# Patient Record
Sex: FEMALE | Race: WHITE | NOT HISPANIC OR LATINO | Employment: OTHER | URBAN - METROPOLITAN AREA
[De-identification: names, ages, dates, MRNs, and addresses within clinical notes are randomized per-mention and may not be internally consistent; named-entity substitution may affect disease eponyms.]

---

## 2017-01-04 ENCOUNTER — TELEPHONE (OUTPATIENT)
Dept: OBSTETRICS AND GYNECOLOGY | Facility: CLINIC | Age: 72
End: 2017-01-04

## 2017-01-04 DIAGNOSIS — B37.31 CANDIDIASIS OF VULVA: Primary | ICD-10-CM

## 2017-01-04 RX ORDER — FLUCONAZOLE 150 MG/1
150 TABLET ORAL AS NEEDED
Qty: 5 TABLET | Refills: 0 | Status: SHIPPED | OUTPATIENT
Start: 2017-01-04 | End: 2017-02-16 | Stop reason: SDUPTHER

## 2017-01-04 NOTE — TELEPHONE ENCOUNTER
Multiple phone calls back and forth to & from pt. She states she continues to be extremely uncomfortable w/ vulvar irritation/burning, and occasional itching. She was given results of vulvar bx. We have sent in RX for fluconazole 150 mg PO QOD x 5 doses and pt voices understanding instructions of use.  She states she is very frustrated about the discomfort associated with her pelvic prolapse. We discussed her multiple health issues and Dr Wagoner's concern possible increased surgical risk. She states she has been in fairly good control with blood sugars & has appt with diabetes doctor in 2 weeks. She will see her nephrologist in March.  She will call us back after completing the 10 day treatment (5 doses) of fluconazole.

## 2017-02-16 DIAGNOSIS — B37.31 CANDIDIASIS OF VULVA: ICD-10-CM

## 2017-02-16 RX ORDER — FLUCONAZOLE 150 MG/1
150 TABLET ORAL DAILY
Qty: 5 TABLET | Refills: 0 | Status: SHIPPED | OUTPATIENT
Start: 2017-02-16 | End: 2017-02-26

## 2017-02-16 RX ORDER — FLUCONAZOLE 150 MG/1
150 TABLET ORAL AS NEEDED
Qty: 5 TABLET | Refills: 0 | Status: SHIPPED | OUTPATIENT
Start: 2017-02-16 | End: 2018-05-17

## 2017-02-16 NOTE — TELEPHONE ENCOUNTER
"Call from pt requesting fluconazole refill.  States it \"worked great\" when she used it in early January, even helped with the rash she was experiencing under breasts.  Explained to her that problems with yeast will continue with blood sugars running high.  She says she is working with her PCP and he has changed her diabetes meds.  States her \"last A1C was 7\" Sending in fluconazole 150 mg, one tab every other day x 10 days.  Have requested she call me next week so we can discuss other long-term options with her.    "

## 2017-03-06 ENCOUNTER — HOSPITAL ENCOUNTER (OUTPATIENT)
Dept: OTHER | Age: 72
Discharge: HOME | End: 2017-03-06
Attending: INTERNAL MEDICINE | Admitting: INTERNAL MEDICINE

## 2017-03-06 LAB
CHOLESTEROL, TOTAL: 190 MG/DL (ref 0–200)
HBA1C MFR BLD: 7 %
HDLC SERPL-MCNC: 55 MG/DL (ref 40–60)
LDL CHOLESTEROL CALCULATED: 104 MG/DL
PARATHYROID HORMONE INTACT: 60.7 PG/ML (ref 14–72)
TRIGL SERPL-MCNC: 157 MG/DL (ref 0–249)
TSH SERPL DL<=0.05 MIU/L-ACNC: 2.26 UIU/ML (ref 0.35–5.5)
URIC ACID, SERUM: 7.4 MG/DL (ref 2.5–7.1)
VITAMIN D 25-HYDROXY: 31.3 (ref 32–100)
VLDLC SERPL CALC-MCNC: 31 MG/DL

## 2017-07-08 ENCOUNTER — HOSPITAL ENCOUNTER (OUTPATIENT)
Dept: OTHER | Age: 72
Discharge: HOME | End: 2017-07-08

## 2017-07-08 LAB
HBA1C MFR BLD: 6.7 %
PARATHYROID HORMONE INTACT: 88.2 PG/ML (ref 14–72)
TSH SERPL DL<=0.05 MIU/L-ACNC: 3.49 UIU/ML (ref 0.35–5.5)
URIC ACID, SERUM: 7.6 MG/DL (ref 2.5–7.1)

## 2017-07-10 LAB — VITAMIN D 25-HYDROXY: 40.9 (ref 32–100)

## 2017-09-07 ENCOUNTER — APPOINTMENT (OUTPATIENT)
Dept: BONE DENSITY | Facility: HOSPITAL | Age: 72
End: 2017-09-07

## 2017-09-07 ENCOUNTER — TRANSCRIBE ORDERS (OUTPATIENT)
Dept: ADMINISTRATIVE | Facility: HOSPITAL | Age: 72
End: 2017-09-07

## 2017-09-07 ENCOUNTER — HOSPITAL ENCOUNTER (OUTPATIENT)
Dept: GENERAL RADIOLOGY | Facility: HOSPITAL | Age: 72
Discharge: HOME | End: 2017-09-07
Admitting: INTERNAL MEDICINE

## 2017-09-07 DIAGNOSIS — M79.641 BILATERAL HAND PAIN: ICD-10-CM

## 2017-09-07 DIAGNOSIS — M79.641 BILATERAL HAND PAIN: Primary | ICD-10-CM

## 2017-09-07 DIAGNOSIS — M79.642 BILATERAL HAND PAIN: Primary | ICD-10-CM

## 2017-09-07 DIAGNOSIS — M79.642 BILATERAL HAND PAIN: ICD-10-CM

## 2017-09-07 DIAGNOSIS — N95.9 POST MENOPAUSAL PROBLEMS: ICD-10-CM

## 2017-09-07 PROCEDURE — 73120 X-RAY EXAM OF HAND: CPT

## 2017-09-07 PROCEDURE — 77080 DXA BONE DENSITY AXIAL: CPT

## 2017-11-29 ENCOUNTER — HOSPITAL ENCOUNTER (OUTPATIENT)
Dept: OTHER | Age: 72
Discharge: HOME | End: 2017-11-29

## 2017-11-29 LAB
CHOLESTEROL, TOTAL: 166 MG/DL (ref 0–200)
HBA1C MFR BLD: 6.6 %
HDLC SERPL-MCNC: 53 MG/DL (ref 40–60)
LDL CHOLESTEROL CALCULATED: 90 MG/DL
PARATHYROID HORMONE INTACT: 71.4 PG/ML (ref 14–72)
TRIGL SERPL-MCNC: 115 MG/DL (ref 0–249)
TSH SERPL DL<=0.05 MIU/L-ACNC: 4.71 UIU/ML (ref 0.35–5.5)
URIC ACID, SERUM: 8 MG/DL (ref 2.5–7.1)
VITAMIN D 25-HYDROXY: 45 (ref 32–100)
VLDLC SERPL CALC-MCNC: 23 MG/DL

## 2018-03-15 ENCOUNTER — HOSPITAL ENCOUNTER (OUTPATIENT)
Dept: OTHER | Age: 73
Discharge: HOME | End: 2018-03-15

## 2018-03-15 LAB
ALBUMIN SERPL-MCNC: 4.6 G/DL (ref 3.4–4.8)
ANION GAP SERPL CALCULATED.3IONS-SCNC: 13 MMOL/L (ref 3–16)
BILIRUBIN URINE: NEGATIVE
BLOOD, URINE: NEGATIVE
BUN BLDV-MCNC: 36 MG/DL (ref 6–20)
CALCIUM SERPL-MCNC: 9.3 MG/DL (ref 8.5–10.5)
CHLORIDE BLD-SCNC: 105 MMOL/L (ref 98–107)
CHOLESTEROL, TOTAL: 200 MG/DL (ref 0–200)
CLARITY: CLEAR
CO2: 23 MMOL/L (ref 20–30)
COLOR: YELLOW
CREAT SERPL-MCNC: 1.7 MG/DL (ref 0.4–1.2)
GFR AFRICAN AMERICAN: 36
GFR NON-AFRICAN AMERICAN: 29
GLUCOSE BLD-MCNC: 142 MG/DL (ref 74–106)
GLUCOSE URINE: NEGATIVE MG/DL
HBA1C MFR BLD: 7.4 %
HDLC SERPL-MCNC: 59 MG/DL (ref 40–60)
KETONES, URINE: NEGATIVE MG/DL
LDL CHOLESTEROL CALCULATED: 117 MG/DL
LEUKOCYTE ESTERASE, URINE: NEGATIVE
MICROSCOPIC EXAMINATION: NORMAL
NITRITE, URINE: NEGATIVE
PARATHYROID HORMONE INTACT: 63.2 PG/ML (ref 14–72)
PH UA: 5
PHOSPHORUS: 4 MG/DL (ref 2.5–4.5)
POTASSIUM SERPL-SCNC: 5 MMOL/L (ref 3.4–5.1)
PROTEIN UA: NEGATIVE MG/DL
SODIUM BLD-SCNC: 141 MMOL/L (ref 136–145)
SPECIFIC GRAVITY UA: <=1.005
TRIGL SERPL-MCNC: 122 MG/DL (ref 0–249)
TSH SERPL DL<=0.05 MIU/L-ACNC: 3.07 UIU/ML (ref 0.35–5.5)
URIC ACID, SERUM: 8.8 MG/DL (ref 2.5–7.1)
URINE TYPE: NORMAL
UROBILINOGEN, URINE: 0.2 E.U./DL
VITAMIN D 25-HYDROXY: 38.1 (ref 32–100)
VLDLC SERPL CALC-MCNC: 24 MG/DL

## 2018-04-12 ENCOUNTER — TRANSCRIBE ORDERS (OUTPATIENT)
Dept: ADMINISTRATIVE | Facility: HOSPITAL | Age: 73
End: 2018-04-12

## 2018-04-12 ENCOUNTER — HOSPITAL ENCOUNTER (OUTPATIENT)
Dept: GENERAL RADIOLOGY | Facility: HOSPITAL | Age: 73
Discharge: HOME | End: 2018-04-12
Admitting: FAMILY MEDICINE

## 2018-04-12 DIAGNOSIS — M25.541 ARTHRALGIA OF RIGHT HAND: Primary | ICD-10-CM

## 2018-04-12 PROCEDURE — 73130 X-RAY EXAM OF HAND: CPT

## 2018-05-17 ENCOUNTER — OFFICE VISIT (OUTPATIENT)
Dept: OBSTETRICS AND GYNECOLOGY | Facility: CLINIC | Age: 73
End: 2018-05-17

## 2018-05-17 VITALS
SYSTOLIC BLOOD PRESSURE: 130 MMHG | WEIGHT: 158.6 LBS | HEIGHT: 64 IN | BODY MASS INDEX: 27.08 KG/M2 | DIASTOLIC BLOOD PRESSURE: 64 MMHG

## 2018-05-17 DIAGNOSIS — N81.11 CYSTOCELE, MIDLINE: ICD-10-CM

## 2018-05-17 DIAGNOSIS — N81.4 UTERUS PROLAPSE: ICD-10-CM

## 2018-05-17 DIAGNOSIS — Z78.0 MENOPAUSE: Primary | ICD-10-CM

## 2018-05-17 PROCEDURE — 99213 OFFICE O/P EST LOW 20 MIN: CPT | Performed by: OBSTETRICS & GYNECOLOGY

## 2018-05-17 PROCEDURE — 57160 INSERT PESSARY/OTHER DEVICE: CPT | Performed by: OBSTETRICS & GYNECOLOGY

## 2018-05-17 PROCEDURE — A4562 PESSARY, NON RUBBER,ANY TYPE: HCPCS | Performed by: OBSTETRICS & GYNECOLOGY

## 2018-05-17 RX ORDER — ALLOPURINOL 100 MG/1
1 TABLET ORAL DAILY
COMMUNITY

## 2018-05-17 RX ORDER — GABAPENTIN 300 MG/1
300 CAPSULE ORAL 3 TIMES DAILY
COMMUNITY
End: 2020-10-12 | Stop reason: SDUPTHER

## 2018-05-17 RX ORDER — DILTIAZEM HYDROCHLORIDE 300 MG/1
1 CAPSULE, COATED, EXTENDED RELEASE ORAL DAILY
COMMUNITY
End: 2020-10-12 | Stop reason: SDUPTHER

## 2018-05-17 RX ORDER — PREDNISONE 10 MG/1
1 TABLET ORAL DAILY
Status: ON HOLD | COMMUNITY
Start: 2016-02-29 | End: 2018-07-02

## 2018-05-17 RX ORDER — HYDROXYCHLOROQUINE SULFATE 200 MG/1
200 TABLET, FILM COATED ORAL DAILY
COMMUNITY
End: 2020-10-12 | Stop reason: SDUPTHER

## 2018-05-17 NOTE — PROGRESS NOTES
"   Chief Complaint   Patient presents with   • Gynecologic Exam     c/o pelvic organ prolapse       Janette Diaz is a 72 y.o. year old  presenting to be seen because of complaints of uterine prolapse outside the vagina.  She felt a sudden bulge and \"surge\" 2 months ago and has had protrusion since that time.  She is having spotting from the cervix.  She is able to control her bowel movements and urination.  She has had no prior gynecologic surgery except for a D&C.    History   Sexual Activity   • Sexual activity: Not Currently   • Partners: Male     SCREENING TESTS    Year 2012   Age                         PAP                         HPV high risk                         Mammogram     benign                    JAMIE score                         Breast MRI                         Lipids                         Vitamin D                         Colonoscopy                         DEXA  Frax (hip/any)      osteopenia                   Ovarian Screen                             No Additional Complaints Reported    The following portions of the patient's history were reviewed and updated as appropriate:vital signs and   She  does not have any pertinent problems on file.  She  has a past surgical history that includes Cholecystectomy; Replacement total knee bilateral; Cataract extraction w/  intraocular lens implant (Bilateral); and Tubal ligation.  Her family history includes Breast cancer (age of onset: 43) in her daughter; Breast cancer (age of onset: 72) in her maternal grandmother; Diabetes in her maternal grandfather and mother; Heart disease in her father, maternal grandfather, and mother; Hypertension in her maternal grandfather; Osteoporosis in her maternal grandmother; Thyroid disease in her maternal grandmother.  She  reports that she has never smoked. She has never used smokeless tobacco. She " "reports that she drinks alcohol. She reports that she does not use drugs.  Current Outpatient Prescriptions   Medication Sig Dispense Refill   • hydroxychloroquine (PLAQUENIL) 200 MG tablet Take 200 mg by mouth Daily.     • predniSONE (DELTASONE) 10 MG tablet Take 1 tablet by mouth Daily.     • allopurinol (ZYLOPRIM) 100 MG tablet Take 1 tablet by mouth Daily.     • amitriptyline (ELAVIL) 50 MG tablet Take 50 mg by mouth nightly.     • aspirin 81 MG chewable tablet Take 81 mg by mouth daily     • diltiaZEM CD (CARTIA XT) 300 MG 24 hr capsule Take 1 capsule by mouth Daily.     • doxazosin (CARDURA) 8 MG tablet Take 1 tablet by mouth Daily.     • furosemide (LASIX) 20 MG tablet Take 20 mg by mouth daily     • gabapentin (NEURONTIN) 300 MG capsule Take 1 capsule by mouth 3 (Three) Times a Day.     • LANTUS SOLOSTAR 100 UNIT/ML injection pen INJECT 30 UNITS EVERY MORNING  0   • levothyroxine (SYNTHROID, LEVOTHROID) 75 MCG tablet Take 1 tablet by mouth Daily.     • nateglinide (STARLIX) 120 MG tablet Take 1 tablet by mouth 3 (three) times a day.     • omeprazole (PriLOSEC) 20 MG capsule Take 20 mg by mouth daily.     • pravastatin (PRAVACHOL) 20 MG tablet Take 20 mg by mouth daily.     • vitamin D (ERGOCALCIFEROL) 80253 UNITS capsule capsule Take 500,000 Units by mouth once a week Indications: take on Saturday       No current facility-administered medications for this visit.      She is allergic to codeine..        Review of Systems  A comprehensive review of systems was done.  Constitutional: negative for fever, chills, activity change, appetite change, fatigue and unexpected weight change.  Respiratory: negative  Cardiovascular: negative  Gastrointestinal: positive for constipation  Genitourinary:positive for bulging from the vagina  Musculoskeletal: negative  Behavioral/Psych: negative          /64   Ht 162.6 cm (64\")   Wt 71.9 kg (158 lb 9.6 oz)   LMP  (LMP Unknown) Comment: POST-MENOPAUSAL  BMI 27.22 " kg/m²     Physical Exam    General:  alert; cooperative; well developed; well nourished   Skin:  No suspicious lesions seen   Thyroid: normal to inspection and palpation   Lungs:  clear to auscultation bilaterally   Heart:  regular rate and rhythm, S1, S2 normal, no murmur, click, rub or gallop   Breasts:  No masses   Abdomen: soft, non-tender; no masses  no umbilical or inginual hernias are present  no hepato-splenomegaly   Pelvis: Clinical staff was present for exam  Cervix:  friable;  Uterus:  normal size, shape and consistency. prolapses  Adnexa:  non palpable bilaterally.  Cystocele GRADE 2  Uterine GRADE 4     Lab Review   No data reviewed    Imaging   Mammogram results    Medical counseling was given to patient for the following topics: diagnostic results, instructions for management, risk factor reductions, prognosis, impressions and risks and benefits of treatment options . Total time of the encounter was 23 minute(s) and 15 minute(s)  was spent in face to face counseling.  I have counseled the patient that she is going to need a sacral colpopexy to correct this major defect.  I have counseled the patient that I'm no longer doing this procedure but Dr. Best is.  I have counseled the patient that we need to attempt to elevate her uterine prolapse temporarily with a pessary in order to avoid its external position.  She is agreeable to this.  I have counseled her about the side effects of difficulty with voiding and defecation.  I have counseled her to use a stool softener daily.          ASSESSMENT  Problems Addressed this Visit        Genitourinary    Menopause - Primary    Uterus prolapse    Cystocele, midline            PLAN    · Medications prescribed this encounter:  No orders of the defined types were placed in this encounter.  · The patient was fit with a #5 cube pessary without difficulty.  She was able to tolerate the pessary without expulsion, Initially however with continued movement the pessary  was expelled.  · I have discussed follow up with the patient recommended that she see Dr. Best about a sacral colpopexy.  I have counseled her to avoid any lifting or straining in the meantime until he can see her.  · Follow up: 1 week(s) with Dr. Mihai Best  · Calcium, 600 mg/ Vit. D, 400 IU daily     *Please note that portions of this documentation may have been completed with a voice recognition program.  Efforts were made to edit this dictation, but occasional words may have been mistranscribed.     This note was electronically signed.    ROSEY Wagoner MD  May 17, 2018  3:30 PM

## 2018-05-21 ENCOUNTER — OFFICE VISIT (OUTPATIENT)
Dept: OBSTETRICS AND GYNECOLOGY | Facility: CLINIC | Age: 73
End: 2018-05-21

## 2018-05-21 VITALS
WEIGHT: 156 LBS | SYSTOLIC BLOOD PRESSURE: 118 MMHG | HEIGHT: 64 IN | DIASTOLIC BLOOD PRESSURE: 70 MMHG | BODY MASS INDEX: 26.63 KG/M2

## 2018-05-21 DIAGNOSIS — N81.6 RECTOCELE: ICD-10-CM

## 2018-05-21 DIAGNOSIS — N81.11 CYSTOCELE, MIDLINE: ICD-10-CM

## 2018-05-21 DIAGNOSIS — N81.4 UTERUS PROLAPSE: Primary | ICD-10-CM

## 2018-05-21 PROCEDURE — 99214 OFFICE O/P EST MOD 30 MIN: CPT | Performed by: OBSTETRICS & GYNECOLOGY

## 2018-05-22 ENCOUNTER — PREP FOR SURGERY (OUTPATIENT)
Dept: OTHER | Facility: HOSPITAL | Age: 73
End: 2018-05-22

## 2018-05-22 DIAGNOSIS — N81.4 UTERINE PROLAPSE: Primary | ICD-10-CM

## 2018-05-22 PROBLEM — Z80.3 FAMILY HISTORY OF BREAST CANCER: Status: ACTIVE | Noted: 2018-05-22

## 2018-05-22 PROBLEM — M85.80 OSTEOPENIA: Status: ACTIVE | Noted: 2018-05-22

## 2018-05-22 PROBLEM — Z96.653 HISTORY OF BILATERAL KNEE REPLACEMENT: Status: ACTIVE | Noted: 2018-05-22

## 2018-05-22 PROBLEM — H26.9 CATARACT: Status: ACTIVE | Noted: 2018-05-22

## 2018-05-22 PROBLEM — Z90.49 S/P CHOLECYSTECTOMY: Status: ACTIVE | Noted: 2018-05-22

## 2018-05-22 RX ORDER — CEFAZOLIN SODIUM 2 G/100ML
2 INJECTION, SOLUTION INTRAVENOUS
Status: CANCELLED | OUTPATIENT
Start: 2018-05-22 | End: 2018-05-24

## 2018-05-22 RX ORDER — SODIUM CHLORIDE 0.9 % (FLUSH) 0.9 %
1-10 SYRINGE (ML) INJECTION AS NEEDED
Status: CANCELLED | OUTPATIENT
Start: 2018-05-22

## 2018-05-22 RX ORDER — SODIUM CHLORIDE, SODIUM LACTATE, POTASSIUM CHLORIDE, CALCIUM CHLORIDE 600; 310; 30; 20 MG/100ML; MG/100ML; MG/100ML; MG/100ML
125 INJECTION, SOLUTION INTRAVENOUS CONTINUOUS
Status: CANCELLED | OUTPATIENT
Start: 2018-05-22

## 2018-05-22 NOTE — PROGRESS NOTES
Subjective   Chief Complaint   Patient presents with   • Consult     surg for complete prolapse     Janette Diaz is a 72 y.o. year old .  No LMP recorded (lmp unknown). Patient is postmenopausal.  She presents to be seen upon referral from Dr. EMILY Wagoner.  She has complete uterine prolapse by history and he feels she needs a sacral colpopexy. He attempted to place a pessary but this did not stay in the vagina.  She has been having extreme pain.  She reports that the uterus wasdropped down last year but then 2 weeks ago it completely fell out to the point where she has difficulty walking or sitting she has some bleeding and pain almost daily.  She is still sexually active or had been until the uterine prolapse.  There was some issue with dryness spite using over-the-counter lubricants.  I discussed various options and treatments.  Although she would be willing to undergo something where she would be sexually activeif that were the only option I think a sacral colpopexy but obviously preserved the vaginal depth to allow for sexual activity.  Options discussed included vaginal surgery however I don't think that would be a good long-term cure.  I discussed various options regarding sacral colpopexy or sacral cervico pexy which would preserve the cervix there be less risk for mesh erosion if the mesh was placed and cervix.  Also preserve a little more length to the vagina.  Discussed removal of at least her tubes if not tubes and ovaries at her age.  Suggested that she probably would need some estrogen cream at the very minimal.  She has not been on estrogen replacement therapy.  Her largest vaginal delivery was 9 pounds and 6 ounces in . Her next delivery was in .  Her daughter weighed 7 pounds.    3 years ago she has developed breast cancer.   BRCA1 and 2 negative.    Past 6 month menstrual and contraceptive history:    No LMP recorded (lmp unknown). Patient is postmenopausal.                        "       The following portions of the patient's history were reviewed and updated as appropriate.    Review of Systems      Objective   /70   Ht 162.6 cm (64\")   Wt 70.8 kg (156 lb)   LMP  (LMP Unknown) Comment: POST-MENOPAUSAL  BMI 26.78 kg/m²     General:  well developed; well nourished  no acute distress  appears stated age   Skin:  No suspicious lesions seen   Thyroid: not examined   Lungs:  breathing is unlabored   Heart:  Not performed.   Abdomen: soft, non-tender; no masses  no umbilical or inginual hernias are present  no hepato-splenomegaly   Pelvis: Clinical staff was present for exam  External genitalia:  normal appearance of the external genitalia including Bartholin's and [place]'s glands. Total uterine prolapse noted  :  urethral meatus normal;  Vaginal:  atrophic mucosal changes are present;  Cervix:  friable;  Uterus:  normal size, shape and consistency. Total uterine prolapse but does not feel enlarged cervix maybe along  Adnexa:  normal size, shape and consistency. Total prolapse but this does not feel enlarged other than maybe linked and cervix  Rectal:  anus visually normal appearing. recto-vaginal exam unremarkable and confirms findings; no masses; guaiac negative; Thin rectovaginal septum  Cystocele GRADE 3  Uterine GRADE 4     Lab Review   No data reviewed    Imaging   No data reviewed       Assessment   1.   Total uterine prolapse which is asymptomatic currently.  I discussed various methods of repair and I think a laparoscopic sacral cervical pexy with associated subtotal hysterectomy with the good option.  Could consider even a uteropexy.  Removal of tubes and/or ovaries will be considered.  Would perform supracervical/subtotal hysterectomy if cervical pexy  Could remove that through [employer] GelPort if as small as expected.    2.  she will also need cystocele repair.  Discussed estrogen or vaginally before and have given her sample of Premarin cream and a prescription for " that.  Discussed that she would need to use afterwards.  Discussed risks and benefits suggested she ignore the risks for breast or   Uterine cancer.  There is not enough estrogen using thatfor short-term or to 3 times weekly because breast cancer.  3.  Thin rectovaginal septumwould also need some repair at same time,  She does have constipation and will need to control that postoperatively.  4.  medical problems include renal failure who she sees Dr. Anders Jolly.  She also has diabetes hypertension and arthritis.     Plan   1.   As above we will need to schedule laparoscopic sacral cervical pexy with associated subtotal hysterectomy bilateral salpingectomies possible oophorectomies.  Anterior and posterior colporrhaphy.  2.   We'll need assistance for medical clearance with 's Jerod and Shanae.    Medications Rx this encounter:  New Medications Ordered This Visit   Medications   • conjugated estrogens (PREMARIN) 0.625 MG/GM vaginal cream     Sig: Use 0.5 - 2.0 grams intravaginally 1-3 times per week to control symptoms.     Dispense:  1 each     Refill:  12          This note was electronically signed.    Mihai Best MD  May 22, 2018

## 2018-05-30 ENCOUNTER — TELEPHONE (OUTPATIENT)
Dept: OBSTETRICS AND GYNECOLOGY | Facility: CLINIC | Age: 73
End: 2018-05-30

## 2018-05-30 NOTE — TELEPHONE ENCOUNTER
Provider Name Nasir    Reason for Call In pain and wants to move surgery sooner than July 2, plus what can she    take for the pain     Pharmacy Name [facility] in [place]    Call Back Number [phone]

## 2018-05-31 NOTE — TELEPHONE ENCOUNTER
I think that's why I reduce her to just 400 mg of Advil but she could probably take 200 mg of Advil 2-3 times a day for a short period.  The standard dose will be up to 2400 mg a day and she may only take 4-600 and think her kidneys can tolerate that.  Can't really call in narcotics.  We could see if she would benefit from some lidocaine ointment to put the uterus back up and place?

## 2018-05-31 NOTE — TELEPHONE ENCOUNTER
Called pt to inform of alternating Tylenol/Motrin and she states that at the recommendation of her Nephrologist she is not to use Advil, Aleve or Motrin due to her kidneys not working at full capacity.  What else do you recommend?

## 2018-05-31 NOTE — TELEPHONE ENCOUNTER
She can alternate Tylenol and Motrin maybe 400 mg every 4 hours.  I discussed that I wanted her to wait until July 2 because she needs to use the estrogen so that she has better long-term results.

## 2018-06-26 ENCOUNTER — APPOINTMENT (OUTPATIENT)
Dept: PREADMISSION TESTING | Facility: HOSPITAL | Age: 73
End: 2018-06-26

## 2018-06-26 ENCOUNTER — OFFICE VISIT (OUTPATIENT)
Dept: OBSTETRICS AND GYNECOLOGY | Facility: CLINIC | Age: 73
End: 2018-06-26

## 2018-06-26 VITALS — HEIGHT: 64 IN | BODY MASS INDEX: 27.81 KG/M2 | WEIGHT: 162.92 LBS

## 2018-06-26 VITALS
WEIGHT: 164 LBS | BODY MASS INDEX: 28.15 KG/M2 | RESPIRATION RATE: 16 BRPM | SYSTOLIC BLOOD PRESSURE: 122 MMHG | DIASTOLIC BLOOD PRESSURE: 70 MMHG

## 2018-06-26 DIAGNOSIS — Z01.818 PRE-OP EXAM: ICD-10-CM

## 2018-06-26 DIAGNOSIS — N81.6 RECTOCELE: ICD-10-CM

## 2018-06-26 DIAGNOSIS — N81.4 UTERINE PROLAPSE: ICD-10-CM

## 2018-06-26 DIAGNOSIS — N18.1 STAGE 1 CHRONIC KIDNEY DISEASE: ICD-10-CM

## 2018-06-26 DIAGNOSIS — R11.2 POST-OPERATIVE NAUSEA AND VOMITING: Primary | ICD-10-CM

## 2018-06-26 DIAGNOSIS — Z98.890 POST-OPERATIVE NAUSEA AND VOMITING: Primary | ICD-10-CM

## 2018-06-26 DIAGNOSIS — I10 HYPERTENSION, UNSPECIFIED TYPE: ICD-10-CM

## 2018-06-26 DIAGNOSIS — N81.11 CYSTOCELE, MIDLINE: ICD-10-CM

## 2018-06-26 LAB
ANION GAP SERPL CALCULATED.3IONS-SCNC: 7 MMOL/L (ref 3–11)
BUN BLD-MCNC: 26 MG/DL (ref 9–23)
BUN/CREAT SERPL: 15 (ref 7–25)
CALCIUM SPEC-SCNC: 9.1 MG/DL (ref 8.7–10.4)
CHLORIDE SERPL-SCNC: 108 MMOL/L (ref 99–109)
CO2 SERPL-SCNC: 26 MMOL/L (ref 20–31)
CREAT BLD-MCNC: 1.73 MG/DL (ref 0.6–1.3)
DEPRECATED RDW RBC AUTO: 47.9 FL (ref 37–54)
ERYTHROCYTE [DISTWIDTH] IN BLOOD BY AUTOMATED COUNT: 13.9 % (ref 11.3–14.5)
GFR SERPL CREATININE-BSD FRML MDRD: 29 ML/MIN/1.73
GLUCOSE BLD-MCNC: 108 MG/DL (ref 70–100)
HCT VFR BLD AUTO: 30.4 % (ref 34.5–44)
HGB BLD-MCNC: 9.9 G/DL (ref 11.5–15.5)
MCH RBC QN AUTO: 30.7 PG (ref 27–31)
MCHC RBC AUTO-ENTMCNC: 32.6 G/DL (ref 32–36)
MCV RBC AUTO: 94.4 FL (ref 80–99)
PLATELET # BLD AUTO: 170 10*3/MM3 (ref 150–450)
PMV BLD AUTO: 9.8 FL (ref 6–12)
POTASSIUM BLD-SCNC: 4.8 MMOL/L (ref 3.5–5.5)
RBC # BLD AUTO: 3.22 10*6/MM3 (ref 3.89–5.14)
SODIUM BLD-SCNC: 141 MMOL/L (ref 132–146)
WBC NRBC COR # BLD: 8.27 10*3/MM3 (ref 3.5–10.8)

## 2018-06-26 PROCEDURE — 93010 ELECTROCARDIOGRAM REPORT: CPT | Performed by: INTERNAL MEDICINE

## 2018-06-26 PROCEDURE — 36415 COLL VENOUS BLD VENIPUNCTURE: CPT | Performed by: OBSTETRICS & GYNECOLOGY

## 2018-06-26 PROCEDURE — 93005 ELECTROCARDIOGRAM TRACING: CPT

## 2018-06-26 PROCEDURE — 80048 BASIC METABOLIC PNL TOTAL CA: CPT | Performed by: OBSTETRICS & GYNECOLOGY

## 2018-06-26 PROCEDURE — 85027 COMPLETE CBC AUTOMATED: CPT | Performed by: OBSTETRICS & GYNECOLOGY

## 2018-06-26 PROCEDURE — S0260 H&P FOR SURGERY: HCPCS | Performed by: OBSTETRICS & GYNECOLOGY

## 2018-06-26 RX ORDER — AMITRIPTYLINE HYDROCHLORIDE 50 MG/1
50 TABLET, FILM COATED ORAL NIGHTLY
Status: ON HOLD | COMMUNITY
End: 2018-07-02

## 2018-06-26 RX ORDER — OMEPRAZOLE 20 MG/1
20 CAPSULE, DELAYED RELEASE ORAL DAILY
Status: ON HOLD | COMMUNITY
End: 2018-07-02

## 2018-06-26 RX ORDER — ASPIRIN 81 MG/1
81 TABLET ORAL DAILY
Status: ON HOLD | COMMUNITY
End: 2018-07-02

## 2018-06-26 RX ORDER — FUROSEMIDE 20 MG/1
20 TABLET ORAL DAILY
Status: ON HOLD | COMMUNITY
End: 2018-07-02

## 2018-06-26 RX ORDER — PRAVASTATIN SODIUM 20 MG
20 TABLET ORAL DAILY
Status: ON HOLD | COMMUNITY
End: 2018-07-02

## 2018-06-26 NOTE — DISCHARGE INSTRUCTIONS

## 2018-06-30 PROBLEM — N81.4 UTERINE PROLAPSE: Status: RESOLVED | Noted: 2018-05-22 | Resolved: 2018-06-30

## 2018-07-02 ENCOUNTER — ANESTHESIA (OUTPATIENT)
Dept: PERIOP | Facility: HOSPITAL | Age: 73
End: 2018-07-02

## 2018-07-02 ENCOUNTER — HOSPITAL ENCOUNTER (OUTPATIENT)
Facility: HOSPITAL | Age: 73
Discharge: HOME | End: 2018-07-03
Attending: OBSTETRICS & GYNECOLOGY | Admitting: OBSTETRICS & GYNECOLOGY

## 2018-07-02 ENCOUNTER — ANESTHESIA EVENT (OUTPATIENT)
Dept: PERIOP | Facility: HOSPITAL | Age: 73
End: 2018-07-02

## 2018-07-02 DIAGNOSIS — N81.4 UTERINE PROLAPSE: ICD-10-CM

## 2018-07-02 LAB
GLUCOSE BLDC GLUCOMTR-MCNC: 137 MG/DL (ref 70–130)
GLUCOSE BLDC GLUCOMTR-MCNC: 236 MG/DL (ref 70–130)
GLUCOSE BLDC GLUCOMTR-MCNC: 274 MG/DL (ref 70–130)
POTASSIUM BLDA-SCNC: 4.39 MMOL/L (ref 3.5–5.3)

## 2018-07-02 PROCEDURE — A9270 NON-COVERED ITEM OR SERVICE: HCPCS | Performed by: OBSTETRICS & GYNECOLOGY

## 2018-07-02 PROCEDURE — 82962 GLUCOSE BLOOD TEST: CPT

## 2018-07-02 PROCEDURE — 25010000002 NEOSTIGMINE 10 MG/10ML SOLUTION: Performed by: ANESTHESIOLOGY

## 2018-07-02 PROCEDURE — 63710000001 TERAZOSIN 5 MG CAPSULE: Performed by: OBSTETRICS & GYNECOLOGY

## 2018-07-02 PROCEDURE — 63710000001 INSULIN LISPRO (HUMAN) PER 5 UNITS

## 2018-07-02 PROCEDURE — 25010000002 ONDANSETRON PER 1 MG: Performed by: OBSTETRICS & GYNECOLOGY

## 2018-07-02 PROCEDURE — 25010000002 FENTANYL CITRATE (PF) 100 MCG/2ML SOLUTION: Performed by: NURSE ANESTHETIST, CERTIFIED REGISTERED

## 2018-07-02 PROCEDURE — 88307 TISSUE EXAM BY PATHOLOGIST: CPT | Performed by: OBSTETRICS & GYNECOLOGY

## 2018-07-02 PROCEDURE — 25010000002 FUROSEMIDE PER 20 MG: Performed by: OBSTETRICS & GYNECOLOGY

## 2018-07-02 PROCEDURE — 25010000002 ONDANSETRON PER 1 MG: Performed by: ANESTHESIOLOGY

## 2018-07-02 PROCEDURE — 63710000001 FAMOTIDINE 20 MG TABLET: Performed by: ANESTHESIOLOGY

## 2018-07-02 PROCEDURE — 63710000001 AMITRIPTYLINE 50 MG TABLET: Performed by: OBSTETRICS & GYNECOLOGY

## 2018-07-02 PROCEDURE — A9270 NON-COVERED ITEM OR SERVICE: HCPCS | Performed by: ANESTHESIOLOGY

## 2018-07-02 PROCEDURE — 25010000002 PROPOFOL 10 MG/ML EMULSION: Performed by: NURSE ANESTHETIST, CERTIFIED REGISTERED

## 2018-07-02 PROCEDURE — 63710000001 GABAPENTIN 300 MG CAPSULE: Performed by: OBSTETRICS & GYNECOLOGY

## 2018-07-02 PROCEDURE — 84132 ASSAY OF SERUM POTASSIUM: CPT | Performed by: ANESTHESIOLOGY

## 2018-07-02 PROCEDURE — 58542 LSH W/T/O UT 250 G OR LESS: CPT | Performed by: OBSTETRICS & GYNECOLOGY

## 2018-07-02 PROCEDURE — 63710000001 HYDROXYCHLOROQUINE 200 MG TABLET: Performed by: OBSTETRICS & GYNECOLOGY

## 2018-07-02 PROCEDURE — 25010000002 KETOROLAC TROMETHAMINE PER 15 MG: Performed by: OBSTETRICS & GYNECOLOGY

## 2018-07-02 PROCEDURE — C1781 MESH (IMPLANTABLE): HCPCS | Performed by: OBSTETRICS & GYNECOLOGY

## 2018-07-02 PROCEDURE — A9270 NON-COVERED ITEM OR SERVICE: HCPCS

## 2018-07-02 PROCEDURE — 25010000003 CEFAZOLIN IN DEXTROSE 2-4 GM/100ML-% SOLUTION: Performed by: OBSTETRICS & GYNECOLOGY

## 2018-07-02 PROCEDURE — 63710000001 ALLOPURINOL 100 MG TABLET: Performed by: OBSTETRICS & GYNECOLOGY

## 2018-07-02 PROCEDURE — 25010000002 PHENYLEPHRINE PER 1 ML: Performed by: OBSTETRICS & GYNECOLOGY

## 2018-07-02 PROCEDURE — 25010000002 DEXAMETHASONE PER 1 MG: Performed by: NURSE ANESTHETIST, CERTIFIED REGISTERED

## 2018-07-02 PROCEDURE — 57260 CMBN ANT PST COLPRHY: CPT | Performed by: OBSTETRICS & GYNECOLOGY

## 2018-07-02 PROCEDURE — 57425 LAPAROSCOPY SURG COLPOPEXY: CPT | Performed by: OBSTETRICS & GYNECOLOGY

## 2018-07-02 DEVICE — MESH ARTISYN Y SHP: Type: IMPLANTABLE DEVICE | Site: ABDOMEN | Status: FUNCTIONAL

## 2018-07-02 RX ORDER — DILTIAZEM HYDROCHLORIDE 300 MG/1
300 CAPSULE, COATED, EXTENDED RELEASE ORAL DAILY
Status: DISCONTINUED | OUTPATIENT
Start: 2018-07-03 | End: 2018-07-03 | Stop reason: HOSPADM

## 2018-07-02 RX ORDER — PROMETHAZINE HYDROCHLORIDE 25 MG/ML
12.5 INJECTION, SOLUTION INTRAMUSCULAR; INTRAVENOUS EVERY 6 HOURS PRN
Status: DISCONTINUED | OUTPATIENT
Start: 2018-07-02 | End: 2018-07-03 | Stop reason: HOSPADM

## 2018-07-02 RX ORDER — IBUPROFEN 600 MG/1
600 TABLET ORAL EVERY 6 HOURS PRN
Status: DISCONTINUED | OUTPATIENT
Start: 2018-07-02 | End: 2018-07-03 | Stop reason: HOSPADM

## 2018-07-02 RX ORDER — INSULIN GLARGINE 100 [IU]/ML
30 INJECTION, SOLUTION SUBCUTANEOUS EVERY MORNING
Status: DISCONTINUED | OUTPATIENT
Start: 2018-07-03 | End: 2018-07-03 | Stop reason: HOSPADM

## 2018-07-02 RX ORDER — KETOROLAC TROMETHAMINE 30 MG/ML
15 INJECTION, SOLUTION INTRAMUSCULAR; INTRAVENOUS EVERY 6 HOURS PRN
Status: DISCONTINUED | OUTPATIENT
Start: 2018-07-02 | End: 2018-07-03 | Stop reason: HOSPADM

## 2018-07-02 RX ORDER — GABAPENTIN 300 MG/1
300 CAPSULE ORAL EVERY 8 HOURS SCHEDULED
Status: DISCONTINUED | OUTPATIENT
Start: 2018-07-02 | End: 2018-07-03 | Stop reason: HOSPADM

## 2018-07-02 RX ORDER — TERAZOSIN 5 MG/1
10 CAPSULE ORAL NIGHTLY
Status: DISCONTINUED | OUTPATIENT
Start: 2018-07-02 | End: 2018-07-03 | Stop reason: HOSPADM

## 2018-07-02 RX ORDER — CEFAZOLIN SODIUM 2 G/100ML
2 INJECTION, SOLUTION INTRAVENOUS
Status: DISCONTINUED | OUTPATIENT
Start: 2018-07-03 | End: 2018-07-02 | Stop reason: SDUPTHER

## 2018-07-02 RX ORDER — METOCLOPRAMIDE HYDROCHLORIDE 5 MG/ML
5 INJECTION INTRAMUSCULAR; INTRAVENOUS EVERY 6 HOURS PRN
Status: DISCONTINUED | OUTPATIENT
Start: 2018-07-02 | End: 2018-07-03 | Stop reason: HOSPADM

## 2018-07-02 RX ORDER — CEFAZOLIN SODIUM 2 G/100ML
2 INJECTION, SOLUTION INTRAVENOUS
Status: DISCONTINUED | OUTPATIENT
Start: 2018-07-02 | End: 2018-07-02 | Stop reason: HOSPADM

## 2018-07-02 RX ORDER — ONDANSETRON 4 MG/1
4 TABLET, FILM COATED ORAL EVERY 6 HOURS PRN
Status: DISCONTINUED | OUTPATIENT
Start: 2018-07-02 | End: 2018-07-03 | Stop reason: HOSPADM

## 2018-07-02 RX ORDER — ONDANSETRON 2 MG/ML
4 INJECTION INTRAMUSCULAR; INTRAVENOUS ONCE AS NEEDED
Status: DISCONTINUED | OUTPATIENT
Start: 2018-07-02 | End: 2018-07-02 | Stop reason: HOSPADM

## 2018-07-02 RX ORDER — PROPOFOL 10 MG/ML
VIAL (ML) INTRAVENOUS AS NEEDED
Status: DISCONTINUED | OUTPATIENT
Start: 2018-07-02 | End: 2018-07-02 | Stop reason: SURG

## 2018-07-02 RX ORDER — SIMETHICONE 80 MG
80 TABLET,CHEWABLE ORAL 4 TIMES DAILY PRN
Status: DISCONTINUED | OUTPATIENT
Start: 2018-07-02 | End: 2018-07-03 | Stop reason: HOSPADM

## 2018-07-02 RX ORDER — NEOSTIGMINE METHYLSULFATE 1 MG/ML
INJECTION, SOLUTION INTRAVENOUS AS NEEDED
Status: DISCONTINUED | OUTPATIENT
Start: 2018-07-02 | End: 2018-07-02 | Stop reason: SURG

## 2018-07-02 RX ORDER — ONDANSETRON 2 MG/ML
INJECTION INTRAMUSCULAR; INTRAVENOUS AS NEEDED
Status: DISCONTINUED | OUTPATIENT
Start: 2018-07-02 | End: 2018-07-02 | Stop reason: SURG

## 2018-07-02 RX ORDER — SODIUM CHLORIDE, SODIUM LACTATE, POTASSIUM CHLORIDE, CALCIUM CHLORIDE 600; 310; 30; 20 MG/100ML; MG/100ML; MG/100ML; MG/100ML
9 INJECTION, SOLUTION INTRAVENOUS CONTINUOUS
Status: DISCONTINUED | OUTPATIENT
Start: 2018-07-02 | End: 2018-07-02

## 2018-07-02 RX ORDER — FENTANYL CITRATE 50 UG/ML
50 INJECTION, SOLUTION INTRAMUSCULAR; INTRAVENOUS
Status: DISCONTINUED | OUTPATIENT
Start: 2018-07-02 | End: 2018-07-02 | Stop reason: HOSPADM

## 2018-07-02 RX ORDER — LEVOTHYROXINE SODIUM 0.07 MG/1
75 TABLET ORAL
Status: DISCONTINUED | OUTPATIENT
Start: 2018-07-03 | End: 2018-07-03 | Stop reason: HOSPADM

## 2018-07-02 RX ORDER — DEXTROSE MONOHYDRATE 25 G/50ML
25 INJECTION, SOLUTION INTRAVENOUS
Status: DISCONTINUED | OUTPATIENT
Start: 2018-07-02 | End: 2018-07-03 | Stop reason: HOSPADM

## 2018-07-02 RX ORDER — ONDANSETRON 2 MG/ML
4 INJECTION INTRAMUSCULAR; INTRAVENOUS EVERY 6 HOURS PRN
Status: DISCONTINUED | OUTPATIENT
Start: 2018-07-02 | End: 2018-07-03 | Stop reason: HOSPADM

## 2018-07-02 RX ORDER — ZOLPIDEM TARTRATE 5 MG/1
5 TABLET ORAL NIGHTLY PRN
Status: DISCONTINUED | OUTPATIENT
Start: 2018-07-02 | End: 2018-07-03 | Stop reason: HOSPADM

## 2018-07-02 RX ORDER — BISACODYL 5 MG/1
10 TABLET, DELAYED RELEASE ORAL DAILY PRN
Status: DISCONTINUED | OUTPATIENT
Start: 2018-07-02 | End: 2018-07-03 | Stop reason: HOSPADM

## 2018-07-02 RX ORDER — CEFAZOLIN SODIUM 2 G/100ML
2 INJECTION, SOLUTION INTRAVENOUS EVERY 8 HOURS
Status: COMPLETED | OUTPATIENT
Start: 2018-07-03 | End: 2018-07-03

## 2018-07-02 RX ORDER — SODIUM CHLORIDE 0.9 % (FLUSH) 0.9 %
1-10 SYRINGE (ML) INJECTION AS NEEDED
Status: DISCONTINUED | OUTPATIENT
Start: 2018-07-02 | End: 2018-07-02 | Stop reason: HOSPADM

## 2018-07-02 RX ORDER — FENTANYL CITRATE 50 UG/ML
INJECTION, SOLUTION INTRAMUSCULAR; INTRAVENOUS AS NEEDED
Status: DISCONTINUED | OUTPATIENT
Start: 2018-07-02 | End: 2018-07-02 | Stop reason: SURG

## 2018-07-02 RX ORDER — LIDOCAINE HYDROCHLORIDE 10 MG/ML
0.5 INJECTION, SOLUTION EPIDURAL; INFILTRATION; INTRACAUDAL; PERINEURAL ONCE AS NEEDED
Status: COMPLETED | OUTPATIENT
Start: 2018-07-02 | End: 2018-07-02

## 2018-07-02 RX ORDER — GLYCOPYRROLATE 0.2 MG/ML
INJECTION INTRAMUSCULAR; INTRAVENOUS AS NEEDED
Status: DISCONTINUED | OUTPATIENT
Start: 2018-07-02 | End: 2018-07-02 | Stop reason: SURG

## 2018-07-02 RX ORDER — SODIUM CHLORIDE 9 MG/ML
INJECTION, SOLUTION INTRAVENOUS CONTINUOUS PRN
Status: DISCONTINUED | OUTPATIENT
Start: 2018-07-02 | End: 2018-07-02

## 2018-07-02 RX ORDER — DOCUSATE SODIUM 100 MG/1
100 CAPSULE, LIQUID FILLED ORAL 2 TIMES DAILY PRN
Status: DISCONTINUED | OUTPATIENT
Start: 2018-07-02 | End: 2018-07-03 | Stop reason: HOSPADM

## 2018-07-02 RX ORDER — ATRACURIUM BESYLATE 10 MG/ML
INJECTION, SOLUTION INTRAVENOUS AS NEEDED
Status: DISCONTINUED | OUTPATIENT
Start: 2018-07-02 | End: 2018-07-02 | Stop reason: SURG

## 2018-07-02 RX ORDER — HYDROXYCHLOROQUINE SULFATE 200 MG/1
200 TABLET, FILM COATED ORAL DAILY
Status: DISCONTINUED | OUTPATIENT
Start: 2018-07-02 | End: 2018-07-03 | Stop reason: HOSPADM

## 2018-07-02 RX ORDER — FUROSEMIDE 10 MG/ML
40 INJECTION INTRAMUSCULAR; INTRAVENOUS ONCE
Status: COMPLETED | OUTPATIENT
Start: 2018-07-02 | End: 2018-07-02

## 2018-07-02 RX ORDER — DEXAMETHASONE SODIUM PHOSPHATE 4 MG/ML
INJECTION, SOLUTION INTRA-ARTICULAR; INTRALESIONAL; INTRAMUSCULAR; INTRAVENOUS; SOFT TISSUE AS NEEDED
Status: DISCONTINUED | OUTPATIENT
Start: 2018-07-02 | End: 2018-07-02 | Stop reason: SURG

## 2018-07-02 RX ORDER — PANTOPRAZOLE SODIUM 40 MG/1
40 TABLET, DELAYED RELEASE ORAL EVERY MORNING
Status: DISCONTINUED | OUTPATIENT
Start: 2018-07-03 | End: 2018-07-03 | Stop reason: HOSPADM

## 2018-07-02 RX ORDER — AMITRIPTYLINE HYDROCHLORIDE 50 MG/1
50 TABLET, FILM COATED ORAL NIGHTLY
Status: DISCONTINUED | OUTPATIENT
Start: 2018-07-02 | End: 2018-07-03 | Stop reason: HOSPADM

## 2018-07-02 RX ORDER — PROMETHAZINE HYDROCHLORIDE 12.5 MG/1
12.5 TABLET ORAL EVERY 6 HOURS PRN
Status: DISCONTINUED | OUTPATIENT
Start: 2018-07-02 | End: 2018-07-03 | Stop reason: HOSPADM

## 2018-07-02 RX ORDER — SODIUM CHLORIDE, SODIUM LACTATE, POTASSIUM CHLORIDE, CALCIUM CHLORIDE 600; 310; 30; 20 MG/100ML; MG/100ML; MG/100ML; MG/100ML
125 INJECTION, SOLUTION INTRAVENOUS CONTINUOUS
Status: DISCONTINUED | OUTPATIENT
Start: 2018-07-02 | End: 2018-07-02

## 2018-07-02 RX ORDER — HYDROCODONE BITARTRATE AND ACETAMINOPHEN 10; 325 MG/1; MG/1
1 TABLET ORAL EVERY 6 HOURS PRN
Status: DISCONTINUED | OUTPATIENT
Start: 2018-07-02 | End: 2018-07-03 | Stop reason: HOSPADM

## 2018-07-02 RX ORDER — PROMETHAZINE HYDROCHLORIDE 12.5 MG/1
12.5 SUPPOSITORY RECTAL EVERY 6 HOURS PRN
Status: DISCONTINUED | OUTPATIENT
Start: 2018-07-02 | End: 2018-07-03 | Stop reason: HOSPADM

## 2018-07-02 RX ORDER — SODIUM CHLORIDE 9 MG/ML
125 INJECTION, SOLUTION INTRAVENOUS CONTINUOUS
Status: DISCONTINUED | OUTPATIENT
Start: 2018-07-02 | End: 2018-07-03 | Stop reason: HOSPADM

## 2018-07-02 RX ORDER — ALLOPURINOL 100 MG/1
100 TABLET ORAL DAILY
Status: DISCONTINUED | OUTPATIENT
Start: 2018-07-02 | End: 2018-07-03 | Stop reason: HOSPADM

## 2018-07-02 RX ORDER — BUPIVACAINE HYDROCHLORIDE AND EPINEPHRINE 5; 5 MG/ML; UG/ML
INJECTION, SOLUTION PERINEURAL AS NEEDED
Status: DISCONTINUED | OUTPATIENT
Start: 2018-07-02 | End: 2018-07-02 | Stop reason: HOSPADM

## 2018-07-02 RX ORDER — LIDOCAINE HYDROCHLORIDE 10 MG/ML
INJECTION, SOLUTION EPIDURAL; INFILTRATION; INTRACAUDAL; PERINEURAL AS NEEDED
Status: DISCONTINUED | OUTPATIENT
Start: 2018-07-02 | End: 2018-07-02 | Stop reason: SURG

## 2018-07-02 RX ORDER — LABETALOL HYDROCHLORIDE 5 MG/ML
5 INJECTION, SOLUTION INTRAVENOUS
Status: DISCONTINUED | OUTPATIENT
Start: 2018-07-02 | End: 2018-07-02 | Stop reason: HOSPADM

## 2018-07-02 RX ORDER — NATEGLINIDE 60 MG/1
120 TABLET ORAL 3 TIMES DAILY
Status: DISCONTINUED | OUTPATIENT
Start: 2018-07-02 | End: 2018-07-03 | Stop reason: HOSPADM

## 2018-07-02 RX ORDER — DEXTROSE AND SODIUM CHLORIDE 5; .45 G/100ML; G/100ML
100 INJECTION, SOLUTION INTRAVENOUS CONTINUOUS
Status: DISCONTINUED | OUTPATIENT
Start: 2018-07-02 | End: 2018-07-02

## 2018-07-02 RX ORDER — HYDROCODONE BITARTRATE AND ACETAMINOPHEN 5; 325 MG/1; MG/1
1 TABLET ORAL EVERY 4 HOURS PRN
Status: DISCONTINUED | OUTPATIENT
Start: 2018-07-02 | End: 2018-07-03 | Stop reason: HOSPADM

## 2018-07-02 RX ORDER — NICOTINE POLACRILEX 4 MG
15 LOZENGE BUCCAL
Status: DISCONTINUED | OUTPATIENT
Start: 2018-07-02 | End: 2018-07-03 | Stop reason: HOSPADM

## 2018-07-02 RX ORDER — FAMOTIDINE 20 MG/1
20 TABLET, FILM COATED ORAL ONCE
Status: COMPLETED | OUTPATIENT
Start: 2018-07-02 | End: 2018-07-02

## 2018-07-02 RX ADMIN — FAMOTIDINE 20 MG: 20 TABLET ORAL at 12:58

## 2018-07-02 RX ADMIN — SODIUM CHLORIDE, POTASSIUM CHLORIDE, SODIUM LACTATE AND CALCIUM CHLORIDE 125 ML/HR: 600; 310; 30; 20 INJECTION, SOLUTION INTRAVENOUS at 11:24

## 2018-07-02 RX ADMIN — CEFAZOLIN SODIUM 2 G: 2 INJECTION, SOLUTION INTRAVENOUS at 13:20

## 2018-07-02 RX ADMIN — FENTANYL CITRATE 50 MCG: 50 INJECTION, SOLUTION INTRAMUSCULAR; INTRAVENOUS at 16:05

## 2018-07-02 RX ADMIN — INSULIN LISPRO 4 UNITS: 100 INJECTION, SOLUTION INTRAVENOUS; SUBCUTANEOUS at 22:26

## 2018-07-02 RX ADMIN — LIDOCAINE HYDROCHLORIDE 60 MG: 10 INJECTION, SOLUTION EPIDURAL; INFILTRATION; INTRACAUDAL; PERINEURAL at 13:09

## 2018-07-02 RX ADMIN — SODIUM CHLORIDE 500 ML: 9 INJECTION, SOLUTION INTRAVENOUS at 23:40

## 2018-07-02 RX ADMIN — ALLOPURINOL 100 MG: 100 TABLET ORAL at 22:24

## 2018-07-02 RX ADMIN — ONDANSETRON 4 MG: 2 INJECTION INTRAMUSCULAR; INTRAVENOUS at 18:25

## 2018-07-02 RX ADMIN — KETOROLAC TROMETHAMINE 15 MG: 30 INJECTION, SOLUTION INTRAMUSCULAR; INTRAVENOUS at 20:23

## 2018-07-02 RX ADMIN — PROPOFOL 150 MG: 10 INJECTION, EMULSION INTRAVENOUS at 13:09

## 2018-07-02 RX ADMIN — ONDANSETRON 4 MG: 2 INJECTION, SOLUTION INTRAMUSCULAR; INTRAVENOUS at 20:24

## 2018-07-02 RX ADMIN — SODIUM CHLORIDE, POTASSIUM CHLORIDE, SODIUM LACTATE AND CALCIUM CHLORIDE: 600; 310; 30; 20 INJECTION, SOLUTION INTRAVENOUS at 16:00

## 2018-07-02 RX ADMIN — DEXAMETHASONE SODIUM PHOSPHATE 4 MG: 4 INJECTION, SOLUTION INTRAMUSCULAR; INTRAVENOUS at 13:09

## 2018-07-02 RX ADMIN — FENTANYL CITRATE 50 MCG: 50 INJECTION, SOLUTION INTRAMUSCULAR; INTRAVENOUS at 19:13

## 2018-07-02 RX ADMIN — FENTANYL CITRATE 100 MCG: 50 INJECTION, SOLUTION INTRAMUSCULAR; INTRAVENOUS at 13:09

## 2018-07-02 RX ADMIN — AMITRIPTYLINE HYDROCHLORIDE 50 MG: 50 TABLET, FILM COATED ORAL at 22:25

## 2018-07-02 RX ADMIN — FUROSEMIDE 40 MG: 10 INJECTION, SOLUTION INTRAMUSCULAR; INTRAVENOUS at 22:23

## 2018-07-02 RX ADMIN — FENTANYL CITRATE 50 MCG: 50 INJECTION, SOLUTION INTRAMUSCULAR; INTRAVENOUS at 19:18

## 2018-07-02 RX ADMIN — TERAZOSIN HYDROCHLORIDE ANHYDROUS 10 MG: 5 CAPSULE ORAL at 22:25

## 2018-07-02 RX ADMIN — CEFAZOLIN SODIUM 1 G: 2 INJECTION, SOLUTION INTRAVENOUS at 17:15

## 2018-07-02 RX ADMIN — ATRACURIUM BESYLATE 50 MG: 10 INJECTION, SOLUTION INTRAVENOUS at 13:09

## 2018-07-02 RX ADMIN — SODIUM CHLORIDE 125 ML/HR: 9 INJECTION, SOLUTION INTRAVENOUS at 19:35

## 2018-07-02 RX ADMIN — HYDROXYCHLOROQUINE SULFATE 200 MG: 200 TABLET, FILM COATED ORAL at 22:25

## 2018-07-02 RX ADMIN — LIDOCAINE HYDROCHLORIDE 0.3 ML: 10 INJECTION, SOLUTION EPIDURAL; INFILTRATION; INTRACAUDAL; PERINEURAL at 11:20

## 2018-07-02 RX ADMIN — GLYCOPYRROLATE 0.2 MG: 0.2 INJECTION INTRAMUSCULAR; INTRAVENOUS at 14:23

## 2018-07-02 RX ADMIN — NEOSTIGMINE METHYLSULFATE 3 MG: 1 INJECTION, SOLUTION INTRAVENOUS at 18:25

## 2018-07-02 RX ADMIN — ATRACURIUM BESYLATE 20 MG: 10 INJECTION, SOLUTION INTRAVENOUS at 15:43

## 2018-07-02 RX ADMIN — GABAPENTIN 300 MG: 300 CAPSULE ORAL at 22:23

## 2018-07-02 RX ADMIN — FENTANYL CITRATE 50 MCG: 50 INJECTION, SOLUTION INTRAMUSCULAR; INTRAVENOUS at 18:25

## 2018-07-02 RX ADMIN — FENTANYL CITRATE 50 MCG: 50 INJECTION, SOLUTION INTRAMUSCULAR; INTRAVENOUS at 16:15

## 2018-07-02 RX ADMIN — ATRACURIUM BESYLATE 30 MG: 10 INJECTION, SOLUTION INTRAVENOUS at 14:40

## 2018-07-02 RX ADMIN — GLYCOPYRROLATE 0.4 MG: 0.2 INJECTION INTRAMUSCULAR; INTRAVENOUS at 18:25

## 2018-07-02 NOTE — BRIEF OP NOTE
LAPAROSCOPIC SUPRACERVICAL HYSTERECTOMY SACROCOLPOPEXY WITH DAVINCI ROBOT  Progress Note    Janette Diaz  7/2/2018    Pre-op Diagnosis:   Uterine prolapse [N81.4]       Post-Op Diagnosis Codes:     * Uterine prolapse [N81.4]    Procedure/CPT® Codes:      Procedure(s):  LAPAROSCOPIC SUPRACERVICAL HYSTERECTOMY SACROCOLPOPEXY WITH DAVINCI ROBOT  ANTERIOR AND POSTERIOR VAGINAL REPAIR    Surgeon(s):  MD Mihai Tamez MD    Anesthesia: General    Staff:   Circulator: Audrey Gillette RN; Toyin Oneal RN  Scrub Person: Ritesh Cam; Joselin Hanley; Kavya Perera RN; Louisa Holman; Yahaira Membreno  Nursing Assistant: Agnes Lei  Assistant: TAMARA Witt; ANABELL Funk    Estimated Blood Loss: 400 mL    Urine Voided: 700 ml    Specimens:                ID Type Source Tests Collected by Time   A : uterus bilateral tubes and ovaries Tissue Uterus with Ovaries and Fallopian Tubes TISSUE PATHOLOGY EXAM Mihai Best MD 7/2/2018 1812         Drains:  none    Findings: complete uterine prolapse with cystocele and rectocele    Complications: non e       Mihai Best MD     Date: 7/2/2018  Time: 6:35 PM

## 2018-07-02 NOTE — ANESTHESIA PREPROCEDURE EVALUATION
Anesthesia Evaluation     Patient summary reviewed and Nursing notes reviewed   history of anesthetic complications:               Airway   Mallampati: III  Dental      Pulmonary - negative pulmonary ROS   Cardiovascular     (+) hypertension,       Neuro/Psych- negative ROS  GI/Hepatic/Renal/Endo    (+)   diabetes mellitus,     Musculoskeletal (-) negative ROS    Abdominal    Substance History - negative use     OB/GYN    (+) Pregnant,         Other                      Anesthesia Plan    ASA 3     general

## 2018-07-02 NOTE — OP NOTE
OPERATIVE NOTE    2018    Preoperative diagnosis: Total uterine procidentia, cystocele, rectocele    Postoperative diagnosis: Same    Anesthesia: General    Estimated blood loss: 400 mL    Brief history and indications: ID: 72 y.o.  with a history of chronic uterine prolapse.  She had failed a trial of pessary.  Apparently her mother and grandmother had similar issues required exploratory laparotomy.  Options were discussed with the patient and due to the total prolapse decision may proceed with hysterectomy.  The patient had been sexually active up until recently and desired remains sexually active.  Decision was therefore made to proceed with a subtotal/supracervical hysterectomy along with bilateral salpingo-oophorectomy along with a sacral cervical pexy.  She will also need anterior posterior vaginal colporrhaphy's.  She had opted as can have her questions answered preoperatively both in the office and immediately prior to surgery.  She understood expected outcomes.        Procedure: The patient was taken to the operating room and placed under general anesthesia without complication.  She she was prepped and draped in the standard fashion.  She was tested for da Elysia surgery in steep Trendelenburg.  A timeout was undertaken prior to proceeding.  Exam under anesthesia was done.  A Richard catheter was placed to straight drainage.  The uterus was completely out of the vagina.  The cervix was proxy 6 cm in diameter.  There is associated cystocele and rectocele.  The tissue was very thickened.  Dilute Terence-Synephrine was injected anteriorly and made a rectangular incision carried this down through the vaginal mucosa and undermined it to excise this tissue.  The lateral vaginal mucosa was mobilized.  I used a series of modified mattress sutures to pull together the underlying vesicovaginal vaginal tissue.  Once this was done in 2 layers I was able to close the reapproximate the vaginal mucosa with a chromic  stitch.  Some interrupted chromics were used near the cervix.  Posteriorly this was taken down in a similar fashion with a rectangular shape vaginal mucosa removed after undermining at the rectovaginal fascia and tissues were reapproximated with interrupted modified mattress sutures to a point that the remaining vaginal mucosa could be pulled back together.  The uterus for was then placed back inside the vagina are seen to be adequate depth and no significant lateral relaxation.  A large Lesley ring was tied down with the eight centimeter cannula although the cervix sounded to 6.    After re-gloving, attention was directed laparoscopically.  A supra umbilical incision was made after Marcaine was placed.  The [employer] GelPort was inserted with the camera port and 2 working ports in place.  The pelvis was inspected.  After ascertaining we could proceed with surgery in the local was placed prior to the right and left da Elysia ports being placed under direct visualization.  The third arm was placed in the left upper quadrant.  The da Elysia was brought in for docking.  Once it was successfully docked and instruments placed, I left for the console.    On the left side the infundibulopelvic ligament was identified and I came across this with bipolar cautery across the round ligament down to along the uterus to the level cervical vessels which were cauterized prior to being cut.  Dissection continued across the left utero-ovarian ligaments and round ligament down alongside the uterus to the level of the cervical vessels.  Once these were cauterized attention was directed to the right side where the utero-ovarian ligaments, round ligament and broad ligament were cauterized prior to being cut down to level of the cervical vessels which were also cauterized prior to being cut.  A bladder flap was created.  The anterior Lesley ring was identified.  Monopolar cautery was used to dissect across the lower uterine segment and  above the cervix.  The anatomy is somewhat distorted because the suturing posteriorly.  I had cut through some of these Vicryl sutures to free up the uterus.   The pelvis was irrigated and suctioned for blood clots.  I undermined the anterior cervix and down to the bladder until ran into bleeding and prior suturing.  Posteriorly the sutures are much closer we only had about 3 cm with about 5 anteriorly.  The sacral promontory had been difficult to visualize prior to docking the robot.  We had to bring a fan in to help hold the bowel out of the way to identify the sacral promontory.  I had to remove about 3 fat pads and then the sacral Promontory was identified clear of vessels and ureter.  The middle sacral artery was cauterized above and below to avoid bleeding.  I placed a Ethibond stitch to the sacral promontory for later identification.  I made a tunnel to the right side under the peritoneum down towards the level of the cervix.  The Y mesh was placed after being cut I used double double-armed Ethibond and so this down and somewhat of a running stitch on the right and then the other arm on the left side.  Posteriorly 3 interrupted Ethibonds were placed to anchor the posterior aspect of the Y mesh.  The mesh was then pulled through the previously made tunnel on the right side up to and laid across the sacral promontory.  Due to visualization issues I had to place 2 more bites of Ethibond and then bringing the needle and then the suture and through the mesh and tied these down without undue pressure on the mesh.  The peritoneum was closed with a Vicryl over the cuff and a  tie was used.  The sacral promontory was closed with a Vicryl tie.  Hemostasis was adequate.  The uterus had been removed and tagged to the left sidewall.  I freed this up and grasp it with a large 10 mm graspers and brought it up to the [facility].  The abdomen was deflated.  The tissue was rather soft.  It was removed using a combination of  late thyroid clamps Allis clamps and knife blade to partially morcellated on the abdomen.  It was removed intact.  The abdomen was reinflated and inspected and found to be hemostatic.  The da Elysia ports were removed under direct visualization.  The applied port was removed.  The fascia was closed with a 0 Vicryl which incorporated the peritoneum.  The skin was closed with 30 plain as were the da Elysia ports.  Vaginally I inspected the vagina and had to replace the suture where the Vicryl had been cut laparoscopically.  This closed the vagina which was actually hemostatic without the suture.  The cervix was well elevated.   Vaginal instruments had been removed with exception of a Richard catheter remaining in place. Sponge and needle counts were correct.  Patient tolerated the procedure well and left for the postop recovery room in stable condition.      Mihai Best M.D.

## 2018-07-02 NOTE — ANESTHESIA POSTPROCEDURE EVALUATION
Patient: Janette Diaz    Procedure Summary     Date:  07/02/18 Room / Location:   [facility] /  [facility]    Anesthesia Start:  1303 Anesthesia Stop:  1844    Procedures:       LAPAROSCOPIC SUPRACERVICAL HYSTERECTOMY SACROCOLPOPEXY WITH DAVINCI ROBOT (N/A Abdomen)      ANTERIOR AND POSTERIOR VAGINAL REPAIR (N/A Uterus) Diagnosis:       Uterine prolapse      (Uterine prolapse [N81.4])    Surgeon:  Mihai Best MD Provider:  Carmelo Galvan MD    Anesthesia Type:  general ASA Status:  3          Anesthesia Type: general  Last vitals  BP   157/62 (07/02/18 1121)   Temp   97.8 °F (36.6 °C) (07/02/18 1121)   Pulse   67 (07/02/18 1121)   Resp   18 (07/02/18 1121)     SpO2   95 % (07/02/18 1121)     Post Anesthesia Care and Evaluation    Patient location during evaluation: PACU  Patient participation: complete - patient cannot participate  Level of consciousness: lethargic  Pain score: 1  Pain management: adequate  Airway patency: patent  Anesthetic complications: No anesthetic complications  PONV Status: none  Cardiovascular status: acceptable  Respiratory status: acceptable  Hydration status: acceptable

## 2018-07-03 VITALS
BODY MASS INDEX: 27.66 KG/M2 | DIASTOLIC BLOOD PRESSURE: 65 MMHG | RESPIRATION RATE: 18 BRPM | WEIGHT: 162 LBS | HEART RATE: 81 BPM | OXYGEN SATURATION: 98 % | SYSTOLIC BLOOD PRESSURE: 143 MMHG | TEMPERATURE: 98.1 F | HEIGHT: 64 IN

## 2018-07-03 PROBLEM — S31.40XA: Status: ACTIVE | Noted: 2018-07-03

## 2018-07-03 LAB
ANION GAP SERPL CALCULATED.3IONS-SCNC: 8 MMOL/L (ref 3–11)
BUN BLD-MCNC: 28 MG/DL (ref 9–23)
BUN/CREAT SERPL: 14 (ref 7–25)
CALCIUM SPEC-SCNC: 7.3 MG/DL (ref 8.7–10.4)
CHLORIDE SERPL-SCNC: 109 MMOL/L (ref 99–109)
CO2 SERPL-SCNC: 22 MMOL/L (ref 20–31)
CREAT BLD-MCNC: 2 MG/DL (ref 0.6–1.3)
DEPRECATED RDW RBC AUTO: 49.2 FL (ref 37–54)
ERYTHROCYTE [DISTWIDTH] IN BLOOD BY AUTOMATED COUNT: 14.1 % (ref 11.3–14.5)
GFR SERPL CREATININE-BSD FRML MDRD: 24 ML/MIN/1.73
GLUCOSE BLD-MCNC: 229 MG/DL (ref 70–100)
GLUCOSE BLDC GLUCOMTR-MCNC: 156 MG/DL (ref 70–130)
GLUCOSE BLDC GLUCOMTR-MCNC: 209 MG/DL (ref 70–130)
GLUCOSE BLDC GLUCOMTR-MCNC: 232 MG/DL (ref 70–130)
HCT VFR BLD AUTO: 22.1 % (ref 34.5–44)
HGB BLD-MCNC: 7.1 G/DL (ref 11.5–15.5)
MCH RBC QN AUTO: 30.9 PG (ref 27–31)
MCHC RBC AUTO-ENTMCNC: 32.1 G/DL (ref 32–36)
MCV RBC AUTO: 96.1 FL (ref 80–99)
PLATELET # BLD AUTO: 148 10*3/MM3 (ref 150–450)
PMV BLD AUTO: 9.7 FL (ref 6–12)
POTASSIUM BLD-SCNC: 5.3 MMOL/L (ref 3.5–5.5)
RBC # BLD AUTO: 2.3 10*6/MM3 (ref 3.89–5.14)
SODIUM BLD-SCNC: 139 MMOL/L (ref 132–146)
WBC NRBC COR # BLD: 12.42 10*3/MM3 (ref 3.5–10.8)

## 2018-07-03 PROCEDURE — 63710000001 IBUPROFEN 600 MG TABLET: Performed by: OBSTETRICS & GYNECOLOGY

## 2018-07-03 PROCEDURE — A9270 NON-COVERED ITEM OR SERVICE: HCPCS | Performed by: OBSTETRICS & GYNECOLOGY

## 2018-07-03 PROCEDURE — 85027 COMPLETE CBC AUTOMATED: CPT | Performed by: OBSTETRICS & GYNECOLOGY

## 2018-07-03 PROCEDURE — 63710000001 SIMETHICONE 80 MG CHEWABLE TABLET: Performed by: OBSTETRICS & GYNECOLOGY

## 2018-07-03 PROCEDURE — 25010000002 KETOROLAC TROMETHAMINE PER 15 MG: Performed by: OBSTETRICS & GYNECOLOGY

## 2018-07-03 PROCEDURE — 63710000001 PANTOPRAZOLE 40 MG TABLET DELAYED-RELEASE: Performed by: OBSTETRICS & GYNECOLOGY

## 2018-07-03 PROCEDURE — 63710000001 GABAPENTIN 300 MG CAPSULE: Performed by: OBSTETRICS & GYNECOLOGY

## 2018-07-03 PROCEDURE — 63710000001 HYDROCODONE-ACETAMINOPHEN 5-325 MG TABLET: Performed by: OBSTETRICS & GYNECOLOGY

## 2018-07-03 PROCEDURE — 63710000001 LEVOTHYROXINE 75 MCG TABLET: Performed by: OBSTETRICS & GYNECOLOGY

## 2018-07-03 PROCEDURE — 63710000001 HYDROCODONE-ACETAMINOPHEN 10-325 MG TABLET: Performed by: OBSTETRICS & GYNECOLOGY

## 2018-07-03 PROCEDURE — 82962 GLUCOSE BLOOD TEST: CPT

## 2018-07-03 PROCEDURE — 63710000001 INSULIN LISPRO (HUMAN) PER 5 UNITS: Performed by: OBSTETRICS & GYNECOLOGY

## 2018-07-03 PROCEDURE — 80048 BASIC METABOLIC PNL TOTAL CA: CPT | Performed by: OBSTETRICS & GYNECOLOGY

## 2018-07-03 PROCEDURE — 63710000001 DILTIAZEM CD 300 MG CAPSULE SUSTAINED-RELEASE 24 HR: Performed by: OBSTETRICS & GYNECOLOGY

## 2018-07-03 PROCEDURE — 25010000003 CEFAZOLIN IN DEXTROSE 2-4 GM/100ML-% SOLUTION: Performed by: OBSTETRICS & GYNECOLOGY

## 2018-07-03 PROCEDURE — 63710000001 PROMETHAZINE PER 12.5 MG: Performed by: OBSTETRICS & GYNECOLOGY

## 2018-07-03 RX ORDER — HYDROCODONE BITARTRATE AND ACETAMINOPHEN 5; 325 MG/1; MG/1
1 TABLET ORAL EVERY 8 HOURS PRN
Qty: 20 TABLET | Refills: 0 | Status: SHIPPED | OUTPATIENT
Start: 2018-07-03 | End: 2018-07-16 | Stop reason: SDUPTHER

## 2018-07-03 RX ADMIN — SODIUM CHLORIDE 125 ML/HR: 9 INJECTION, SOLUTION INTRAVENOUS at 08:25

## 2018-07-03 RX ADMIN — HYDROCODONE BITARTRATE AND ACETAMINOPHEN 1 TABLET: 5; 325 TABLET ORAL at 00:08

## 2018-07-03 RX ADMIN — SIMETHICONE CHEW TAB 80 MG 80 MG: 80 TABLET ORAL at 15:18

## 2018-07-03 RX ADMIN — PANTOPRAZOLE SODIUM 40 MG: 40 TABLET, DELAYED RELEASE ORAL at 06:40

## 2018-07-03 RX ADMIN — KETOROLAC TROMETHAMINE 15 MG: 30 INJECTION, SOLUTION INTRAMUSCULAR; INTRAVENOUS at 03:52

## 2018-07-03 RX ADMIN — INSULIN LISPRO 3 UNITS: 100 INJECTION, SOLUTION INTRAVENOUS; SUBCUTANEOUS at 08:18

## 2018-07-03 RX ADMIN — HYDROCODONE BITARTRATE AND ACETAMINOPHEN 1 TABLET: 5; 325 TABLET ORAL at 17:52

## 2018-07-03 RX ADMIN — CEFAZOLIN SODIUM 2 G: 2 INJECTION, SOLUTION INTRAVENOUS at 00:08

## 2018-07-03 RX ADMIN — GABAPENTIN 300 MG: 300 CAPSULE ORAL at 06:40

## 2018-07-03 RX ADMIN — PROMETHAZINE HYDROCHLORIDE 12.5 MG: 12.5 TABLET ORAL at 00:08

## 2018-07-03 RX ADMIN — GABAPENTIN 300 MG: 300 CAPSULE ORAL at 13:17

## 2018-07-03 RX ADMIN — DILTIAZEM HYDROCHLORIDE 300 MG: 300 CAPSULE, EXTENDED RELEASE ORAL at 08:20

## 2018-07-03 RX ADMIN — INSULIN LISPRO 3 UNITS: 100 INJECTION, SOLUTION INTRAVENOUS; SUBCUTANEOUS at 11:23

## 2018-07-03 RX ADMIN — LEVOTHYROXINE SODIUM 75 MCG: 75 TABLET ORAL at 06:40

## 2018-07-03 RX ADMIN — CEFAZOLIN SODIUM 2 G: 2 INJECTION, SOLUTION INTRAVENOUS at 08:23

## 2018-07-03 RX ADMIN — IBUPROFEN 600 MG: 600 TABLET ORAL at 00:08

## 2018-07-03 RX ADMIN — NATEGLINIDE 120 MG: 60 TABLET ORAL at 08:24

## 2018-07-03 RX ADMIN — NATEGLINIDE 120 MG: 60 TABLET ORAL at 15:19

## 2018-07-03 RX ADMIN — HYDROCODONE BITARTRATE AND ACETAMINOPHEN 1 TABLET: 10; 325 TABLET ORAL at 13:20

## 2018-07-03 NOTE — H&P (VIEW-ONLY)
Subjective   Janette Diaz is a 72 y.o. year old  who is scheduled  for surgery due to symptomatic uterine prolapse.  This is been present for sometime but more recently has become more symptomatic to the point where she has difficulty sitting or walking.  She's not able to be sexually active any longer with the uterus dropping down.  She has no bladder complaints.  She does have issues with constipation and a thin rectovaginal septum.  She had a 9+ pound vaginal delivery in the past.  We had discussed options and elected to proceed with a left scopic super cervical hysterectomy with a sacral cervico pexy.  Removal of tubes and ovaries.  She also will need anterior posterior vaginal colporrhaphy.  This was all discussed with her and she voiced understanding.  Options had previously been discussed.  She had failed a pessary.    Past Medical History:   Diagnosis Date   • Chronic kidney disease    • Diabetes mellitus    • Disease of thyroid gland    • Fibromyalgia    • Hyperlipidemia    • Hypertension    • Osteoarthritis    • Varicose veins      Past Surgical History:   Procedure Laterality Date   • CATARACT EXTRACTION WITH INTRAOCULAR LENS IMPLANT Bilateral    • CHOLECYSTECTOMY     • REPLACEMENT TOTAL KNEE BILATERAL     • TUBAL ABDOMINAL LIGATION       Social History     Social History   • Marital status:      Social History Main Topics   • Smoking status: Never Smoker   • Smokeless tobacco: Never Used   • Alcohol use Yes      Comment: OCCASIONAL   • Drug use: No   • Sexual activity: Not Currently     Partners: Male     Other Topics Concern   • Not on file       Current Outpatient Prescriptions:   •  allopurinol (ZYLOPRIM) 100 MG tablet, Take 1 tablet by mouth Daily., Disp: , Rfl:   •  amitriptyline (ELAVIL) 50 MG tablet, Take 50 mg by mouth nightly., Disp: , Rfl:   •  aspirin 81 MG chewable tablet, Take 81 mg by mouth daily, Disp: , Rfl:   •  conjugated estrogens (PREMARIN) 0.625 MG/GM vaginal cream,  Use 0.5 - 2.0 grams intravaginally 1-3 times per week to control symptoms., Disp: 1 each, Rfl: 12  •  diltiaZEM CD (CARTIA XT) 300 MG 24 hr capsule, Take 1 capsule by mouth Daily., Disp: , Rfl:   •  doxazosin (CARDURA) 8 MG tablet, Take 1 tablet by mouth Daily., Disp: , Rfl:   •  furosemide (LASIX) 20 MG tablet, Take 20 mg by mouth daily, Disp: , Rfl:   •  gabapentin (NEURONTIN) 300 MG capsule, Take 1 capsule by mouth 3 (Three) Times a Day., Disp: , Rfl:   •  hydroxychloroquine (PLAQUENIL) 200 MG tablet, Take 200 mg by mouth Daily., Disp: , Rfl:   •  LANTUS SOLOSTAR 100 UNIT/ML injection pen, INJECT 30 UNITS EVERY MORNING, Disp: , Rfl: 0  •  levothyroxine (SYNTHROID, LEVOTHROID) 75 MCG tablet, Take 1 tablet by mouth Daily., Disp: , Rfl:   •  nateglinide (STARLIX) 120 MG tablet, Take 1 tablet by mouth 3 (three) times a day., Disp: , Rfl:   •  omeprazole (PriLOSEC) 20 MG capsule, Take 20 mg by mouth daily., Disp: , Rfl:   •  pravastatin (PRAVACHOL) 20 MG tablet, Take 20 mg by mouth daily., Disp: , Rfl:   •  predniSONE (DELTASONE) 10 MG tablet, Take 1 tablet by mouth Daily., Disp: , Rfl:   •  vitamin D (ERGOCALCIFEROL) 43599 UNITS capsule capsule, Take 500,000 Units by mouth once a week Indications: take on Saturday, Disp: , Rfl:     Allergies   Allergen Reactions   • Codeine Rash     intolerate to codeine makes pt vomit must have phenerghan or zofran to take this     Smoking status: Never Smoker                                                              Smokeless tobacco: Never Used                        Review of Systems constipation no urinary complaints.      Objective   /70   Resp 16   Wt 74.4 kg (164 lb)   LMP  (LMP Unknown) Comment: POST-MENOPAUSAL  BMI 28.15 kg/m²   General: well developed; well nourished  no acute distress  appears stated age  obese - Body mass index is 28.15 kg/m².   Heart: regular rate and rhythm   Lungs: breathing is unlabored  clear to auscultation bilaterally   Abdomen: soft,  non-tender; no masses  no umbilical or inginual hernias are present  no hepato-splenomegaly   Pelvis:: Not performed.  This previously was consistent with total uterine prolapse with a elongated cervix no adnexal masses cystocele present and a thin rectovaginal septum.          Assessment   1. Total uterine prolapse options discussed.  Given her desire to remain sexually active I think a sacral cervical pexy with subtotal hysterectomy bilateral salpingo-oophorectomy and anterior posterior repair would be her best option.  Procedure was reviewed along with hospital and postoperative care.  2. Also medical problems including diabetes, thyroid, hypertension, hypercholesterolemia renal disease, osteopenia and varicose veins.  Postoperative nausea and vomiting     Plan   1. Da Elysia-assisted subtotal/supracervical hysterectomy bilateral salpingo-oophorectomy, sacral cervico pexy, anterior posterior repair  2. Nothing by mouth after midnight  3. Preadmission testing Sunday prior to surgery  4. Remind anesthesia regarding postoperative nausea and vomiting      Mihai Best M.D.  6/26/2018

## 2018-07-03 NOTE — PROGRESS NOTES
Discharge Planning Assessment  [facility]     Patient Name: Janette Diaz  MRN: [id number]  Today's Date: 7/3/2018    Admit Date: 7/2/2018          Discharge Needs Assessment     Row Name 07/03/18 1145       Living Environment    Lives With spouse    Current Living Arrangements home/apartment/condo    Primary Care Provided by self    Provides Primary Care For no one    Family Caregiver if Needed spouse    Quality of Family Relationships supportive    Able to Return to Prior Arrangements yes       Resource/Environmental Concerns    Resource/Environmental Concerns none    Transportation Concerns car, none       Transition Planning    Patient/Family Anticipates Transition to home    Patient/Family Anticipated Services at Transition none    Transportation Anticipated family or friend will provide       Discharge Needs Assessment    Readmission Within the Last 30 Days no previous admission in last 30 days    Concerns to be Addressed no discharge needs identified    Equipment Currently Used at Home none    Anticipated Changes Related to Illness none    Equipment Needed After Discharge none            Discharge Plan     Row Name 07/03/18 1146       Plan    Plan Home    Patient/Family in Agreement with Plan yes    Plan Comments Spoke with pt's  at bedside as pt was sleeping. Pt is independent in ADL's and IADL's. Pt's PCP is Zahida Newsome. Pt has had HH in the past after a knee surgery for PT, however pt's  declines it at this time. Pt plans to discharge home with her  when medically ready. Pt has prescription coverage through an [employer] supplement. Pt and pt's  had no current discharge needs or concerns.     Final Discharge Disposition Code 01 - home or self-care        Destination     No service coordination in this encounter.      Durable Medical Equipment     No service coordination in this encounter.      Dialysis/Infusion     No service coordination in this encounter.      Home Medical Care      No service coordination in this encounter.      Social Care     No service coordination in this encounter.        Expected Discharge Date and Time     Expected Discharge Date Expected Discharge Time    Jul 3, 2018               Demographic Summary     Row Name 07/03/18 1144       General Information    Reason for Consult discharge planning            Functional Status     Row Name 07/03/18 1145       Functional Status, IADL    Medications independent    Meal Preparation independent    Housekeeping independent    Laundry independent    Shopping independent            Psychosocial    No documentation.           Abuse/Neglect    No documentation.           Legal    No documentation.           Substance Abuse    No documentation.           Patient Forms    No documentation.         RIAN Broussard

## 2018-07-03 NOTE — DISCHARGE SUMMARY
BHAVNA Marques  Janette Diaz  : 1945  MRN: [id number]  CSN: [id number]    Discharge Summary      Date of Admission: 2018   Date of Discharge:    Discharge Diagnosis: 1. 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  This was very symptomatic.  She is having difficulty sitting and with activities of daily living.  She desired more definitive therapy.  She had failed a trial of pessary.  Options were discussed she wanted to remain sexually active.  Decision may proceed with a supracervical hysterectomy, sacrocolpopexy and due to the extent of prolapse anterior posterior repair.     Hospital Course: Patient underwent the above procedure and tolerated this well.  Postoperatively she had a little bit of shortness of breath when she was up walking.  Her lungs were clear.  This may been associated with not taking her Lasix today.  Sugars in the morning were 204 which is not unusual for her.  She is having and minimal amount of vaginal spotting light brown.  No significant complaints of incisional pain.  She had developed urge incontinence and couldn't get to the bathroom in time.  I was hoping this was related to intravenous fluids.  I gave her a sample of meter Myrbetriq 25 mg take daily at home she had no history of glaucoma discussed dry mouth and constipation as possible side effects.  We'll go over these when I see her back in about 2 weeks.  She has arthritis and takes 650 Tylenol 4 times a day.  Told not to use nonsteroidals due to kidney disease.  Discussed how much narcotic pain medicine she would need we'll give her 25 mg 4 tabs    Pending Studies: Pathology    Condition at discharge: gradually improving   Discharge Medications:     Your medication list      START taking these medications      Instructions Last Dose Given Next Dose Due   HYDROcodone-acetaminophen 5-325 MG per tablet  Commonly known as:  NORCO      Take 1 tablet by mouth Every 8 (Eight) Hours As Needed for Severe Pain .       Mirabegron ER 25 MG tablet sustained-release 24 hour 24 hr tablet  Commonly known as:  MYRBETRIQ      Take 1 tablet by mouth Daily.          CHANGE how you take these medications      Instructions Last Dose Given Next Dose Due   conjugated estrogens 0.625 MG/GM vaginal cream  Commonly known as:  PREMARIN  What changed:  · how much to take  · how to take this  · when to take this  · additional instructions      Use 0.5 - 2.0 grams intravaginally 1-3 times per week to control symptoms.          CONTINUE taking these medications      Instructions Last Dose Given Next Dose Due   allopurinol 100 MG tablet  Commonly known as:  ZYLOPRIM      Take 1 tablet by mouth Daily.       amitriptyline 50 MG tablet  Commonly known as:  ELAVIL      Take 50 mg by mouth nightly.       aspirin 81 MG chewable tablet      Take 81 mg by mouth daily       CARTIA  MG 24 hr capsule  Generic drug:  diltiaZEM CD      Take 1 capsule by mouth Daily.       doxazosin 8 MG tablet  Commonly known as:  CARDURA      Take 1 tablet by mouth Daily.       furosemide 20 MG tablet  Commonly known as:  LASIX      Take 20 mg by mouth daily       gabapentin 300 MG capsule  Commonly known as:  NEURONTIN      Take 1 capsule by mouth 3 (Three) Times a Day.       hydroxychloroquine 200 MG tablet  Commonly known as:  PLAQUENIL      Take 200 mg by mouth Daily.       LANTUS SOLOSTAR 100 UNIT/ML injection pen  Generic drug:  Insulin Glargine      INJECT 30 UNITS EVERY MORNING       levothyroxine 75 MCG tablet  Commonly known as:  SYNTHROID, LEVOTHROID      Take 75 mcg by mouth Daily.       nateglinide 120 MG tablet  Commonly known as:  STARLIX      Take 1 tablet by mouth 3 (three) times a day.        omeprazole 20 MG capsule  Commonly known as:  priLOSEC      Take 20 mg by mouth daily.       pravastatin 20 MG tablet  Commonly known as:  PRAVACHOL      Take 20 mg by mouth daily.       VITAMIN D2 PO      Take 50,000 Units by mouth 1 (One) Time Per Week.             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    Mihai Best MD  July 3, 2018

## 2018-07-03 NOTE — PROGRESS NOTES
BHAVNA Jerald Diaz  : 1945  MRN: [id number]  [facility]: [id number]    Post-operative Day #  Subjective   Her pain is well controlled. She has not passed gas since surgery. She has not yet had a bowel movement. She slept well last night   Check question regarding her EpiPen.  I think that was written for postoperatively she usually takes that in the morning sugar was 204.  She is also on sliding scale last night.  Surgery discussed; will hopefully be feeling well enough to go home later today and I'll check back later.  Catheter just came out of her bladder not had a chance to void.     Objective     Min/max vitals past 24 hours:   Temp  Min: 96.7 °F (35.9 °C)  Max: 98.9 °F (37.2 °C)  BP  Min: 101/67  Max: 159/69  Pulse  Min: 67  Max: 87  Resp  Min: 16  Max: 18        I/O last 3 completed shifts:  In: 2200 [I.V.:2200]  Out: 3225 [Urine:2625; Blood:600]    General: well developed; well nourished  no acute distress   Abdomen: soft, non-tender; no masses  no umbilical or inginual hernias are present  no hepato-splenomegaly  incision is clean, dry, intact and without drainage  Slightly tympanic   Pelvic: Not performed   Ext: Calves NT            Assessment   1. Post-op Day #1 S/P A&P repair; da Elysia-assisted supracervical hysterectomy ; sacral cervical pexy     Plan   1. Advance diet  2. I will check later today since she is ready to go home passing gas etc.    Mihai Best MD  7/3/2018  7:50 AM

## 2018-07-03 NOTE — PLAN OF CARE
Problem: Patient Care Overview  Goal: Plan of Care Review  Outcome: Ongoing (interventions implemented as appropriate)   07/03/18 0336   Plan of Care Review   Progress improving   Coping/Psychosocial   Plan of Care Reviewed With patient   OTHER   Outcome Summary Pain controlled with oral and IV meds. UOP-WNL. d/c F/c in the a.m. Pt slept well throughout the night.      Goal: Individualization and Mutuality  Outcome: Ongoing (interventions implemented as appropriate)    Goal: Discharge Needs Assessment  Outcome: Ongoing (interventions implemented as appropriate)    Goal: Interprofessional Rounds/Family Conf  Outcome: Ongoing (interventions implemented as appropriate)      Problem: Pain, Acute (Adult)  Goal: Identify Related Risk Factors and Signs and Symptoms  Outcome: Ongoing (interventions implemented as appropriate)    Goal: Acceptable Pain Control/Comfort Level  Outcome: Ongoing (interventions implemented as appropriate)      Problem: Nausea/Vomiting (Adult)  Goal: Identify Related Risk Factors and Signs and Symptoms  Outcome: Ongoing (interventions implemented as appropriate)    Goal: Symptom Relief  Outcome: Ongoing (interventions implemented as appropriate)    Goal: Adequate Hydration  Outcome: Ongoing (interventions implemented as appropriate)      Problem: Hysterectomy (Adult)  Goal: Signs and Symptoms of Listed Potential Problems Will be Absent, Minimized or Managed (Hysterectomy)  Outcome: Ongoing (interventions implemented as appropriate)    Goal: Anesthesia/Sedation Recovery  Outcome: Ongoing (interventions implemented as appropriate)

## 2018-07-05 LAB
CYTO UR: NORMAL
LAB AP CASE REPORT: NORMAL
LAB AP CLINICAL INFORMATION: NORMAL
PATH REPORT.FINAL DX SPEC: NORMAL
PATH REPORT.GROSS SPEC: NORMAL

## 2018-07-16 ENCOUNTER — OFFICE VISIT (OUTPATIENT)
Dept: OBSTETRICS AND GYNECOLOGY | Facility: CLINIC | Age: 73
End: 2018-07-16

## 2018-07-16 VITALS
DIASTOLIC BLOOD PRESSURE: 70 MMHG | SYSTOLIC BLOOD PRESSURE: 118 MMHG | WEIGHT: 161 LBS | BODY MASS INDEX: 27.64 KG/M2 | RESPIRATION RATE: 16 BRPM

## 2018-07-16 DIAGNOSIS — R82.90 ABNORMAL FINDING IN URINE: ICD-10-CM

## 2018-07-16 DIAGNOSIS — Z09 SURGERY FOLLOW-UP: ICD-10-CM

## 2018-07-16 DIAGNOSIS — S31.40XD: Primary | ICD-10-CM

## 2018-07-16 PROBLEM — Z87.898 H/O URINARY INCONTINENCE: Status: ACTIVE | Noted: 2018-07-16

## 2018-07-16 PROBLEM — N81.4 PROLAPSED UTERUS: Status: RESOLVED | Noted: 2018-07-02 | Resolved: 2018-07-16

## 2018-07-16 LAB
BILIRUB BLD-MCNC: ABNORMAL MG/DL
CLARITY, POC: CLEAR
COLOR UR: YELLOW
GLUCOSE UR STRIP-MCNC: NEGATIVE MG/DL
KETONES UR QL: NEGATIVE
LEUKOCYTE EST, POC: NEGATIVE
NITRITE UR-MCNC: NEGATIVE MG/ML
PH UR: 5 [PH] (ref 5–8)
PROT UR STRIP-MCNC: NEGATIVE MG/DL
RBC # UR STRIP: ABNORMAL /UL
SP GR UR: 1.01 (ref 1–1.03)
UROBILINOGEN UR QL: NORMAL

## 2018-07-16 PROCEDURE — 99024 POSTOP FOLLOW-UP VISIT: CPT | Performed by: OBSTETRICS & GYNECOLOGY

## 2018-07-16 PROCEDURE — 81003 URINALYSIS AUTO W/O SCOPE: CPT | Performed by: OBSTETRICS & GYNECOLOGY

## 2018-07-16 PROCEDURE — 87086 URINE CULTURE/COLONY COUNT: CPT | Performed by: OBSTETRICS & GYNECOLOGY

## 2018-07-16 PROCEDURE — 87077 CULTURE AEROBIC IDENTIFY: CPT | Performed by: OBSTETRICS & GYNECOLOGY

## 2018-07-16 PROCEDURE — 87186 SC STD MICRODIL/AGAR DIL: CPT | Performed by: OBSTETRICS & GYNECOLOGY

## 2018-07-16 RX ORDER — NITROFURANTOIN 25; 75 MG/1; MG/1
100 CAPSULE ORAL 2 TIMES DAILY
Qty: 6 CAPSULE | Refills: 0 | Status: SHIPPED | OUTPATIENT
Start: 2018-07-16 | End: 2018-07-18 | Stop reason: SDUPTHER

## 2018-07-16 RX ORDER — HYDROCODONE BITARTRATE AND ACETAMINOPHEN 5; 325 MG/1; MG/1
1 TABLET ORAL EVERY 8 HOURS PRN
Qty: 10 TABLET | Refills: 0 | Status: SHIPPED | OUTPATIENT
Start: 2018-07-16 | End: 2018-10-04 | Stop reason: HOSPADM

## 2018-07-16 NOTE — PROGRESS NOTES
Subjective   Chief Complaint   Patient presents with   • Post-op     s/p LSH/Sacrocolpopexy 18   • Urinary Incontinence     incisional pain     Janette Diaz is a 72 y.o. year old  presenting to be seen for her post-operative visit.  She had a subtotal hysterectomy with sacral cervical pexy and anterior posterior repair.  Currently she reports problems with urinary incontinence whenever she gets up.  Not really related to stress.  I gave her some medications but these have not seemed to help.  Did not check urinalysis so we'll need to do that today.  She is also complaining of some pain in her left upper quadrant.  Starts near the umbilical incision and works its way laterally..    The following portions of the patient's history were reviewed and updated as appropriate:problem list, current medications and allergies    Review of Systems please see above     Objective   /70   Resp 16   Wt 73 kg (161 lb)   LMP  (LMP Unknown) Comment: POST-MENOPAUSAL  BMI 27.64 kg/m²     General:  well developed; well nourished  no acute distress  appears stated age   Abdomen: no umbilical or inginual hernias are present  no hepato-splenomegaly  incision is healing, clean, dry, intact, without drainage and erythematous  She is tender in left upper quadrant but there is no rebound question CVAT   Pelvis: Clinical staff was present for exam  External genitalia:  normal appearance of the external genitalia including Bartholin's and [place]'s glands.  :  urethral meatus normal; urethral hypermobility is absent. A sterile Q-tip with lidocaine gel was used and there is no angle change with Valsalva.  Vaginal:  atrophic mucosal changes are present; Sutures present some spotting associated one near the superior cervix silver nitrate applied  Cervix:  normal appearance. Well supported with Valsalva no descent  Uterus:  absent.  Adnexa:  non palpable bilaterally.  There is somewhat of a cystocele that persists.           Assessment   1. Left upper quadrant pain about 2 weeks postop from a supracervical hysterectomy associated with sacral cervical pexy and anterior posterior repair  2. Urinary incontinence which is obviously bothersome to her this is new and has not responded medication.  She has no evidence of any urethral hypermobility.  I want to rule out a UTI and will get a catheter UA today.  Continue anti-cholinergics and had antibiotic in case of bladder infection.  This may be assessed with some edema associated with her surgery and hopefully will improve with time if this persists we'll need to see urology.  3. CMA had some difficulty with RACHEL catheterization.  No apparent nitrates or leukocytes and we'll send for culture and treat with a few days of Macrobid  4. She reports interrupter abdomen incision was draining.  I peeled the skin Away and glue nothing active.  Replaced with triple antibiotic ointment and Band-Aid.     Plan   1. As above we will allow increase activities and see back in a week or 2 instead of 4 weeks.  2. Given samples of 50 mg of Myrbetriq to take incentive 25 mg    Medications Rx this encounter:  New Medications Ordered This Visit   Medications   • nitrofurantoin, macrocrystal-monohydrate, (MACROBID) 100 MG capsule     Sig: Take 1 capsule by mouth 2 (Two) Times a Day for 3 days. 1 po BID for 1 week     Dispense:  6 capsule     Refill:  0          This note was electronically signed.    Mihai Best MD  July 16, 2018

## 2018-07-17 ENCOUNTER — HOSPITAL ENCOUNTER (OUTPATIENT)
Facility: HOSPITAL | Age: 73
Discharge: HOME | End: 2018-07-17
Payer: MEDICARE

## 2018-07-17 LAB
ALBUMIN SERPL-MCNC: 4.1 G/DL (ref 3.4–4.8)
ANION GAP SERPL CALCULATED.3IONS-SCNC: 15 MMOL/L (ref 3–16)
BILIRUBIN URINE: NEGATIVE
BLOOD, URINE: NEGATIVE
BUN BLDV-MCNC: 16 MG/DL (ref 6–20)
CALCIUM SERPL-MCNC: 9.7 MG/DL (ref 8.5–10.5)
CHLORIDE BLD-SCNC: 101 MMOL/L (ref 98–107)
CLARITY: CLEAR
CO2: 24 MMOL/L (ref 20–30)
COLOR: YELLOW
CREAT SERPL-MCNC: 1.7 MG/DL (ref 0.4–1.2)
EPITHELIAL CELLS, UA: NORMAL /HPF
GFR AFRICAN AMERICAN: 36
GFR NON-AFRICAN AMERICAN: 29
GLUCOSE BLD-MCNC: 142 MG/DL (ref 74–106)
GLUCOSE URINE: NEGATIVE MG/DL
HBA1C MFR BLD: 6.8 %
KETONES, URINE: NEGATIVE MG/DL
LEUKOCYTE ESTERASE, URINE: ABNORMAL
MICROSCOPIC EXAMINATION: YES
NITRITE, URINE: NEGATIVE
PARATHYROID HORMONE INTACT: 44.7 PG/ML (ref 14–72)
PH UA: 6
PHOSPHORUS: 4.7 MG/DL (ref 2.5–4.5)
POTASSIUM SERPL-SCNC: 5.1 MMOL/L (ref 3.4–5.1)
PROTEIN UA: 30 MG/DL
RBC UA: NORMAL /HPF (ref 0–2)
SODIUM BLD-SCNC: 140 MMOL/L (ref 136–145)
SPECIFIC GRAVITY UA: 1.02
URIC ACID, SERUM: 6.8 MG/DL (ref 2.5–7.1)
URINE TYPE: ABNORMAL
UROBILINOGEN, URINE: 0.2 E.U./DL
VITAMIN D 25-HYDROXY: 46.8 (ref 32–100)
WBC UA: NORMAL /HPF (ref 0–5)

## 2018-07-17 PROCEDURE — 80069 RENAL FUNCTION PANEL: CPT

## 2018-07-17 PROCEDURE — 84550 ASSAY OF BLOOD/URIC ACID: CPT

## 2018-07-17 PROCEDURE — 81001 URINALYSIS AUTO W/SCOPE: CPT

## 2018-07-17 PROCEDURE — 82306 VITAMIN D 25 HYDROXY: CPT

## 2018-07-17 PROCEDURE — 83036 HEMOGLOBIN GLYCOSYLATED A1C: CPT

## 2018-07-17 PROCEDURE — 36415 COLL VENOUS BLD VENIPUNCTURE: CPT

## 2018-07-17 PROCEDURE — 83970 ASSAY OF PARATHORMONE: CPT

## 2018-07-18 PROBLEM — B95.2 UTI (URINARY TRACT INFECTION) DUE TO ENTEROCOCCUS: Status: ACTIVE | Noted: 2018-07-18

## 2018-07-18 PROBLEM — N39.0 UTI (URINARY TRACT INFECTION) DUE TO ENTEROCOCCUS: Status: ACTIVE | Noted: 2018-07-18

## 2018-07-18 RX ORDER — NITROFURANTOIN 25; 75 MG/1; MG/1
100 CAPSULE ORAL 2 TIMES DAILY
Qty: 14 CAPSULE | Refills: 0 | Status: SHIPPED | OUTPATIENT
Start: 2018-07-18 | End: 2018-07-25

## 2018-07-18 NOTE — PROGRESS NOTES
Please her know looks like she does have a bladder infection which may be causing her urinary leakage and I will give her another prescription to extend her antibiotic 7-10 days total

## 2018-07-19 LAB — BACTERIA SPEC AEROBE CULT: ABNORMAL

## 2018-07-26 ENCOUNTER — OFFICE VISIT (OUTPATIENT)
Dept: OBSTETRICS AND GYNECOLOGY | Facility: CLINIC | Age: 73
End: 2018-07-26

## 2018-07-26 VITALS
WEIGHT: 158 LBS | SYSTOLIC BLOOD PRESSURE: 122 MMHG | BODY MASS INDEX: 27.12 KG/M2 | RESPIRATION RATE: 16 BRPM | DIASTOLIC BLOOD PRESSURE: 70 MMHG

## 2018-07-26 DIAGNOSIS — S31.40XD: ICD-10-CM

## 2018-07-26 DIAGNOSIS — Z87.898 H/O URINARY INCONTINENCE: Primary | ICD-10-CM

## 2018-07-26 DIAGNOSIS — Z09 SURGERY FOLLOW-UP: ICD-10-CM

## 2018-07-26 LAB
BILIRUB BLD-MCNC: ABNORMAL MG/DL
CLARITY, POC: CLEAR
COLOR UR: YELLOW
GLUCOSE UR STRIP-MCNC: NEGATIVE MG/DL
KETONES UR QL: NEGATIVE
LEUKOCYTE EST, POC: NEGATIVE
NITRITE UR-MCNC: NEGATIVE MG/ML
PH UR: 5 [PH] (ref 5–8)
PROT UR STRIP-MCNC: NEGATIVE MG/DL
RBC # UR STRIP: ABNORMAL /UL
SP GR UR: 1.02 (ref 1–1.03)
UROBILINOGEN UR QL: NORMAL

## 2018-07-26 PROCEDURE — 99024 POSTOP FOLLOW-UP VISIT: CPT | Performed by: OBSTETRICS & GYNECOLOGY

## 2018-07-26 PROCEDURE — 81003 URINALYSIS AUTO W/O SCOPE: CPT | Performed by: OBSTETRICS & GYNECOLOGY

## 2018-07-26 NOTE — PROGRESS NOTES
Subjective   Chief Complaint   Patient presents with   • Post-op     still having trouble with bladder     Janette JESUS Diaz is a 72 y.o. year old  presenting to be seen for her post-operative visit.  She had a da Elysia sacral cervico pexy/subtotal hysterectomy along with anterior posterior repair for complete uterine and vaginal prolapse..  Currently she reports continued problems with urinary incontinence changes a pad every 2-3 hours soaking a thick pad.  No problems of incontinence preoperatively.  This is not stress alone may leak out of the blue.  Can't cough without leaking.  She is on me Myrbetriq and Macrobid twice a day for UTI.  I told her that we would check urine today to be sure that that's cleared up she has 7 more days to take an antibiotic.  Myrbetriq is not helping at all.  Bowels are moving okay no complaints really any significant vaginal discharge.  She does have some bilateral abdominal pain laterally.  No fever    The following portions of the patient's history were reviewed and updated as appropriate:problem list, current medications and allergies    Review of Systems please see above     Objective   /70   Resp 16   Wt 71.7 kg (158 lb)   LMP  (LMP Unknown) Comment: POST-MENOPAUSAL  BMI 27.12 kg/m²     General:  well developed; well nourished  no acute distress  appears stated age   Abdomen: soft, non-tender; no masses  no umbilical or inginual hernias are present  no hepato-splenomegaly  incision is healing, intact, without drainage, erythematous and Periumbilical incision is clean the scab there are some granulation tissue noted minimal erythema cleaned with hydroperoxide and covered with triple antibiotic ointment and Band-Aid the other incisions have some scabbing on them but no drainage.  There is no CVAT   Pelvis: Clinical staff was present for exam  External genitalia:  normal appearance of the external genitalia including Bartholin's and [place]'s glands.  :  urethral  meatus normal; urethral hypermobility is absent. Sterile Q-tip placed after lidocaine with maybe 10° angle change with cough  Vaginal:  atrophic mucosal changes are present; Some sutures still present particularly Vicryl posteriorly  Cervix:  normal appearance. Some sutures just above the anterior aspect of the cervix silver nitrate applied to spotting  Uterus:  absent.  Adnexa:  non palpable bilaterally.  Rectal:  digital rectal exam not performed; anus visually normal appearing.  Cystocele GRADE 1          Assessment   1. Urinary incontinence postop subtotal hysterectomy sacral cervical pexy anterior posterior repair.  This is not stress alone and there is no significant angle change with cough or Valsalva.  Unfortunately some of her cystocele persists although she had very extensive cystocele and a fairly large portion of vaginal mucosa was removed anteriorly.  2. History of UTI we'll check urinalysis today and have her continue her antibiotics.   urine dipstick today is negative for leukocytes and nitrates positive bilirubin and blood.  3. I will change from Myrbetriq to Toviaz 4 mg with 2 weeks' samples and 2 weeks' samples of 8 mg.       Plan   1. Will need to refer to urology for further evaluation.  She may need cystoscopy to rule out potential bladder injury or suture?  Think the likelihood would  be low given the cervical pexy rather than colpopexy.  2. Complete Macrobid  3. Switch to Toviaz as above and I'll try to see the same-day she has appointment with urology.    Medications Rx this encounter:  No orders of the defined types were placed in this encounter.         This note was electronically signed.    Mihai Best MD  July 26, 2018

## 2018-08-10 ENCOUNTER — TELEPHONE (OUTPATIENT)
Dept: OBSTETRICS AND GYNECOLOGY | Facility: CLINIC | Age: 73
End: 2018-08-10

## 2018-08-13 ENCOUNTER — APPOINTMENT (OUTPATIENT)
Dept: GENERAL RADIOLOGY | Facility: HOSPITAL | Age: 73
DRG: 683 | End: 2018-08-13
Payer: MEDICARE

## 2018-08-13 ENCOUNTER — APPOINTMENT (OUTPATIENT)
Dept: CT IMAGING | Facility: HOSPITAL | Age: 73
DRG: 683 | End: 2018-08-13
Payer: MEDICARE

## 2018-08-13 ENCOUNTER — HOSPITAL ENCOUNTER (INPATIENT)
Facility: HOSPITAL | Age: 73
LOS: 3 days | Discharge: HOME | DRG: 683 | End: 2018-08-16
Attending: HOSPITALIST | Admitting: INTERNAL MEDICINE
Payer: MEDICARE

## 2018-08-13 DIAGNOSIS — R50.9 FEVER, UNSPECIFIED FEVER CAUSE: ICD-10-CM

## 2018-08-13 DIAGNOSIS — R40.4 TRANSIENT ALTERATION OF AWARENESS: ICD-10-CM

## 2018-08-13 DIAGNOSIS — N30.00 ACUTE CYSTITIS WITHOUT HEMATURIA: Primary | ICD-10-CM

## 2018-08-13 PROBLEM — N39.0 UTI (URINARY TRACT INFECTION): Status: ACTIVE | Noted: 2018-08-13

## 2018-08-13 LAB
A/G RATIO: 1.4 (ref 0.8–2)
ALBUMIN SERPL-MCNC: 3.9 G/DL (ref 3.4–4.8)
ALP BLD-CCNC: 118 U/L (ref 25–100)
ALT SERPL-CCNC: 27 U/L (ref 4–36)
ANION GAP SERPL CALCULATED.3IONS-SCNC: 14 MMOL/L (ref 3–16)
AST SERPL-CCNC: 32 U/L (ref 8–33)
BACTERIA: ABNORMAL /HPF
BASOPHILS ABSOLUTE: 0 K/UL (ref 0–0.1)
BASOPHILS RELATIVE PERCENT: 0.4 %
BILIRUB SERPL-MCNC: 0.3 MG/DL (ref 0.3–1.2)
BILIRUBIN URINE: NEGATIVE
BLOOD, URINE: ABNORMAL
BUN BLDV-MCNC: 29 MG/DL (ref 6–20)
CALCIUM SERPL-MCNC: 9.3 MG/DL (ref 8.5–10.5)
CHLORIDE BLD-SCNC: 97 MMOL/L (ref 98–107)
CLARITY: ABNORMAL
CO2: 22 MMOL/L (ref 20–30)
COLOR: YELLOW
CREAT SERPL-MCNC: 1.9 MG/DL (ref 0.4–1.2)
EOSINOPHILS ABSOLUTE: 0 K/UL (ref 0–0.4)
EOSINOPHILS RELATIVE PERCENT: 0.2 %
EPITHELIAL CELLS, UA: ABNORMAL /HPF
GFR AFRICAN AMERICAN: 31
GFR NON-AFRICAN AMERICAN: 26
GLOBULIN: 2.8 G/DL
GLUCOSE BLD-MCNC: 125 MG/DL (ref 74–106)
GLUCOSE BLD-MCNC: 137 MG/DL (ref 74–106)
GLUCOSE BLD-MCNC: 162 MG/DL (ref 74–106)
GLUCOSE URINE: NEGATIVE MG/DL
HCT VFR BLD CALC: 32.1 % (ref 37–47)
HEMOGLOBIN: 10.1 G/DL (ref 11.5–16.5)
IMMATURE GRANULOCYTES #: 0.1 K/UL
IMMATURE GRANULOCYTES %: 0.6 % (ref 0–5)
KETONES, URINE: NEGATIVE MG/DL
LACTIC ACID: 1.8 MMOL/L (ref 0.4–2)
LEUKOCYTE ESTERASE, URINE: ABNORMAL
LIPASE: 19 U/L (ref 5.6–51.3)
LYMPHOCYTES ABSOLUTE: 0.9 K/UL (ref 1.5–4)
LYMPHOCYTES RELATIVE PERCENT: 8.1 %
MCH RBC QN AUTO: 29.6 PG (ref 27–32)
MCHC RBC AUTO-ENTMCNC: 31.5 G/DL (ref 31–35)
MCV RBC AUTO: 94.1 FL (ref 80–100)
MICROSCOPIC EXAMINATION: YES
MONOCYTES ABSOLUTE: 1.2 K/UL (ref 0.2–0.8)
MONOCYTES RELATIVE PERCENT: 10.9 %
NEUTROPHILS ABSOLUTE: 8.5 K/UL (ref 2–7.5)
NEUTROPHILS RELATIVE PERCENT: 79.8 %
NITRITE, URINE: POSITIVE
PDW BLD-RTO: 13.3 % (ref 11–16)
PERFORMED ON: ABNORMAL
PERFORMED ON: ABNORMAL
PH UA: 5.5
PLATELET # BLD: 144 K/UL (ref 150–400)
PMV BLD AUTO: 10.2 FL (ref 6–10)
POTASSIUM SERPL-SCNC: 4.7 MMOL/L (ref 3.4–5.1)
PROTEIN UA: 100 MG/DL
RBC # BLD: 3.41 M/UL (ref 3.8–5.8)
RBC UA: ABNORMAL /HPF (ref 0–2)
SODIUM BLD-SCNC: 133 MMOL/L (ref 136–145)
SPECIFIC GRAVITY UA: 1.01
TOTAL PROTEIN: 6.7 G/DL (ref 6.4–8.3)
URINE REFLEX TO CULTURE: YES
URINE TYPE: ABNORMAL
UROBILINOGEN, URINE: 0.2 E.U./DL
WBC # BLD: 10.6 K/UL (ref 4–11)
WBC UA: >100 /HPF (ref 0–5)

## 2018-08-13 PROCEDURE — 1200000000 HC SEMI PRIVATE

## 2018-08-13 PROCEDURE — 6370000000 HC RX 637 (ALT 250 FOR IP): Performed by: HOSPITALIST

## 2018-08-13 PROCEDURE — 6360000002 HC RX W HCPCS: Performed by: HOSPITALIST

## 2018-08-13 PROCEDURE — 6370000000 HC RX 637 (ALT 250 FOR IP): Performed by: NURSE PRACTITIONER

## 2018-08-13 PROCEDURE — 83690 ASSAY OF LIPASE: CPT

## 2018-08-13 PROCEDURE — 2580000003 HC RX 258: Performed by: NURSE PRACTITIONER

## 2018-08-13 PROCEDURE — 36415 COLL VENOUS BLD VENIPUNCTURE: CPT

## 2018-08-13 PROCEDURE — 74176 CT ABD & PELVIS W/O CONTRAST: CPT

## 2018-08-13 PROCEDURE — 87086 URINE CULTURE/COLONY COUNT: CPT

## 2018-08-13 PROCEDURE — 96375 TX/PRO/DX INJ NEW DRUG ADDON: CPT

## 2018-08-13 PROCEDURE — 6360000002 HC RX W HCPCS: Performed by: NURSE PRACTITIONER

## 2018-08-13 PROCEDURE — 87801 DETECT AGNT MULT DNA AMPLI: CPT

## 2018-08-13 PROCEDURE — 96365 THER/PROPH/DIAG IV INF INIT: CPT

## 2018-08-13 PROCEDURE — 83605 ASSAY OF LACTIC ACID: CPT

## 2018-08-13 PROCEDURE — 81001 URINALYSIS AUTO W/SCOPE: CPT

## 2018-08-13 PROCEDURE — 87186 SC STD MICRODIL/AGAR DIL: CPT

## 2018-08-13 PROCEDURE — 87077 CULTURE AEROBIC IDENTIFY: CPT

## 2018-08-13 PROCEDURE — 99285 EMERGENCY DEPT VISIT HI MDM: CPT

## 2018-08-13 PROCEDURE — 71045 X-RAY EXAM CHEST 1 VIEW: CPT

## 2018-08-13 PROCEDURE — 87040 BLOOD CULTURE FOR BACTERIA: CPT

## 2018-08-13 PROCEDURE — 2580000003 HC RX 258: Performed by: HOSPITALIST

## 2018-08-13 PROCEDURE — 80053 COMPREHEN METABOLIC PANEL: CPT

## 2018-08-13 PROCEDURE — 0T9B70Z DRAINAGE OF BLADDER WITH DRAINAGE DEVICE, VIA NATURAL OR ARTIFICIAL OPENING: ICD-10-PCS | Performed by: INTERNAL MEDICINE

## 2018-08-13 PROCEDURE — 85025 COMPLETE CBC W/AUTO DIFF WBC: CPT

## 2018-08-13 RX ORDER — MONTELUKAST SODIUM 10 MG/1
10 TABLET ORAL NIGHTLY
Status: DISCONTINUED | OUTPATIENT
Start: 2018-08-13 | End: 2018-08-16 | Stop reason: HOSPADM

## 2018-08-13 RX ORDER — FERROUS SULFATE 325(65) MG
325 TABLET ORAL 2 TIMES DAILY
Status: DISCONTINUED | OUTPATIENT
Start: 2018-08-13 | End: 2018-08-16 | Stop reason: HOSPADM

## 2018-08-13 RX ORDER — LEVOFLOXACIN 5 MG/ML
750 INJECTION, SOLUTION INTRAVENOUS
Status: DISCONTINUED | OUTPATIENT
Start: 2018-08-15 | End: 2018-08-16 | Stop reason: HOSPADM

## 2018-08-13 RX ORDER — ALBUTEROL SULFATE 90 UG/1
2 AEROSOL, METERED RESPIRATORY (INHALATION) EVERY 6 HOURS PRN
Status: DISCONTINUED | OUTPATIENT
Start: 2018-08-13 | End: 2018-08-13 | Stop reason: CLARIF

## 2018-08-13 RX ORDER — ACETAMINOPHEN 325 MG/1
650 TABLET ORAL EVERY 4 HOURS PRN
Status: DISCONTINUED | OUTPATIENT
Start: 2018-08-13 | End: 2018-08-16 | Stop reason: HOSPADM

## 2018-08-13 RX ORDER — GABAPENTIN 300 MG/1
300 CAPSULE ORAL 3 TIMES DAILY
COMMUNITY

## 2018-08-13 RX ORDER — ALLOPURINOL 100 MG/1
100 TABLET ORAL DAILY
COMMUNITY

## 2018-08-13 RX ORDER — DEXTROSE MONOHYDRATE 50 MG/ML
100 INJECTION, SOLUTION INTRAVENOUS PRN
Status: DISCONTINUED | OUTPATIENT
Start: 2018-08-13 | End: 2018-08-16 | Stop reason: HOSPADM

## 2018-08-13 RX ORDER — LEVOFLOXACIN 5 MG/ML
250 INJECTION, SOLUTION INTRAVENOUS ONCE
Status: COMPLETED | OUTPATIENT
Start: 2018-08-13 | End: 2018-08-13

## 2018-08-13 RX ORDER — HYDROXYCHLOROQUINE SULFATE 200 MG/1
200 TABLET, FILM COATED ORAL DAILY
COMMUNITY

## 2018-08-13 RX ORDER — GABAPENTIN 300 MG/1
300 CAPSULE ORAL 3 TIMES DAILY
Status: DISCONTINUED | OUTPATIENT
Start: 2018-08-13 | End: 2018-08-16 | Stop reason: HOSPADM

## 2018-08-13 RX ORDER — ONDANSETRON 2 MG/ML
4 INJECTION INTRAMUSCULAR; INTRAVENOUS EVERY 6 HOURS PRN
Status: DISCONTINUED | OUTPATIENT
Start: 2018-08-13 | End: 2018-08-16 | Stop reason: HOSPADM

## 2018-08-13 RX ORDER — LEVOFLOXACIN 5 MG/ML
500 INJECTION, SOLUTION INTRAVENOUS ONCE
Status: COMPLETED | OUTPATIENT
Start: 2018-08-13 | End: 2018-08-13

## 2018-08-13 RX ORDER — ACETAMINOPHEN 325 MG/1
650 TABLET ORAL ONCE
Status: COMPLETED | OUTPATIENT
Start: 2018-08-13 | End: 2018-08-13

## 2018-08-13 RX ORDER — NICOTINE POLACRILEX 4 MG
15 LOZENGE BUCCAL PRN
Status: DISCONTINUED | OUTPATIENT
Start: 2018-08-13 | End: 2018-08-16 | Stop reason: HOSPADM

## 2018-08-13 RX ORDER — FERROUS SULFATE 325(65) MG
325 TABLET ORAL 2 TIMES DAILY
COMMUNITY

## 2018-08-13 RX ORDER — 0.9 % SODIUM CHLORIDE 0.9 %
1000 INTRAVENOUS SOLUTION INTRAVENOUS ONCE
Status: COMPLETED | OUTPATIENT
Start: 2018-08-13 | End: 2018-08-13

## 2018-08-13 RX ORDER — FUROSEMIDE 20 MG/1
20 TABLET ORAL DAILY
Status: DISCONTINUED | OUTPATIENT
Start: 2018-08-13 | End: 2018-08-14

## 2018-08-13 RX ORDER — SODIUM CHLORIDE 0.9 % (FLUSH) 0.9 %
10 SYRINGE (ML) INJECTION EVERY 12 HOURS SCHEDULED
Status: DISCONTINUED | OUTPATIENT
Start: 2018-08-13 | End: 2018-08-16 | Stop reason: HOSPADM

## 2018-08-13 RX ORDER — INSULIN GLARGINE 100 [IU]/ML
30 INJECTION, SOLUTION SUBCUTANEOUS NIGHTLY
Status: DISCONTINUED | OUTPATIENT
Start: 2018-08-13 | End: 2018-08-14

## 2018-08-13 RX ORDER — FESOTERODINE FUMARATE 4 MG/1
4 TABLET, EXTENDED RELEASE ORAL DAILY
COMMUNITY

## 2018-08-13 RX ORDER — ASPIRIN 81 MG/1
81 TABLET, CHEWABLE ORAL DAILY
Status: DISCONTINUED | OUTPATIENT
Start: 2018-08-13 | End: 2018-08-16 | Stop reason: HOSPADM

## 2018-08-13 RX ORDER — ALLOPURINOL 100 MG/1
100 TABLET ORAL DAILY
Status: DISCONTINUED | OUTPATIENT
Start: 2018-08-13 | End: 2018-08-16 | Stop reason: HOSPADM

## 2018-08-13 RX ORDER — DILTIAZEM HYDROCHLORIDE 300 MG/1
300 CAPSULE, COATED, EXTENDED RELEASE ORAL DAILY
Status: DISCONTINUED | OUTPATIENT
Start: 2018-08-13 | End: 2018-08-16 | Stop reason: HOSPADM

## 2018-08-13 RX ORDER — NATEGLINIDE 120 MG/1
120 TABLET ORAL
COMMUNITY

## 2018-08-13 RX ORDER — PRAVASTATIN SODIUM 20 MG
20 TABLET ORAL DAILY
Status: DISCONTINUED | OUTPATIENT
Start: 2018-08-13 | End: 2018-08-16 | Stop reason: HOSPADM

## 2018-08-13 RX ORDER — SODIUM CHLORIDE 0.9 % (FLUSH) 0.9 %
10 SYRINGE (ML) INJECTION PRN
Status: DISCONTINUED | OUTPATIENT
Start: 2018-08-13 | End: 2018-08-16 | Stop reason: HOSPADM

## 2018-08-13 RX ORDER — DOXAZOSIN MESYLATE 4 MG/1
8 TABLET ORAL NIGHTLY
Status: DISCONTINUED | OUTPATIENT
Start: 2018-08-13 | End: 2018-08-16 | Stop reason: HOSPADM

## 2018-08-13 RX ORDER — DEXTROSE MONOHYDRATE 25 G/50ML
12.5 INJECTION, SOLUTION INTRAVENOUS PRN
Status: DISCONTINUED | OUTPATIENT
Start: 2018-08-13 | End: 2018-08-16 | Stop reason: HOSPADM

## 2018-08-13 RX ORDER — IPRATROPIUM BROMIDE AND ALBUTEROL SULFATE 2.5; .5 MG/3ML; MG/3ML
1 SOLUTION RESPIRATORY (INHALATION) EVERY 4 HOURS PRN
Status: DISCONTINUED | OUTPATIENT
Start: 2018-08-13 | End: 2018-08-16 | Stop reason: HOSPADM

## 2018-08-13 RX ORDER — MORPHINE SULFATE 2 MG/ML
2 INJECTION, SOLUTION INTRAMUSCULAR; INTRAVENOUS EVERY 30 MIN PRN
Status: DISCONTINUED | OUTPATIENT
Start: 2018-08-13 | End: 2018-08-13

## 2018-08-13 RX ORDER — SODIUM CHLORIDE 9 MG/ML
INJECTION, SOLUTION INTRAVENOUS CONTINUOUS
Status: ACTIVE | OUTPATIENT
Start: 2018-08-13 | End: 2018-08-14

## 2018-08-13 RX ORDER — AMITRIPTYLINE HYDROCHLORIDE 25 MG/1
50 TABLET, FILM COATED ORAL NIGHTLY
Status: DISCONTINUED | OUTPATIENT
Start: 2018-08-13 | End: 2018-08-16 | Stop reason: HOSPADM

## 2018-08-13 RX ADMIN — Medication 325 MG: at 20:07

## 2018-08-13 RX ADMIN — LEVOFLOXACIN 500 MG: 5 INJECTION, SOLUTION INTRAVENOUS at 15:28

## 2018-08-13 RX ADMIN — INSULIN GLARGINE 30 UNITS: 100 INJECTION, SOLUTION SUBCUTANEOUS at 20:07

## 2018-08-13 RX ADMIN — MONTELUKAST SODIUM 10 MG: 10 TABLET, FILM COATED ORAL at 20:07

## 2018-08-13 RX ADMIN — DOXAZOSIN 8 MG: 4 TABLET ORAL at 20:07

## 2018-08-13 RX ADMIN — ACETAMINOPHEN 650 MG: 325 TABLET, FILM COATED ORAL at 12:28

## 2018-08-13 RX ADMIN — ENOXAPARIN SODIUM 30 MG: 30 INJECTION SUBCUTANEOUS at 17:27

## 2018-08-13 RX ADMIN — DEXTROSE MONOHYDRATE 3.38 G: 5 INJECTION INTRAVENOUS at 13:50

## 2018-08-13 RX ADMIN — PIPERACILLIN AND TAZOBACTAM 2.25 G: 2; .25 INJECTION, POWDER, FOR SOLUTION INTRAVENOUS at 18:46

## 2018-08-13 RX ADMIN — FUROSEMIDE 20 MG: 20 TABLET ORAL at 17:27

## 2018-08-13 RX ADMIN — LEVOFLOXACIN 250 MG: 5 INJECTION, SOLUTION INTRAVENOUS at 16:15

## 2018-08-13 RX ADMIN — GABAPENTIN 300 MG: 300 CAPSULE ORAL at 17:27

## 2018-08-13 RX ADMIN — ACETAMINOPHEN 650 MG: 325 TABLET, FILM COATED ORAL at 17:27

## 2018-08-13 RX ADMIN — SODIUM CHLORIDE: 9 INJECTION, SOLUTION INTRAVENOUS at 17:31

## 2018-08-13 RX ADMIN — MORPHINE SULFATE 2 MG: 2 INJECTION, SOLUTION INTRAMUSCULAR; INTRAVENOUS at 12:27

## 2018-08-13 RX ADMIN — INSULIN LISPRO 1 UNITS: 100 INJECTION, SOLUTION INTRAVENOUS; SUBCUTANEOUS at 17:36

## 2018-08-13 RX ADMIN — AMITRIPTYLINE HYDROCHLORIDE 50 MG: 25 TABLET, FILM COATED ORAL at 20:07

## 2018-08-13 RX ADMIN — PROCHLORPERAZINE EDISYLATE 10 MG: 5 INJECTION INTRAMUSCULAR; INTRAVENOUS at 12:29

## 2018-08-13 RX ADMIN — GABAPENTIN 300 MG: 300 CAPSULE ORAL at 20:07

## 2018-08-13 RX ADMIN — SODIUM CHLORIDE 1000 ML: 9 INJECTION, SOLUTION INTRAVENOUS at 12:27

## 2018-08-13 ASSESSMENT — PAIN DESCRIPTION - FREQUENCY
FREQUENCY: CONTINUOUS
FREQUENCY: CONTINUOUS

## 2018-08-13 ASSESSMENT — PAIN DESCRIPTION - ORIENTATION
ORIENTATION: LOWER
ORIENTATION: LOWER

## 2018-08-13 ASSESSMENT — PAIN DESCRIPTION - LOCATION
LOCATION: ABDOMEN
LOCATION: ABDOMEN

## 2018-08-13 ASSESSMENT — PAIN DESCRIPTION - DESCRIPTORS
DESCRIPTORS: SORE
DESCRIPTORS: SORE

## 2018-08-13 ASSESSMENT — PAIN DESCRIPTION - PAIN TYPE
TYPE: ACUTE PAIN
TYPE: ACUTE PAIN

## 2018-08-13 ASSESSMENT — PAIN SCALES - GENERAL
PAINLEVEL_OUTOF10: 1
PAINLEVEL_OUTOF10: 5
PAINLEVEL_OUTOF10: 0

## 2018-08-13 NOTE — ED NOTES
Pt to be admitted to Room 12. Pt has been advised of copay for private room.       Apryl Motta RN  08/13/18 2553

## 2018-08-13 NOTE — ED PROVIDER NOTES
palpitations  Respiratory:  No shortness of breath, no cough, no wheezing  Gastrointestinal:  + pain-intermittent, + nausea, no vomiting, no diarrhea  Musculoskeletal:  No muscle pain, no joint pain  Skin:  No rash, no easy bruising  Neurologic:  No speech problems, + headache, no extremity numbness, no extremity tingling, no extremity weakness  Psychiatric:  No anxiety  Genitourinary:  No dysuria, no hematuria    Except as noted above the remainder of the review of systems was reviewed and negative. PAST MEDICAL HISTORY     Past Medical History:   Diagnosis Date    Arthritis     Chronic bronchitis ([facility] [address]     Diabetes mellitus ([facility] [address]     Fibromyalgia     Hyperlipidemia     Hypertension     Thyroid disease          SURGICAL HISTORY       Past Surgical History:   Procedure Laterality Date    BLADDER REPAIR      CATARACT REMOVAL Bilateral     CHOLECYSTECTOMY      EYE SURGERY      HYSTERECTOMY      KNEE SURGERY Bilateral          CURRENT MEDICATIONS       Previous Medications    ALBUTEROL (PROAIR HFA) 108 (90 BASE) MCG/ACT INHALER    Inhale 2 puffs into the lungs every 6 hours as needed for Wheezing. ALLOPURINOL (ZYLOPRIM) 100 MG TABLET    Take 100 mg by mouth daily    AMITRIPTYLINE (ELAVIL) 50 MG TABLET    Take 50 mg by mouth nightly. ASPIRIN 81 MG CHEWABLE TABLET    Take 81 mg by mouth daily    DILTIAZEM (CARDIZEM CD) 300 MG ER CAPSULE    Take 300 mg by mouth daily    DOXAZOSIN (CARDURA) 8 MG TABLET    Take 8 mg by mouth nightly     ERGOCALCIFEROL (VITAMIN D2 PO)    Take 500,000 Units by mouth once a week Indications: take on Saturday    FERROUS SULFATE 325 (65 FE) MG TABLET    Take 325 mg by mouth 2 times daily    FESOTERODINE (TOVIAZ) 4 MG TB24 ER TABLET    Take 4 mg by mouth daily    FUROSEMIDE (LASIX) 20 MG TABLET    Take 20 mg by mouth daily    GABAPENTIN (NEURONTIN) 300 MG CAPSULE    Take 300 mg by mouth 3 times daily. Kevin Sotomayor     HYDROXYCHLOROQUINE (PLAQUENIL) 200 MG TABLET    Take 200 mg by mouth daily    INSULIN GLARGINE (LANTUS) 100 UNIT/ML INJECTION VIAL    Inject 30 Units into the skin nightly    IPRATROPIUM-ALBUTEROL (DUONEB) 0.5-2.5 (3) MG/3ML SOLN NEBULIZER SOLUTION    Inhale 3 mLs into the lungs every 4 hours as needed for Shortness of Breath    LEVOTHYROXINE (SYNTHROID) 175 MCG TABLET    Take  by mouth Daily. MONTELUKAST (SINGULAIR) 10 MG TABLET    Take 1 tablet by mouth nightly    NATEGLINIDE (STARLIX) 120 MG TABLET    Take 120 mg by mouth 3 times daily (before meals)    PRAVASTATIN (PRAVACHOL) 20 MG TABLET    Take 20 mg by mouth daily. ALLERGIES     Codeine    FAMILY HISTORY       Family History   Problem Relation Age of Onset    Arthritis Mother     Diabetes Mother     Early Death Mother     Heart Disease Mother     Hearing Loss Father           SOCIAL HISTORY       Social History     Social History    Marital status:      Spouse name: N/A    Number of children: N/A    Years of education: N/A     Social History Main Topics    Smoking status: Never Smoker    Smokeless tobacco: Never Used    Alcohol use No    Drug use: Unknown    Sexual activity: Yes      Comment:      Other Topics Concern    None     Social History Narrative    None         PHYSICAL EXAM    (up to 7 for level 4, 8 or more for level 5)     ED Triage Vitals [08/13/18 1201]   BP Temp Temp Source Pulse Resp SpO2 Height Weight   (!) 144/43 103.4 °F (39.7 °C) Oral 106 16 95 % 5' 4\" (1.626 m) 146 lb (66.2 kg)       Physical Exam  General :Patient is awake, alert, oriented, in no acute distress, nontoxic appearing  HEENT: Pupils are equally round and reactive to light, EOMI, conjunctivae clear, sclerae white, there is no injection no icterus. Cardiac: Heart regular rate, rhythm, no murmurs, rubs, or gallops  Lungs: Lungs are clear to auscultation, there is no wheezing, rhonchi, or rales. There is no use of accessory muscles. Chest wall:  There is no tenderness to palpation over the chest wall or over ribs  Abdomen: Abdomen is soft, nontender, mild distention with tympany to percussion throughout all quadrants. . There is no firm or pulsatile masses, no rebound rigidity or guarding. Decreased bowel sounds all 4 quadrants. Musculoskeletal: 5 out of 5 strength in all 4 extremities. No focal muscle deficits are appreciated  Neuro: Motor intact, sensory intact, level of consciousness is normal, cerebellar function is normal, reflexes are grossly normal.  Dermatology: Skin is warm and dry  Psych: Mentation is grossly normal, cognition is grossly normal. Affect is appropriate. DIAGNOSTIC RESULTS     EKG: All EKG's are interpreted by the Emergency Department Physician who either signs or Co-signs this chart in the absence of a cardiologist.        RADIOLOGY:   Non-plain film images such as CT, Ultrasound and MRI are read by the radiologist. Plain radiographic images are visualized and preliminarily interpreted by the emergency physician with the below findings:      [x] Radiologist's Report Reviewed:  CT ABDOMEN PELVIS WO CONTRAST   Final Result      There is bilateral hydronephrosis and hydroureter. Within the dependent aspect of the bladder posteriorly, there is some thickening noted. This could relate to a hematoma but is nonspecific given the lack of intravenous contrast material. Continued    clinical follow-up is recommended. A short-term CT urogram follow-up is recommended for further assessment. Urology consultation may be indicated. Hepatic steatosis. XR CHEST PORTABLE   Final Result   Impression: No acute cardiopulmonary process. Low respiratory volume resulting in crowding of bronchovascular markings.             ED BEDSIDE ULTRASOUND:   Performed by ED Physician - none    LABS:  Labs Reviewed   CBC WITH AUTO DIFFERENTIAL - Abnormal; Notable for the following:        Result Value    RBC 3.41 (*)     Hemoglobin 10.1 (*)     Hematocrit 32.1 (*)     Platelets 893 (*)     MPV 10.2 (*) patient will require hospitalization for further evaluation workup. Final disposition will be determined once her radiological diagnostic studies be performed and reviewed. Blood work showed white count 10,600, hemoglobin is 10.1, hemoglobin crit 32.1, platelet counts 389. Comprehensive metabolic panel was benign except for sodium 133 slightly low, chloride of 97 slightly low, glucose 125 mildly elevated, BUN of 29 and a creatinine of 1.9 which are both elevated for this patient. I: Phosphatase is 118. Patient's last BUN and creatinine on 7/17/2018 was creatinine 1.7 and a BUN of 16. Lipase was normal at 19. Lactic acid negative at 1.8.    UA nitrite positive, leukoesterase moderate, this did meet criteria for culture. Microscopy showed    Portable chest radiograph read by radiology as no acute cardiopulmonary process. Low respiratory volume resulting in crowding of bronchovascular markings. CT scan abdomen and pelvis without contrast read by radiology as there is bilateral hydronephrosis and hydroureter. Within the dependent aspect of the bladder posteriorly there is some thickening noted. This could relate to a hematoma but is nonspecific given the lack of intravenous contrast material. Continue clinical follow-up is recommended. A short-term CT urogram follow-up is recommended for further assessment. Urology consultation may be indicated. Hepatic steatosis. With her urologist Dr. Lee Valdivia and he wanted her started on oral Levaquin and have a Cronin catheter placed secondary to her retention. Wanted her to follow up with him as an outpatient and they were up her appointment ×4 radiological studies and evaluation by him however he did not feel that she needed to be transferred to their facility for evaluation or admission. Spoke with the patient and her family about this and they are in agreement in which she would prefer to stay here anyway was refusing to be transferred.  I have ordered a dose of Levaquin IV for her and also placement of a Cronin catheter. Spoke with Coty Rob on call hospitalist at this time and he was in agreement to admit the patient for further evaluation workup for acute urinary tract infection. CONSULTS:  None    PROCEDURES:  Procedures    CRITICAL CARE TIME    Total Critical Care time was 0 minutes, excluding separately reportable procedures. There was a high probability of clinically significant/life threatening deterioration in the patient's condition which required my urgent intervention. FINAL IMPRESSION      1. Acute cystitis without hematuria    2. Fever, unspecified fever cause    3. Transient alteration of awareness          DISPOSITION/PLAN   DISPOSITION  : admit      PATIENT REFERRED TO:  No follow-up provider specified. DISCHARGE MEDICATIONS:  New Prescriptions    No medications on file       Comment: Please note this report has been produced using speech recognition software and may contain errors related to that system including errors in grammar, punctuation, and spelling, as well as words and phrases that may be inappropriate. If there are any questions or concerns please feel free to contact the dictating provider for clarification.     Lara Verde DO  Attending Emergency Physician              Lara Verde DO  08/13/18 0211

## 2018-08-14 ENCOUNTER — APPOINTMENT (OUTPATIENT)
Dept: ULTRASOUND IMAGING | Facility: HOSPITAL | Age: 73
DRG: 683 | End: 2018-08-14
Payer: MEDICARE

## 2018-08-14 PROBLEM — R40.4 TRANSIENT ALTERATION OF AWARENESS: Status: ACTIVE | Noted: 2018-08-14

## 2018-08-14 PROBLEM — N13.30 HYDRONEPHROSIS: Status: ACTIVE | Noted: 2018-08-14

## 2018-08-14 PROBLEM — R78.81 BACTEREMIA: Status: ACTIVE | Noted: 2018-08-14

## 2018-08-14 PROBLEM — E87.1 HYPONATREMIA: Status: ACTIVE | Noted: 2018-08-14

## 2018-08-14 LAB
ANION GAP SERPL CALCULATED.3IONS-SCNC: 14 MMOL/L (ref 3–16)
BASOPHILS ABSOLUTE: 0 K/UL (ref 0–0.1)
BASOPHILS RELATIVE PERCENT: 0.3 %
BUN BLDV-MCNC: 25 MG/DL (ref 6–20)
CALCIUM SERPL-MCNC: 8.6 MG/DL (ref 8.5–10.5)
CHLORIDE BLD-SCNC: 97 MMOL/L (ref 98–107)
CO2: 22 MMOL/L (ref 20–30)
CREAT SERPL-MCNC: 1.8 MG/DL (ref 0.4–1.2)
EOSINOPHILS ABSOLUTE: 0.1 K/UL (ref 0–0.4)
EOSINOPHILS RELATIVE PERCENT: 0.9 %
GFR AFRICAN AMERICAN: 33
GFR NON-AFRICAN AMERICAN: 28
GLUCOSE BLD-MCNC: 118 MG/DL (ref 74–106)
GLUCOSE BLD-MCNC: 145 MG/DL (ref 74–106)
GLUCOSE BLD-MCNC: 204 MG/DL (ref 74–106)
GLUCOSE BLD-MCNC: 61 MG/DL (ref 74–106)
GLUCOSE BLD-MCNC: 68 MG/DL (ref 74–106)
GLUCOSE BLD-MCNC: 94 MG/DL (ref 74–106)
HCT VFR BLD CALC: 28.6 % (ref 37–47)
HEMOGLOBIN: 8.8 G/DL (ref 11.5–16.5)
IMMATURE GRANULOCYTES #: 0 K/UL
IMMATURE GRANULOCYTES %: 0.3 % (ref 0–5)
LYMPHOCYTES ABSOLUTE: 1.2 K/UL (ref 1.5–4)
LYMPHOCYTES RELATIVE PERCENT: 13.2 %
MCH RBC QN AUTO: 29.3 PG (ref 27–32)
MCHC RBC AUTO-ENTMCNC: 30.8 G/DL (ref 31–35)
MCV RBC AUTO: 95.3 FL (ref 80–100)
MONOCYTES ABSOLUTE: 1.4 K/UL (ref 0.2–0.8)
MONOCYTES RELATIVE PERCENT: 15.5 %
NEUTROPHILS ABSOLUTE: 6.3 K/UL (ref 2–7.5)
NEUTROPHILS RELATIVE PERCENT: 69.8 %
PDW BLD-RTO: 13.3 % (ref 11–16)
PERFORMED ON: ABNORMAL
PERFORMED ON: NORMAL
PLATELET # BLD: 127 K/UL (ref 150–400)
PMV BLD AUTO: 10.2 FL (ref 6–10)
POTASSIUM REFLEX MAGNESIUM: 4 MMOL/L (ref 3.4–5.1)
RAPID INFLUENZA  B AGN: NEGATIVE
RAPID INFLUENZA A AGN: NEGATIVE
RBC # BLD: 3 M/UL (ref 3.8–5.8)
REPORT: NORMAL
SODIUM BLD-SCNC: 133 MMOL/L (ref 136–145)
WBC # BLD: 9 K/UL (ref 4–11)

## 2018-08-14 PROCEDURE — G8988 SELF CARE GOAL STATUS: HCPCS

## 2018-08-14 PROCEDURE — G8979 MOBILITY GOAL STATUS: HCPCS

## 2018-08-14 PROCEDURE — 6360000002 HC RX W HCPCS: Performed by: NURSE PRACTITIONER

## 2018-08-14 PROCEDURE — 85025 COMPLETE CBC W/AUTO DIFF WBC: CPT

## 2018-08-14 PROCEDURE — 80048 BASIC METABOLIC PNL TOTAL CA: CPT

## 2018-08-14 PROCEDURE — G8987 SELF CARE CURRENT STATUS: HCPCS

## 2018-08-14 PROCEDURE — 87040 BLOOD CULTURE FOR BACTERIA: CPT

## 2018-08-14 PROCEDURE — G8978 MOBILITY CURRENT STATUS: HCPCS

## 2018-08-14 PROCEDURE — 76770 US EXAM ABDO BACK WALL COMP: CPT

## 2018-08-14 PROCEDURE — 2580000003 HC RX 258: Performed by: NURSE PRACTITIONER

## 2018-08-14 PROCEDURE — 97161 PT EVAL LOW COMPLEX 20 MIN: CPT

## 2018-08-14 PROCEDURE — 6370000000 HC RX 637 (ALT 250 FOR IP): Performed by: NURSE PRACTITIONER

## 2018-08-14 PROCEDURE — 99222 1ST HOSP IP/OBS MODERATE 55: CPT | Performed by: INTERNAL MEDICINE

## 2018-08-14 PROCEDURE — 36415 COLL VENOUS BLD VENIPUNCTURE: CPT

## 2018-08-14 PROCEDURE — 1200000000 HC SEMI PRIVATE

## 2018-08-14 PROCEDURE — 97165 OT EVAL LOW COMPLEX 30 MIN: CPT

## 2018-08-14 PROCEDURE — 87804 INFLUENZA ASSAY W/OPTIC: CPT

## 2018-08-14 RX ORDER — INSULIN GLARGINE 100 [IU]/ML
20 INJECTION, SOLUTION SUBCUTANEOUS NIGHTLY
Status: DISCONTINUED | OUTPATIENT
Start: 2018-08-14 | End: 2018-08-16 | Stop reason: HOSPADM

## 2018-08-14 RX ORDER — HYDROCODONE BITARTRATE AND ACETAMINOPHEN 5; 325 MG/1; MG/1
1 TABLET ORAL EVERY 6 HOURS PRN
Status: ACTIVE | OUTPATIENT
Start: 2018-08-14 | End: 2018-08-16

## 2018-08-14 RX ORDER — LACTULOSE 10 G/15ML
20 SOLUTION ORAL ONCE
Status: COMPLETED | OUTPATIENT
Start: 2018-08-14 | End: 2018-08-14

## 2018-08-14 RX ORDER — FAMOTIDINE 10 MG/ML
20 INJECTION, SOLUTION INTRAVENOUS ONCE
Status: CANCELLED | OUTPATIENT
Start: 2018-08-14 | End: 2018-08-14

## 2018-08-14 RX ORDER — SENNA AND DOCUSATE SODIUM 50; 8.6 MG/1; MG/1
2 TABLET, FILM COATED ORAL 2 TIMES DAILY
Status: DISCONTINUED | OUTPATIENT
Start: 2018-08-14 | End: 2018-08-16 | Stop reason: HOSPADM

## 2018-08-14 RX ORDER — SODIUM CHLORIDE 0.9 % (FLUSH) 0.9 %
1-10 SYRINGE (ML) INJECTION AS NEEDED
Status: CANCELLED | OUTPATIENT
Start: 2018-08-14

## 2018-08-14 RX ADMIN — MONTELUKAST SODIUM 10 MG: 10 TABLET, FILM COATED ORAL at 21:01

## 2018-08-14 RX ADMIN — GABAPENTIN 300 MG: 300 CAPSULE ORAL at 15:22

## 2018-08-14 RX ADMIN — LACTULOSE 20 G: 10 SOLUTION ORAL at 16:44

## 2018-08-14 RX ADMIN — ASPIRIN 81 MG 81 MG: 81 TABLET ORAL at 08:54

## 2018-08-14 RX ADMIN — FUROSEMIDE 20 MG: 20 TABLET ORAL at 08:54

## 2018-08-14 RX ADMIN — DOCUSATE SODIUM AND SENNOSIDES 2 TABLET: 8.6; 5 TABLET, FILM COATED ORAL at 16:44

## 2018-08-14 RX ADMIN — DILTIAZEM HYDROCHLORIDE 300 MG: 300 CAPSULE, COATED, EXTENDED RELEASE ORAL at 08:53

## 2018-08-14 RX ADMIN — Medication 325 MG: at 21:01

## 2018-08-14 RX ADMIN — Medication 325 MG: at 08:54

## 2018-08-14 RX ADMIN — SODIUM CHLORIDE: 9 INJECTION, SOLUTION INTRAVENOUS at 04:18

## 2018-08-14 RX ADMIN — ENOXAPARIN SODIUM 30 MG: 30 INJECTION SUBCUTANEOUS at 08:53

## 2018-08-14 RX ADMIN — ALLOPURINOL 100 MG: 100 TABLET ORAL at 08:54

## 2018-08-14 RX ADMIN — Medication 10 ML: at 08:54

## 2018-08-14 RX ADMIN — INSULIN GLARGINE 20 UNITS: 100 INJECTION, SOLUTION SUBCUTANEOUS at 22:25

## 2018-08-14 RX ADMIN — PIPERACILLIN SODIUM AND TAZOBACTAM SODIUM 3.38 G: 3; .375 INJECTION, POWDER, LYOPHILIZED, FOR SOLUTION INTRAVENOUS at 18:17

## 2018-08-14 RX ADMIN — ACETAMINOPHEN 650 MG: 325 TABLET, FILM COATED ORAL at 15:22

## 2018-08-14 RX ADMIN — HYDROCODONE BITARTRATE AND ACETAMINOPHEN 1 TABLET: 5; 325 TABLET ORAL at 16:41

## 2018-08-14 RX ADMIN — ACETAMINOPHEN 650 MG: 325 TABLET, FILM COATED ORAL at 04:18

## 2018-08-14 RX ADMIN — GABAPENTIN 300 MG: 300 CAPSULE ORAL at 21:01

## 2018-08-14 RX ADMIN — DOXAZOSIN 8 MG: 4 TABLET ORAL at 21:01

## 2018-08-14 RX ADMIN — LEVOTHYROXINE SODIUM 175 MCG: 50 TABLET ORAL at 05:32

## 2018-08-14 RX ADMIN — PRAVASTATIN SODIUM 20 MG: 20 TABLET ORAL at 08:54

## 2018-08-14 RX ADMIN — PIPERACILLIN SODIUM AND TAZOBACTAM SODIUM 3.38 G: 3; .375 INJECTION, POWDER, LYOPHILIZED, FOR SOLUTION INTRAVENOUS at 08:53

## 2018-08-14 RX ADMIN — AMITRIPTYLINE HYDROCHLORIDE 50 MG: 25 TABLET, FILM COATED ORAL at 21:01

## 2018-08-14 RX ADMIN — INSULIN LISPRO 2 UNITS: 100 INJECTION, SOLUTION INTRAVENOUS; SUBCUTANEOUS at 12:05

## 2018-08-14 RX ADMIN — GABAPENTIN 300 MG: 300 CAPSULE ORAL at 08:54

## 2018-08-14 RX ADMIN — PIPERACILLIN AND TAZOBACTAM 2.25 G: 2; .25 INJECTION, POWDER, FOR SOLUTION INTRAVENOUS at 02:57

## 2018-08-14 ASSESSMENT — PAIN DESCRIPTION - PAIN TYPE: TYPE: ACUTE PAIN

## 2018-08-14 ASSESSMENT — PAIN SCALES - GENERAL
PAINLEVEL_OUTOF10: 0
PAINLEVEL_OUTOF10: 8
PAINLEVEL_OUTOF10: 8

## 2018-08-14 ASSESSMENT — PAIN DESCRIPTION - LOCATION: LOCATION: GENERALIZED

## 2018-08-14 NOTE — FLOWSHEET NOTE
08/14/18 1522   Vital Signs   Temp 102 °F (38.9 °C)   Temp Source Oral   Pain Assessment   Pain Level (102 f)     Tylenol administered

## 2018-08-14 NOTE — PROGRESS NOTES
Occupational Therapy   Occupational Therapy Initial Assessment  Date: 2018   Patient Name: Neo Wise  MRN: [id number]     : 1945    Date of Service: 2018    Discharge Recommendations:  Home with assist PRN         Patient Diagnosis(es): The primary encounter diagnosis was Acute cystitis without hematuria. Diagnoses of Fever, unspecified fever cause and Transient alteration of awareness were also pertinent to this visit. has a past medical history of Arthritis; Chronic bronchitis ([place] Diabetes mellitus ([facility] [address] Fibromyalgia; Hyperlipidemia; Hypertension; and Thyroid disease. has a past surgical history that includes Cholecystectomy; eye surgery; knee surgery (Bilateral); Cataract removal (Bilateral); Hysterectomy; and bladder repair. Restrictions  Restrictions/Precautions  Restrictions/Precautions: General Precautions, Fall Risk  Required Braces or Orthoses?: No    Subjective   General  Chart Reviewed: Yes  Referring Practitioner: KEYSHA Sebastian  Diagnosis: UTI  Subjective  Subjective: Pt had hysterectomy 3 weeks ago and having difficulty with Urinary incontinence. Pt had no difficulty with UI prior to surgery. Pt having pain with UTI.    Pain Assessment  Patient Currently in Pain: Yes (pain with urgency to urinate. )  Pain Assessment: 0-10  Pain Level: 8  Pain Type: Acute pain  Pain Location: Generalized  Pain Orientation: Lower  Pain Descriptors: Sore  Pain Frequency: Continuous  Pain Intervention(s): Medication (see eMar)  Response to Pain Intervention: Patient Satisfied     Social/Functional History  Social/Functional History  Lives With: Spouse  Type of Home: House  Home Layout: One level  Home Access: Stairs to enter without rails  Entrance Stairs - Number of Steps: 1  Bathroom Shower/Tub: Tub/Shower unit, Shower chair with back  Bathroom Toilet: Standard  Bathroom Equipment: Grab bars in shower, Shower chair  Bathroom Accessibility: Accessible  Home Equipment: impaired, limited or restricted    Goals  Short term goals  Time Frame for Short term goals: 3-5 days  Short term goal 1: Pt to complete bathing with MOD I. Short term goal 2: Pt to improve standing balance to SUP at sink x8 minutes to improve independence with ADLs with LRD. Short term goal 3: Pt to complete dressing with MOD I. Short term goal 4: Pt to tolerate x20 minutes of activity to promote increased activity tolerance. Therapy Time   Individual Concurrent Group Co-treatment   Time In 6958         Time Out 0210         Minutes 20           This note serves as a DC summary in the event of pt discharge.         Katie Gurrola, OTR/L

## 2018-08-14 NOTE — PROGRESS NOTES
Physical Therapy    Facility/Department: [facility] MED SURG  Initial Assessment    NAME: Toño Cameron  : 1945  MRN: [id number]    Date of Service: 2018    Discharge Recommendations:  Continue to assess pending progress        Patient Diagnosis(es): The primary encounter diagnosis was Acute cystitis without hematuria. Diagnoses of Fever, unspecified fever cause and Transient alteration of awareness were also pertinent to this visit. has a past medical history of Arthritis; Chronic bronchitis ([facility] [address] Diabetes mellitus ([facility] [address] Fibromyalgia; Hyperlipidemia; Hypertension; and Thyroid disease. has a past surgical history that includes Cholecystectomy; eye surgery; knee surgery (Bilateral); Cataract removal (Bilateral); Hysterectomy; and bladder repair. Restrictions  Restrictions/Precautions  Restrictions/Precautions: General Precautions, Fall Risk  Required Braces or Orthoses?: No     Vision/Hearing  Vision: Impaired  Vision Exceptions: Wears glasses at all times  Hearing: Within functional limits       Subjective  General  Chart Reviewed: Yes  Patient assessed for rehabilitation services?: Yes  Family / Caregiver Present: Yes  Referring Practitioner: Flavia CHOPRA  Referral Date : 18  Diagnosis: UTI  Follows Commands: Within Functional Limits  Subjective  Subjective: Patient in bed and agreeable to PT assessment. Patient had hysterectomy 3 weeks ago, and has had trouble since. Reports she has been to MD several times - says she is incontinent.    Pain Screening  Patient Currently in Pain: Yes  Vital Signs  Patient Currently in Pain: Yes       Orientation  Orientation  Overall Orientation Status: Within Functional Limits    Social/Functional History  Social/Functional History  Lives With: Spouse  Type of Home: House  Home Layout: One level  Home Access: Stairs to enter without rails  Entrance Stairs - Number of Steps: 1  Bathroom Shower/Tub: Tub/Shower unit, Shower chair with back  [place] 4-5x/week  Times per day: Daily  Plan weeks: 1  Current Treatment Recommendations: Strengthening, ROM, Balance Training, Functional Mobility Training, Transfer Training, Gait Training, Endurance Training, Neuromuscular Re-education, Safety Education & Training, Patient/Caregiver Education & Training  Safety Devices  Type of devices: Call light within reach, Left in bed    G-Code  PT G-Codes  Functional Assessment Tool Used: clinical judgement  Functional Limitation: Mobility: Walking and moving around  Mobility: Walking and Moving Around Current Status (): At least 20 percent but less than 40 percent impaired, limited or restricted  Mobility: Walking and Moving Around Goal Status (): At least 1 percent but less than 20 percent impaired, limited or restricted    Goals  Short term goals  Time Frame for Short term goals: 3-5 days  Short term goal 1: Patient will ambulate with Good balance. Short term goal 2: Increase strength to ambulate 300 feet independently with safety present. Short term goal 3: Patient will perform all transfers independently with safety present. Therapy Time   Individual Concurrent Group Co-treatment   Time In 1350         Time Out 1410         Minutes Armani Gonsalez PT       This note serves as D/C summary if patient is discharged prior to next visit.

## 2018-08-14 NOTE — H&P
History and Physical    Patient:  Anibal Limon    CHIEF COMPLAINT:    Fever    HISTORY OF PRESENT ILLNESS:   The patient is a 67 y.o. female with a past medical history significant for diabetes, hypertension, and hypertension who presents with fever admission to chills and nausea for the past couple days. Reports persistent fever despite taking Tylenol at home. Reports feeling tired and weak overall. Patient reports recently undergoing a hysterectomy and repair for complete uterine and vaginal prolapse. Patient follows Dr. Nataliia Lewis. Denies known sick contacts. Denies urinary complaints. Patient was also reportedly mildly confused. States this has resolved. Past Medical History:      Diagnosis Date    Arthritis     [facility] bronchitis ([facility] [address]     Diabetes mellitus ([facility] [address]     Fibromyalgia     Hyperlipidemia     Hypertension     Thyroid disease        Past Surgical History:      Procedure Laterality Date    BLADDER REPAIR      CATARACT REMOVAL Bilateral     CHOLECYSTECTOMY      EYE SURGERY      HYSTERECTOMY      KNEE SURGERY Bilateral        Medications Prior to Admission:    Prior to Admission medications    Medication Sig Start Date End Date Taking? Authorizing Provider   nateglinide (STARLIX) 120 MG tablet Take 120 mg by mouth 3 times daily (before meals)   Yes Historical Provider, MD   gabapentin (NEURONTIN) 300 MG capsule Take 300 mg by mouth 3 times daily. .   Yes Historical Provider, MD   ferrous sulfate 325 (65 Fe) MG tablet Take 325 mg by mouth 2 times daily   Yes Historical Provider, MD   allopurinol (ZYLOPRIM) 100 MG tablet Take 100 mg by mouth daily   Yes Historical Provider, MD   hydroxychloroquine (PLAQUENIL) 200 MG tablet Take 200 mg by mouth daily   Yes Historical Provider, MD   fesoterodine (TOVIAZ) 4 MG TB24 ER tablet Take 4 mg by mouth daily   Yes Historical Provider, MD   insulin glargine (LANTUS) 100 UNIT/ML injection vial Inject 30 Units into the skin nightly 2/29/16  Yes Tricia Dixon KEYSHA Corral   diltiazem (CARDIZEM CD) 300 MG ER capsule Take 300 mg by mouth daily   Yes Historical Provider, MD   furosemide (LASIX) 20 MG tablet Take 20 mg by mouth daily   Yes Historical Provider, MD   Ergocalciferol (VITAMIN D2 PO) Take 500,000 Units by mouth once a week Indications: take on Saturday   Yes Historical Provider, MD   doxazosin (CARDURA) 8 MG tablet Take 8 mg by mouth nightly    Yes Historical Provider, MD   aspirin 81 MG chewable tablet Take 81 mg by mouth daily   Yes Historical Provider, MD   levothyroxine (SYNTHROID) 175 MCG tablet Take  by mouth Daily. Yes Historical Provider, MD   amitriptyline (ELAVIL) 50 MG tablet Take 50 mg by mouth nightly. Yes Historical Provider, MD   pravastatin (PRAVACHOL) 20 MG tablet Take 20 mg by mouth daily. Yes Historical Provider, MD   montelukast (SINGULAIR) 10 MG tablet Take 1 tablet by mouth nightly 2/29/16   KEYSHA Jaimes   ipratropium-albuterol (DUONEB) 0.5-2.5 (3) MG/3ML SOLN nebulizer solution Inhale 3 mLs into the lungs every 4 hours as needed for Shortness of Breath 2/29/16   KEYSHA Jaimes   albuterol (PROAIR HFA) 108 (90 BASE) MCG/ACT inhaler Inhale 2 puffs into the lungs every 6 hours as needed for Wheezing. 12/31/14   Manny Sweeney MD       Allergies:  Codeine    Social History:   TOBACCO:   reports that she has never smoked. She has never used smokeless tobacco.  ETOH:   reports that she does not drink alcohol. Family History:   Family History   Problem Relation Age of Onset    Arthritis Mother     Diabetes Mother     Early Death Mother     Heart Disease Mother     Hearing Loss Father        Review of system  Constitutional:  Positive for fever, chills and fatigue  Eyes:  Denies eye pain or redness  HENT:  Denies nasal congestion or sore throat   Respiratory:  Denies cough or shortness of breath   Cardiovascular:  Denies chest pain or edema   GI:  Positive for nausea.  Denies vomiting or diarrhea  : CT ABDOMEN PELVIS WO CONTRAST   Final Result      There is bilateral hydronephrosis and hydroureter. Within the dependent aspect of the bladder posteriorly, there is some thickening noted. This could relate to a hematoma but is nonspecific given the lack of intravenous contrast material. Continued    clinical follow-up is recommended. A short-term CT urogram follow-up is recommended for further assessment. Urology consultation may be indicated. Hepatic steatosis. XR CHEST PORTABLE   Final Result   Impression: No acute cardiopulmonary process. Low respiratory volume resulting in crowding of bronchovascular markings. Assessment and Plan     Active Hospital Problems    Diagnosis Date Noted    Hyponatremia [E87.1]  Mild. Treat with IV hydration. BMP in a.m. Monitor electrolyte. 08/14/2018    Bacteremia [R78.81]  Please refer to plan for urinary tract infection   08/14/2018    Transient alteration of awareness [R40.4]  Resolved. Likely related to underlying acute UTI/bacteremia. Treat with antibiotics. Monitor. 08/14/2018    UTI (urinary tract infection) [N39.0]  Patient was admitted with fever. CT abdomen and pelvis shows bilateral hydronephrosis and hydroureter. Within the dependent aspect of the bladder posteriorly, there is some thickening noted. Urinalysis indicative of possible UTI and urine culture obtained. Found to have blood cultures positive ×2 for gram-negative yuli. Repeat blood cultures drawn. Treat patient with broad-spectrum antibiotics. ER physician discussed the case with Dr. Dena Noyola initially who recommended barron catheter placement, treat with antibiotics and follow up with him on outpatient basis. CBC and BMP in a.m. Follow cultures. Consult PT/OT for eval.   08/13/2018    Essential hypertension [I10]  Stable. Continue regimen. Monitor closely    02/16/2016    Diabetes mellitus, type II ([facility] [address] [E11.9]  Hemoglobin A1c 6.8 on 7/17/2018.  Positive for episode of

## 2018-08-15 PROBLEM — E53.8 B12 DEFICIENCY: Status: ACTIVE | Noted: 2018-08-15

## 2018-08-15 LAB
ANION GAP SERPL CALCULATED.3IONS-SCNC: 14 MMOL/L (ref 3–16)
BUN BLDV-MCNC: 23 MG/DL (ref 6–20)
CALCIUM SERPL-MCNC: 8.6 MG/DL (ref 8.5–10.5)
CHLORIDE BLD-SCNC: 100 MMOL/L (ref 98–107)
CO2: 21 MMOL/L (ref 20–30)
CREAT SERPL-MCNC: 1.7 MG/DL (ref 0.4–1.2)
FERRITIN: 201.8 NG/ML (ref 22–322)
FOLATE: 16.91 NG/ML
GFR AFRICAN AMERICAN: 36
GFR NON-AFRICAN AMERICAN: 29
GLUCOSE BLD-MCNC: 123 MG/DL (ref 74–106)
GLUCOSE BLD-MCNC: 86 MG/DL (ref 74–106)
GLUCOSE BLD-MCNC: 91 MG/DL (ref 74–106)
GLUCOSE BLD-MCNC: 92 MG/DL (ref 74–106)
GLUCOSE BLD-MCNC: 94 MG/DL (ref 74–106)
HCT VFR BLD CALC: 28.5 % (ref 37–47)
HEMOGLOBIN: 8.9 G/DL (ref 11.5–16.5)
IRON SATURATION: 9 % (ref 15–50)
IRON: 20 UG/DL (ref 37–145)
MCH RBC QN AUTO: 29.4 PG (ref 27–32)
MCHC RBC AUTO-ENTMCNC: 31.2 G/DL (ref 31–35)
MCV RBC AUTO: 94.1 FL (ref 80–100)
PDW BLD-RTO: 13.3 % (ref 11–16)
PERFORMED ON: ABNORMAL
PERFORMED ON: NORMAL
PLATELET # BLD: 138 K/UL (ref 150–400)
PMV BLD AUTO: 9.8 FL (ref 6–10)
POTASSIUM SERPL-SCNC: 3.9 MMOL/L (ref 3.4–5.1)
RBC # BLD: 3.03 M/UL (ref 3.8–5.8)
SODIUM BLD-SCNC: 135 MMOL/L (ref 136–145)
TOTAL IRON BINDING CAPACITY: 228 UG/DL (ref 250–450)
VITAMIN B-12: <150 PG/ML (ref 211–911)
WBC # BLD: 8 K/UL (ref 4–11)

## 2018-08-15 PROCEDURE — 6370000000 HC RX 637 (ALT 250 FOR IP): Performed by: NURSE PRACTITIONER

## 2018-08-15 PROCEDURE — 36415 COLL VENOUS BLD VENIPUNCTURE: CPT

## 2018-08-15 PROCEDURE — 82607 VITAMIN B-12: CPT

## 2018-08-15 PROCEDURE — 97110 THERAPEUTIC EXERCISES: CPT

## 2018-08-15 PROCEDURE — 82746 ASSAY OF FOLIC ACID SERUM: CPT

## 2018-08-15 PROCEDURE — 97530 THERAPEUTIC ACTIVITIES: CPT

## 2018-08-15 PROCEDURE — 6360000002 HC RX W HCPCS: Performed by: NURSE PRACTITIONER

## 2018-08-15 PROCEDURE — 82728 ASSAY OF FERRITIN: CPT

## 2018-08-15 PROCEDURE — 2580000003 HC RX 258: Performed by: NURSE PRACTITIONER

## 2018-08-15 PROCEDURE — 83540 ASSAY OF IRON: CPT

## 2018-08-15 PROCEDURE — 1200000000 HC SEMI PRIVATE

## 2018-08-15 PROCEDURE — 85027 COMPLETE CBC AUTOMATED: CPT

## 2018-08-15 PROCEDURE — 87040 BLOOD CULTURE FOR BACTERIA: CPT

## 2018-08-15 PROCEDURE — 83550 IRON BINDING TEST: CPT

## 2018-08-15 PROCEDURE — 99232 SBSQ HOSP IP/OBS MODERATE 35: CPT | Performed by: INTERNAL MEDICINE

## 2018-08-15 PROCEDURE — 80048 BASIC METABOLIC PNL TOTAL CA: CPT

## 2018-08-15 RX ORDER — CYANOCOBALAMIN 1000 UG/ML
1000 INJECTION INTRAMUSCULAR; SUBCUTANEOUS ONCE
Status: DISCONTINUED | OUTPATIENT
Start: 2018-08-16 | End: 2018-08-16

## 2018-08-15 RX ORDER — CYANOCOBALAMIN 1000 UG/ML
1000 INJECTION INTRAMUSCULAR; SUBCUTANEOUS ONCE
Status: COMPLETED | OUTPATIENT
Start: 2018-08-15 | End: 2018-08-15

## 2018-08-15 RX ORDER — LACTOBACILLUS RHAMNOSUS GG 10B CELL
1 CAPSULE ORAL 2 TIMES DAILY
Status: DISCONTINUED | OUTPATIENT
Start: 2018-08-15 | End: 2018-08-16 | Stop reason: HOSPADM

## 2018-08-15 RX ADMIN — GABAPENTIN 300 MG: 300 CAPSULE ORAL at 14:37

## 2018-08-15 RX ADMIN — Medication 1 CAPSULE: at 21:26

## 2018-08-15 RX ADMIN — DILTIAZEM HYDROCHLORIDE 300 MG: 300 CAPSULE, COATED, EXTENDED RELEASE ORAL at 09:06

## 2018-08-15 RX ADMIN — LEVOFLOXACIN 750 MG: 5 INJECTION, SOLUTION INTRAVENOUS at 01:14

## 2018-08-15 RX ADMIN — DOXAZOSIN 8 MG: 4 TABLET ORAL at 21:26

## 2018-08-15 RX ADMIN — PIPERACILLIN SODIUM AND TAZOBACTAM SODIUM 3.38 G: 3; .375 INJECTION, POWDER, LYOPHILIZED, FOR SOLUTION INTRAVENOUS at 05:07

## 2018-08-15 RX ADMIN — GABAPENTIN 300 MG: 300 CAPSULE ORAL at 09:06

## 2018-08-15 RX ADMIN — Medication 1 CAPSULE: at 14:37

## 2018-08-15 RX ADMIN — HYDROCODONE BITARTRATE AND ACETAMINOPHEN 1 TABLET: 5; 325 TABLET ORAL at 17:14

## 2018-08-15 RX ADMIN — PRAVASTATIN SODIUM 20 MG: 20 TABLET ORAL at 09:06

## 2018-08-15 RX ADMIN — LEVOTHYROXINE SODIUM 175 MCG: 50 TABLET ORAL at 05:07

## 2018-08-15 RX ADMIN — Medication 325 MG: at 09:06

## 2018-08-15 RX ADMIN — CYANOCOBALAMIN 1000 MCG: 1000 INJECTION, SOLUTION INTRAMUSCULAR at 21:28

## 2018-08-15 RX ADMIN — IRON SUCROSE 200 MG: 20 INJECTION, SOLUTION INTRAVENOUS at 14:38

## 2018-08-15 RX ADMIN — MONTELUKAST SODIUM 10 MG: 10 TABLET, FILM COATED ORAL at 21:27

## 2018-08-15 RX ADMIN — ALLOPURINOL 100 MG: 100 TABLET ORAL at 09:07

## 2018-08-15 RX ADMIN — DOCUSATE SODIUM AND SENNOSIDES 2 TABLET: 8.6; 5 TABLET, FILM COATED ORAL at 21:27

## 2018-08-15 RX ADMIN — GABAPENTIN 300 MG: 300 CAPSULE ORAL at 21:27

## 2018-08-15 RX ADMIN — HYDROCODONE BITARTRATE AND ACETAMINOPHEN 1 TABLET: 5; 325 TABLET ORAL at 07:15

## 2018-08-15 RX ADMIN — ASPIRIN 81 MG 81 MG: 81 TABLET ORAL at 09:07

## 2018-08-15 RX ADMIN — PIPERACILLIN SODIUM AND TAZOBACTAM SODIUM 3.38 G: 3; .375 INJECTION, POWDER, LYOPHILIZED, FOR SOLUTION INTRAVENOUS at 09:48

## 2018-08-15 RX ADMIN — AMITRIPTYLINE HYDROCHLORIDE 50 MG: 25 TABLET, FILM COATED ORAL at 21:27

## 2018-08-15 RX ADMIN — ENOXAPARIN SODIUM 30 MG: 30 INJECTION SUBCUTANEOUS at 09:05

## 2018-08-15 RX ADMIN — PIPERACILLIN SODIUM AND TAZOBACTAM SODIUM 3.38 G: 3; .375 INJECTION, POWDER, LYOPHILIZED, FOR SOLUTION INTRAVENOUS at 17:14

## 2018-08-15 RX ADMIN — Medication 325 MG: at 21:27

## 2018-08-15 ASSESSMENT — PAIN SCALES - GENERAL
PAINLEVEL_OUTOF10: 7
PAINLEVEL_OUTOF10: 8

## 2018-08-15 NOTE — PROGRESS NOTES
Progress Note      Subjective:   Chief complaint: fever    Interval History: No acute events reported. Patient reports feeling overall better today. Energy level has improved. Reportedly tolerating oral intake. Reports having bowel movement. Confusion has resolved and no repeat occurrences have been noted. Review of systems:   Constitutional:  Denies fever or chills. Positive for fatigue   Eyes:  Denies eye pain or redness  HENT:  Denies nasal congestion or sore throat   Respiratory:  Denies cough or shortness of breath   Cardiovascular:  Denies chest pain or palpitations  GI:  Denies abdominal pain, nausea, vomiting or diarrhea  :  Denies dysuria or frequency  Musculoskeletal:  Denies acute neck pain or body aches  Integument:  Denies rash or itching  Neurologic:  Denies headache or dizziness  Psychiatric:  Denies acute depression or acute anxiety      Past medical history, surgical history, family history and social history reviewed and unchanged compared to H&P earlier this admission.     Medications:   Scheduled Meds:   cyanocobalamin  1,000 mcg Intramuscular Once    lactobacillus  1 capsule Oral BID    piperacillin-tazobactam  3.375 g Intravenous Q8H    insulin glargine  20 Units Subcutaneous Nightly    sennosides-docusate sodium  2 tablet Oral BID    amitriptyline  50 mg Oral Nightly    pravastatin  20 mg Oral Daily    diltiazem  300 mg Oral Daily    doxazosin  8 mg Oral Nightly    aspirin  81 mg Oral Daily    montelukast  10 mg Oral Nightly    gabapentin  300 mg Oral TID    ferrous sulfate  325 mg Oral BID    allopurinol  100 mg Oral Daily    levothyroxine  175 mcg Oral Daily    levofloxacin  750 mg Intravenous Q48H    sodium chloride flush  10 mL Intravenous 2 times per day    enoxaparin  30 mg Subcutaneous Daily    insulin lispro  0-6 Units Subcutaneous TID     insulin lispro  0-3 Units Subcutaneous Nightly     Continuous Infusions:   dextrose         Objective:   Vitals: BP (!) 129/55   Pulse 85   Temp 98 °F (36.7 °C) (Oral)   Resp 18   Ht 5' 4\" (1.626 m)   Wt 146 lb (66.2 kg)   SpO2 93%   BMI 25.06 kg/m²   Vital signs reviewed in electronic charts    Physical exam  Constitutional:  Well developed, well nourished, no acute distress  HENT:  Atraumatic, external ears normal, nose normal, oropharynx moist. Neck- supple  Respiratory:  No respiratory distress, no wheezing, rales or rhonchi detected  Cardiovascular:  Normal rate, normal rhythm, no murmurs, no gallops, no rubs, no edema   GI:  Soft, nondistended, normal bowel sounds, nontender, no voluntary guarding  Musculoskeletal:  No cyanosis or obvious acute deformity. Moving all extremities   Integument:  Warm and dry. No rash noted to exposed skin area  Neurologic:  Alert & oriented x 3, no apparent focal deficits noted   Psychiatric:  Speech and behavior appropriate     Results:     Lab Results   Component Value Date    WBC 8.0 08/15/2018    HGB 8.9 (L) 08/15/2018    HCT 28.5 (L) 08/15/2018    MCV 94.1 08/15/2018     (L) 08/15/2018       Lab Results   Component Value Date     08/15/2018    K 3.9 08/15/2018    K 4.0 08/14/2018     08/15/2018    CO2 21 08/15/2018    BUN 23 08/15/2018    CREATININE 1.7 08/15/2018    GLUCOSE 92 08/15/2018    CALCIUM 8.6 08/15/2018     US RENAL COMPLETE   Final Result   IMPRESSION :      Mild right hydronephrosis. Left calyceal distention without overt hydronephrosis. CT ABDOMEN PELVIS WO CONTRAST   Final Result      There is bilateral hydronephrosis and hydroureter. Within the dependent aspect of the bladder posteriorly, there is some thickening noted. This could relate to a hematoma but is nonspecific given the lack of intravenous contrast material. Continued    clinical follow-up is recommended. A short-term CT urogram follow-up is recommended for further assessment. Urology consultation may be indicated. Hepatic steatosis.       XR CHEST PORTABLE   Final Result Impression: No acute cardiopulmonary process. Low respiratory volume resulting in crowding of bronchovascular markings. Assessment and Plan: Active Hospital Problems    Diagnosis Date Noted    B12 deficiency [E53.8]  Positive for significantly decreased B12. Replace with cyanocobalamin injection. 08/15/2018    Hyponatremia [E87.1]  Improved. Monitor electrolytes   08/14/2018    Bacteremia [R78.81]  Refer to plan for UTI   08/14/2018    Transient alteration of awareness [R40.4]  Resolved   08/14/2018    Hydronephrosis [N13.30]  Refer to plan for UTI   08/14/2018    UTI (urinary tract infection) [N39.0]  Clinically improved. WBC within normal limits. Blood cultures positive ×2 for E. coli. Sensitivity data pending. Urine culture shows gram-negative yuli. ID and sensitivity data pending. Follow up blood cultures pending. Renal ultrasound shows mild right hydronephrosis. Left calcyceal distention without overt hydronephrosis. Continue Zosyn and Merrem. Try to discuss the case with patient's urologist again to update him and for recommendations. 08/13/2018    Essential hypertension [I10]  Stable. Continue regimen   02/16/2016    Diabetes mellitus, type II ([facility] [address] [E11.9]  Stable. Continue regimen. Cover with sliding scale insulin protocol if indicated. 05/04/2015    Acute renal failure (ARF) (HCC) [N17.9]  Improved. Monitor. Try to avoid nephrotoxic agents. Check BMP in a.m.   05/04/2015    Anemia [D64.9]  Hemoglobin stable. Iron studies indicative of iron deficiency. Proceed with iron infusion. CBC in a.m. Continue GI regimen. 05/04/2015     Patient was seen and examined by Dr. Kaz Hernandez and plan of care reviewed.       Electronically signed by KEYSHA Bautista on 8/15/2018 at 3:53 PM

## 2018-08-15 NOTE — PROGRESS NOTES
Occupational Therapy  Facility/Department: [facility] MED SURG  Daily Treatment Note  NAME: Celena Pruitt  : 1945  MRN: [id number]    Date of Service: 8/15/2018    Discharge Recommendations:  Home with assist PRN       Patient Diagnosis(es): The primary encounter diagnosis was Acute cystitis without hematuria. Diagnoses of Fever, unspecified fever cause and Transient alteration of awareness were also pertinent to this visit. has a past medical history of Arthritis; Chronic bronchitis ([address] Diabetes mellitus ([address] Fibromyalgia; Hyperlipidemia; Hypertension; and Thyroid disease. has a past surgical history that includes Cholecystectomy; eye surgery; knee surgery (Bilateral); Cataract removal (Bilateral); Hysterectomy; and bladder repair. Restrictions  Restrictions/Precautions  Restrictions/Precautions: General Precautions, Fall Risk  Required Braces or Orthoses?: No  Subjective   General  Chart Reviewed: Yes  Patient assessed for rehabilitation services?: Yes  Referring Practitioner: KEYSHA Lira  Diagnosis: UTI  Subjective  Subjective: Pt had hysterectomy 3 weeks ago and having difficulty with Urinary incontinence. Pt had no difficulty with UI prior to surgery. Pt having pain with UTI. Orientation     Objective             Standing Balance  Sit to stand: Modified independent  Bed mobility  Supine to Sit: Modified independent  Sit to Supine: Modified independent  Scooting: Modified independent  Transfers  Sit to stand: Modified independent      Type of ROM/Therapeutic Exercise  Type of ROM/Therapeutic Exercise: Free weights  Comment: 2#  Exercises  Shoulder Flexion: x15  Shoulder ABduction: x15  Elbow Flexion: x15  Elbow Extension: x15  Supination: x15  Wrist Flexion: x15     Assessment   Assessment: Pt agreeable to OT services. Pt receiving iron infusion and was unable to complete UE ther ex at elbow. Pt completed BUE with 2# weight in BUE.  Pt completed static standing with SBA and

## 2018-08-16 VITALS
OXYGEN SATURATION: 91 % | HEIGHT: 64 IN | TEMPERATURE: 99.2 F | HEART RATE: 59 BPM | BODY MASS INDEX: 24.92 KG/M2 | DIASTOLIC BLOOD PRESSURE: 47 MMHG | RESPIRATION RATE: 16 BRPM | WEIGHT: 146 LBS | SYSTOLIC BLOOD PRESSURE: 115 MMHG

## 2018-08-16 LAB
ANION GAP SERPL CALCULATED.3IONS-SCNC: 15 MMOL/L (ref 3–16)
BLOOD CULTURE, ROUTINE: ABNORMAL
BUN BLDV-MCNC: 21 MG/DL (ref 6–20)
CALCIUM SERPL-MCNC: 8.8 MG/DL (ref 8.5–10.5)
CHLORIDE BLD-SCNC: 102 MMOL/L (ref 98–107)
CO2: 21 MMOL/L (ref 20–30)
CREAT SERPL-MCNC: 1.5 MG/DL (ref 0.4–1.2)
CULTURE, BLOOD 2: ABNORMAL
CULTURE, BLOOD 2: ABNORMAL
GFR AFRICAN AMERICAN: 41
GFR NON-AFRICAN AMERICAN: 34
GLUCOSE BLD-MCNC: 152 MG/DL (ref 74–106)
GLUCOSE BLD-MCNC: 88 MG/DL (ref 74–106)
GLUCOSE BLD-MCNC: 94 MG/DL (ref 74–106)
HCT VFR BLD CALC: 29.4 % (ref 37–47)
HEMOGLOBIN: 9.1 G/DL (ref 11.5–16.5)
MCH RBC QN AUTO: 29 PG (ref 27–32)
MCHC RBC AUTO-ENTMCNC: 31 G/DL (ref 31–35)
MCV RBC AUTO: 93.6 FL (ref 80–100)
ORGANISM: ABNORMAL
PDW BLD-RTO: 13.4 % (ref 11–16)
PERFORMED ON: ABNORMAL
PERFORMED ON: NORMAL
PLATELET # BLD: 156 K/UL (ref 150–400)
PMV BLD AUTO: 9.8 FL (ref 6–10)
POTASSIUM SERPL-SCNC: 3.9 MMOL/L (ref 3.4–5.1)
RBC # BLD: 3.14 M/UL (ref 3.8–5.8)
SODIUM BLD-SCNC: 138 MMOL/L (ref 136–145)
URINE CULTURE, ROUTINE: ABNORMAL
URINE CULTURE, ROUTINE: ABNORMAL
WBC # BLD: 7.4 K/UL (ref 4–11)

## 2018-08-16 PROCEDURE — 85027 COMPLETE CBC AUTOMATED: CPT

## 2018-08-16 PROCEDURE — 2580000003 HC RX 258: Performed by: NURSE PRACTITIONER

## 2018-08-16 PROCEDURE — 97535 SELF CARE MNGMENT TRAINING: CPT

## 2018-08-16 PROCEDURE — 97530 THERAPEUTIC ACTIVITIES: CPT

## 2018-08-16 PROCEDURE — 99238 HOSP IP/OBS DSCHRG MGMT 30/<: CPT | Performed by: INTERNAL MEDICINE

## 2018-08-16 PROCEDURE — 97110 THERAPEUTIC EXERCISES: CPT

## 2018-08-16 PROCEDURE — 6360000002 HC RX W HCPCS: Performed by: NURSE PRACTITIONER

## 2018-08-16 PROCEDURE — 6370000000 HC RX 637 (ALT 250 FOR IP): Performed by: NURSE PRACTITIONER

## 2018-08-16 PROCEDURE — 80048 BASIC METABOLIC PNL TOTAL CA: CPT

## 2018-08-16 PROCEDURE — 36415 COLL VENOUS BLD VENIPUNCTURE: CPT

## 2018-08-16 RX ORDER — CYANOCOBALAMIN 1000 UG/ML
1000 INJECTION INTRAMUSCULAR; SUBCUTANEOUS ONCE
Status: DISCONTINUED | OUTPATIENT
Start: 2018-08-16 | End: 2018-08-16 | Stop reason: ALTCHOICE

## 2018-08-16 RX ORDER — CYANOCOBALAMIN 1000 UG/ML
1000 INJECTION INTRAMUSCULAR; SUBCUTANEOUS ONCE
Status: COMPLETED | OUTPATIENT
Start: 2018-08-16 | End: 2018-08-16

## 2018-08-16 RX ORDER — SYRINGE W-NEEDLE,DISPOSAB,3 ML 25GX5/8"
SYRINGE, EMPTY DISPOSABLE MISCELLANEOUS
Qty: 4 EACH | Refills: 0 | Status: SHIPPED | OUTPATIENT
Start: 2018-08-16

## 2018-08-16 RX ORDER — LACTOBACILLUS RHAMNOSUS GG 10B CELL
1 CAPSULE ORAL 2 TIMES DAILY
Qty: 20 CAPSULE | Refills: 0 | Status: SHIPPED | OUTPATIENT
Start: 2018-08-16

## 2018-08-16 RX ORDER — CYANOCOBALAMIN 1000 UG/ML
INJECTION INTRAMUSCULAR; SUBCUTANEOUS
Qty: 4 ML | Refills: 0 | Status: SHIPPED | OUTPATIENT
Start: 2018-08-16

## 2018-08-16 RX ORDER — LEVOFLOXACIN 750 MG/1
750 TABLET ORAL EVERY OTHER DAY
Qty: 5 TABLET | Refills: 0 | Status: SHIPPED | OUTPATIENT
Start: 2018-08-16

## 2018-08-16 RX ADMIN — Medication 325 MG: at 10:05

## 2018-08-16 RX ADMIN — ASPIRIN 81 MG 81 MG: 81 TABLET ORAL at 10:06

## 2018-08-16 RX ADMIN — INSULIN LISPRO 1 UNITS: 100 INJECTION, SOLUTION INTRAVENOUS; SUBCUTANEOUS at 12:05

## 2018-08-16 RX ADMIN — ALLOPURINOL 100 MG: 100 TABLET ORAL at 10:05

## 2018-08-16 RX ADMIN — PIPERACILLIN SODIUM AND TAZOBACTAM SODIUM 3.38 G: 3; .375 INJECTION, POWDER, LYOPHILIZED, FOR SOLUTION INTRAVENOUS at 02:55

## 2018-08-16 RX ADMIN — GABAPENTIN 300 MG: 300 CAPSULE ORAL at 10:06

## 2018-08-16 RX ADMIN — PRAVASTATIN SODIUM 20 MG: 20 TABLET ORAL at 10:06

## 2018-08-16 RX ADMIN — CYANOCOBALAMIN 1000 MCG: 1000 INJECTION, SOLUTION INTRAMUSCULAR at 16:46

## 2018-08-16 RX ADMIN — ENOXAPARIN SODIUM 30 MG: 30 INJECTION SUBCUTANEOUS at 10:04

## 2018-08-16 RX ADMIN — PIPERACILLIN SODIUM AND TAZOBACTAM SODIUM 3.38 G: 3; .375 INJECTION, POWDER, LYOPHILIZED, FOR SOLUTION INTRAVENOUS at 10:04

## 2018-08-16 RX ADMIN — Medication 1 CAPSULE: at 10:05

## 2018-08-16 RX ADMIN — LEVOTHYROXINE SODIUM 175 MCG: 50 TABLET ORAL at 06:21

## 2018-08-16 RX ADMIN — DILTIAZEM HYDROCHLORIDE 300 MG: 300 CAPSULE, COATED, EXTENDED RELEASE ORAL at 10:05

## 2018-08-16 RX ADMIN — DOCUSATE SODIUM AND SENNOSIDES 2 TABLET: 8.6; 5 TABLET, FILM COATED ORAL at 10:05

## 2018-08-16 RX ADMIN — GABAPENTIN 300 MG: 300 CAPSULE ORAL at 14:18

## 2018-08-16 RX ADMIN — Medication 10 ML: at 10:06

## 2018-08-16 ASSESSMENT — PAIN SCALES - GENERAL: PAINLEVEL_OUTOF10: 0

## 2018-08-16 NOTE — PROGRESS NOTES
Physical Therapy  Facility/Department: [facility] MED SURG  Daily Treatment Note    NAME: Brenden Correia  : 1945  MRN: [id number]    Date of Service: 2018    Discharge Recommendations:  Continue to assess pending progress        Patient Diagnosis(es): The primary encounter diagnosis was Acute cystitis without hematuria. Diagnoses of Fever, unspecified fever cause and Transient alteration of awareness were also pertinent to this visit. has a past medical history of Arthritis; Chronic bronchitis ([facility] [address] Diabetes mellitus ([facility] [address] Fibromyalgia; Hyperlipidemia; Hypertension; and Thyroid disease. has a past surgical history that includes Cholecystectomy; eye surgery; knee surgery (Bilateral); Cataract removal (Bilateral); Hysterectomy; and bladder repair. Restrictions  Restrictions/Precautions  Restrictions/Precautions: General Precautions, Fall Risk  Required Braces or Orthoses?: No     Subjective   General  Chart Reviewed: Yes  Family / Caregiver Present: Yes  Subjective  Subjective: Patient in bed and agreeable to PT treatment. Pain Screening  Patient Currently in Pain: Yes (continued feeling of urgency to urinate )  Vital Signs  Patient Currently in Pain: Yes (continued feeling of urgency to urinate )       Orientation  Orientation  Overall Orientation Status: Within Functional Limits     Objective   Bed mobility  Supine to Sit: Modified independent  Sit to Supine: Modified independent  Scooting: Modified independent     Transfers  Sit to Stand: Independent  Stand to sit:  Independent     Ambulation  Ambulation?: Yes  Ambulation 1  Surface: level tile  Device: No Device  Assistance: Contact guard assistance;Stand by assistance  Quality of Gait: mild unsteady gait - improving balance  Distance: 400 feet     Balance  Sitting - Static: Good  Sitting - Dynamic: Good  Standing - Static: Fair;+  Standing - Dynamic: Fair;+     Exercises  Hip Flexion: sitting x 20 each B  Knee Long Arc Quad: x20 B  Ankle

## 2018-08-16 NOTE — DISCHARGE SUMMARY
Discharge Summary      Patient ID: Melania White      Patient's PCP: Davey Chan    Admit Date: 8/13/2018     Discharge Date:  8/16/2018    Admitting Provider: Amy Noguera MD    Discharging Provider: KEYSHA Johnson     Reason for this admission:   Fever     Discharge Diagnoses: Active Hospital Problems    Diagnosis Date Noted    B12 deficiency [E53.8] 08/15/2018    Hyponatremia [E87.1] 08/14/2018    Bacteremia [R78.81] 08/14/2018    Transient alteration of awareness [R40.4] 08/14/2018    Hydronephrosis [N13.30] 08/14/2018    UTI (urinary tract infection) [N39.0] 08/13/2018    Essential hypertension [I10] 02/16/2016    Diabetes mellitus, type II ([facility] [address] [E11.9] 05/04/2015    Acute renal failure (ARF) ([address] [N17.9] 05/04/2015    Anemia [D64.9] 05/04/2015       Procedures:  US RENAL COMPLETE   Final Result   IMPRESSION :      Mild right hydronephrosis. Left calyceal distention without overt hydronephrosis. CT ABDOMEN PELVIS WO CONTRAST   Final Result      There is bilateral hydronephrosis and hydroureter. Within the dependent aspect of the bladder posteriorly, there is some thickening noted. This could relate to a hematoma but is nonspecific given the lack of intravenous contrast material. Continued    clinical follow-up is recommended. A short-term CT urogram follow-up is recommended for further assessment. Urology consultation may be indicated. Hepatic steatosis. XR CHEST PORTABLE   Final Result   Impression: No acute cardiopulmonary process. Low respiratory volume resulting in crowding of bronchovascular markings. Consults:   PT  OT    Briefly:   The patient is a 78-year-old female who presented with fever, chills and nausea. Hospital Course: Active Hospital Problems    Diagnosis Date Noted    B12 deficiency [E53.8]  Started on replacement for B12 deficiency   08/15/2018    Hyponatremia [E87.1]  Improved. Treated with IV hydration.  Monitored electrolytes. Recommend follow up BMP outpatient. Defer to PCP   08/14/2018    Bacteremia [R78.81]  Patient was admitted with fever. CT abdomen and pelvis shows bilateral hydronephrosis and hydroureter within the dependent aspect of the bladder posteriorly, there is some thickening noted. Urinalysis indicative of possible UTI and urine culture obtained. ER physician discussed the case with Dr. Courtney Loredo initially who recommended barron catheter placement, treat with antibiotics and follow up with him on outpatient basis. Patient admitted to medical unit on broad spectrum antibiotics pending cultures. Barron catheter ordered during admission. Found to have blood cultures positive ×2. Blood cultures were repeated. We did proceed with renal ultrasound that showed mild right hydronephrosis and left calyceal distention without overt hydronephrosis per radiology. Patient is currently feeling much better. Afebrile with normal WBC. The case discussed with Dr. Bethany Parr. He plans to schedule possible outpatient procedure in the near future. Follow up blood cultures are preliminarily negative. Patient will be discharged home in stable condition on oral Levaquin, which is appropriate for patient's underlying UTI/bacteremia according to sensitivity data. 08/14/2018    Transient alteration of awareness [R40.4]  Resolved. Related to acute illness   08/14/2018    Hydronephrosis [N13.30]   08/14/2018    UTI (urinary tract infection) [N39.0]   08/13/2018    Essential hypertension [I10]  Stable   02/16/2016    Diabetes mellitus, type II ([facility] [address] [E11.9   05/04/2015    Acute renal failure (ARF) (HCC) [N17.9]  Improved. IV hydration administered. Tried to avoid nephrotoxic agents   05/04/2015    Anemia [D64.9]  Recommend consideration of further workup up for her anemia if not done previously after acute illness treated.  Iron replacement and B12 replacement will be prescribed   05/04/2015       Disposition: home    Discharged Condition: Stable    Vitals:   Temp: 99.2 °F (37.3 °C)  Pulse: 59  Resp: 16  BP: (!) 115/47  SpO2: 91 %  O2 Device: None (Room air)       Vital signs reviewed in electronic chart    Physical exam  Constitutional:  Well developed, well nourished, no acute distress  HENT:  Atraumatic, external ears normal, nose normal. Neck- supple  Respiratory:  No respiratory distress, no wheezing, rales or rhonchi detected   Cardiovascular:  Normal rate, normal rhythm, no murmurs, no gallops, no rubs   GI:  Soft, nondistended, normal bowel sounds, nontender, no voluntary guarding  Musculoskeletal:  No edema, cyanosis or obvious acute deformity. Moves all extremities  Integument:  Warm and dry. Neurologic:  Alert & oriented x 3, no apparent focal deficits noted   Psychiatric:  Speech and behavior appropriate       Activity: activity as tolerated    Diet: diabetic diet    Follow Up: Primary Care Physician in one week. Dr. Shannon Thomas when scheduled. OB-GYN as scheduled. Recommend follow-up BMP in 1 week. Defer to PCP. Labs:  For convenience and continuity at follow-up the following most recent labs are provided:    CBC:   Lab Results   Component Value Date    WBC 7.4 08/16/2018    HGB 9.1 08/16/2018    HCT 29.4 08/16/2018     08/16/2018       RENAL:   Lab Results   Component Value Date     08/16/2018    K 3.9 08/16/2018    K 4.0 08/14/2018     08/16/2018    CO2 21 08/16/2018    BUN 21 08/16/2018    CREATININE 1.5 08/16/2018         Discharge Medications:     Current Discharge Medication List           Details   lactobacillus (CULTURELLE) CAPS capsule Take 1 capsule by mouth 2 times daily  Qty: 20 capsule, Refills: 0      levofloxacin (LEVAQUIN) 750 MG tablet Take 1 tablet by mouth every other day  Qty: 5 tablet, Refills: 0      cyanocobalamin 1000 MCG/ML injection Inject 1 mL into the muscle every 7 days for 4 weeks, then monthly upon completion  Qty: 4 mL, Refills: 0      Syringe/Needle, Disp, (SYRINGE 3CC/25GX1\") 25G X 1\"

## 2018-08-17 NOTE — PROGRESS NOTES
My name is Kodak Zheng. I am from [facility]. Dr. rOiana Carbone asked that I call to see how you are feeling. Comments:  Having urethral pain that started today and is constant, barron cath is draining yellow no blood noted. Pt is cleaning catheter at home and has no other symptoms. Pt denies fever. Pt states she called her PCP and cannot see her until next week. Recommended that pt be seen if pain continues and informed her that she could go to ER or [facility] during their office hours. Gave pt phone number to [facility]. 1.  Are you having any pain? yes   How are you managing your pain? Tylenol, not helping (i.e meds, heat, ice, elevate)    2. Have you filled your prescriptions? yes    3. We want to ensure you understand your plan of care. Did your discharge instructions answer all of your questions? yes    4. Do you feel you were kept informed during the duration of your stay? yes    5. Have you made a follow-up appointment? yes    CLOSING    6. We always want to make sure you patients receive excellent care. May I ask how your overall care was? It was good    7. What is the one thing you feel we could improve on?  nothing    8. Are there any individuals you would like me to compliment for the care they provide? Aye Chaudhary    Thank you note sent? yes    Further Follow-up?  yes      Contact MD?  no  Contact Nurse Mgr?  no   Other: Instructed pt to go to [facility] or ER if pain continued

## 2018-08-17 NOTE — PROGRESS NOTES
Placed call to patient regarding appointment with family doctor on August 23 at 2:45. She is aware they will be doing her V-A and B12 shot there as well.

## 2018-08-19 LAB
BLOOD CULTURE, ROUTINE: NORMAL
CULTURE, BLOOD 2: NORMAL

## 2018-08-20 LAB — BLOOD CULTURE, ROUTINE: NORMAL

## 2018-08-21 ENCOUNTER — OFFICE VISIT (OUTPATIENT)
Dept: OBSTETRICS AND GYNECOLOGY | Facility: CLINIC | Age: 73
End: 2018-08-21

## 2018-08-21 ENCOUNTER — TELEPHONE (OUTPATIENT)
Dept: OBSTETRICS AND GYNECOLOGY | Facility: CLINIC | Age: 73
End: 2018-08-21

## 2018-08-21 VITALS
RESPIRATION RATE: 16 BRPM | WEIGHT: 155 LBS | BODY MASS INDEX: 26.61 KG/M2 | DIASTOLIC BLOOD PRESSURE: 70 MMHG | SYSTOLIC BLOOD PRESSURE: 120 MMHG

## 2018-08-21 DIAGNOSIS — N10 ACUTE PYELONEPHRITIS: Primary | ICD-10-CM

## 2018-08-21 DIAGNOSIS — B95.2 UTI (URINARY TRACT INFECTION) DUE TO ENTEROCOCCUS: ICD-10-CM

## 2018-08-21 DIAGNOSIS — Z87.898 H/O URINARY INCONTINENCE: ICD-10-CM

## 2018-08-21 DIAGNOSIS — N81.11 CYSTOCELE, MIDLINE: ICD-10-CM

## 2018-08-21 DIAGNOSIS — N39.0 UTI (URINARY TRACT INFECTION) DUE TO ENTEROCOCCUS: ICD-10-CM

## 2018-08-21 PROCEDURE — 99024 POSTOP FOLLOW-UP VISIT: CPT | Performed by: OBSTETRICS & GYNECOLOGY

## 2018-08-21 PROCEDURE — 87086 URINE CULTURE/COLONY COUNT: CPT | Performed by: OBSTETRICS & GYNECOLOGY

## 2018-08-21 RX ORDER — LACTOBACILLUS RHAMNOSUS GG 10B CELL
CAPSULE ORAL DAILY
Status: ON HOLD | COMMUNITY
End: 2018-09-05

## 2018-08-21 RX ORDER — LEVOFLOXACIN 750 MG/1
750 TABLET ORAL DAILY
COMMUNITY
End: 2018-09-26

## 2018-08-21 NOTE — TELEPHONE ENCOUNTER
Provider Name  Dr Best    Reason for Call  Patient has had UTI, patient saw Dr Harden, was told on 8/10/18 that UTI was resolved, patient went to [facility] in [place], [place] on 8/12/18 with temperature of 103.5, hospitalized for 4 days, was told that UTI was in her bloodstream, catheter was placed and has been in for 8 days , patient said she called Dr Harden with no response and does not want to see Dr Harden, wants to know what to do next, patient states she is really sick and in pain, please call her    Call Back Number  [phone]

## 2018-08-21 NOTE — PROGRESS NOTES
Subjective   Chief Complaint   Patient presents with   • Post-op     hospitalized with a UTI last week, has cath in     Janette JESUS Joe is a 72 y.o. year old  presenting to be seen for her post-operative visit.  She had a extensive anterior posterior repair along with subtotal hysterectomy and sacral pexy.  Currently she reports she has seen Dr. Harden on I think the .  He did not do cystoscopy.  Urinalysis did not like that although there was blood there.  He ordered urodynamics.  However when she try to get a hold of his office for about 3 days they did not contact her back she got frustrated and doesn't want to see him or anyone in the office again.  She then last week had pyelonephritis of fever to 104 was placed in the [facility] and given intravenous antibiotics and is currently on Levaquin.  She has a Richard catheter in place right now urine looks clear.  CAT scan reports were reviewed showed possible while her hydronephrosis.  Then a those on the .  On the  renal scan showed mild hydronephrosis on the right nothing on the left side.  I do not recall any hematoma on the examination postoperatively.  She does still have a bit of a cystocele maybe grade 2 even though it was very extensive preoperatively.  I'm afraid one possibility may be that the cystocele was kinking of her bladder to the point where she wasn't having any stress incontinence and now she is however she also leaks just with standing up and without stress.  I would agree with Dr. Harden that a urodynamics study would be necessary and hopefully Joselin Alejo can get that accomplished year Ya test we'll go ahead and send urinalysis from her Richard today but I would like to leave that in place and she was having such an issue with incontinence with that out..    The following portions of the patient's history were reviewed and updated as appropriate:problem list, current medications and allergies    Review of  Systems no complaints of bowel     Objective   /70   Resp 16   Wt 70.3 kg (155 lb)   LMP  (LMP Unknown) Comment: POST-MENOPAUSAL  BMI 26.61 kg/m²     General:  well developed; well nourished  no acute distress  appears stated age  Frustrated with her postoperative course   Abdomen: soft, non-tender; no masses  no umbilical or inginual hernias are present  no hepato-splenomegaly   Pelvis: Clinical staff was present for exam  External genitalia:  normal appearance of the external genitalia including Bartholin's and [place]'s glands.  :  urethral meatus normal; Richard catheter in place  Vaginal:  atrophic mucosal changes are present;  Cervix:  normal appearance. No evidence of any hematoma surrounding the cervix  Uterus:  absent.  Adnexa:  non palpable bilaterally.  Rectal:  digital rectal exam not performed; anus visually normal appearing.  Cystocele GRADE 1-2          Assessment   1. Urinary incontinence status post extensive A&P repair subtotal hysterectomy and sacral pexy  2. Could be element of stress and overflow.  I agree urodynamics or in order  3. May need mid urethral sling type procedure and cystoscopy.  Unfortunately unhappy with local urology group.  I was hoping that may have done cystoscopy to rule out a suture within the bladder.  She mentioned a friend wondered if there was a slit in the bladder.  I don't think there is a whole day as no symptoms of fistula or leakage into her vagina constantly only when she would get up etc.     Plan   1. Completely Levaquin.  We will send urinalysis for culture today  2. Leave the catheter in for now  3. Schedule urodynamics with Joselin Dhillon  4. Possible mid urethral sling cystoscopy at that time to reveal any foreign body i.e. suture as potential cause for her problems.  Ideally may be cystoscopy prior to any other sling type procedure.    Medications Rx this encounter:  No orders of the defined types were placed in this encounter.         This note was  electronically signed.    Mihai Best MD  August 21, 2018

## 2018-08-23 ENCOUNTER — TELEPHONE (OUTPATIENT)
Dept: OBSTETRICS AND GYNECOLOGY | Facility: CLINIC | Age: 73
End: 2018-08-23

## 2018-08-23 NOTE — TELEPHONE ENCOUNTER
I left a voicemail with Janette that the urine culture was negative.  Also asked her if she would be willing to see one of Dr. Harden's  partners for the urodynamic testing because just got here at [facility] can get that done until about September 10?  I've asked her to call me back to see if she be willing to see one Dr. Harden's partners sooner.  Discussed that the Richard catheter has come out about a week earlier than testing.

## 2018-08-24 LAB — BACTERIA SPEC AEROBE CULT: NORMAL

## 2018-08-24 NOTE — TELEPHONE ENCOUNTER
Thanks I put in a call via the [employer] exchange to have Dr. Farmer give me a call on my cell phone

## 2018-08-24 NOTE — TELEPHONE ENCOUNTER
Dr Best Patient    Patient called to let Dr Best know that she would be willing to see Dr Farmer    Call Back Number  [phone]

## 2018-08-24 NOTE — TELEPHONE ENCOUNTER
I will try to get in touch with him and explained her situation hopefully they can do the testing sooner thanks

## 2018-08-28 NOTE — TELEPHONE ENCOUNTER
Ms. Diaz is scheduled with Joselin Dhillon for urodynamics on 9/10/18 at 130pm.  Kaylin from our office has been doing all the talking and scheduling for Ms Diaz.  The last I knew was that Joselin wanted the catheter removed from Ms. Diaz about a week before the urodynamic testing, but I dont know who is removing it.  I will get in touch with Dr. Farmer for you.

## 2018-08-28 NOTE — TELEPHONE ENCOUNTER
She wanted to be seen sooner.  I have spoken with Dr. Farmer today and I think they'll try to get her worked in before 10 September.  Don't cancel that yet.

## 2018-08-28 NOTE — TELEPHONE ENCOUNTER
Please let one to know that I called the exchange Friday the 24th but Dr. Farmer did not get in touch with me over the weekend.  I have left a message with their office voicemail today to try to get them in touch with me to get her scheduled to see Dr. Farmer.  She is Joe seen Dr. Harden.  Unfortunately they had her scheduled for urodynamics and then she canceled because apparently the office didn't return her calls in a timely fashion for she got upset for one reason or the other.  Thanks if you could check with [facility] urology later; their number is [phone]

## 2018-08-30 ENCOUNTER — DOCUMENTATION (OUTPATIENT)
Dept: OBSTETRICS AND GYNECOLOGY | Facility: CLINIC | Age: 73
End: 2018-08-30

## 2018-08-30 DIAGNOSIS — N39.45 CONTINUOUS LEAKAGE OF URINE: Primary | ICD-10-CM

## 2018-08-30 RX ORDER — FESOTERODINE FUMARATE 4 MG/1
4 TABLET, EXTENDED RELEASE ORAL
Qty: 30 TABLET | Status: ON HOLD
Start: 2018-08-30 | End: 2018-09-05

## 2018-08-30 NOTE — TELEPHONE ENCOUNTER
I called [facility] and was told that Ms. Diaz did not have an appt but they would call her to set up one.

## 2018-08-30 NOTE — PROGRESS NOTES
"Janette's  presented to the office today.  Apparently Dr. Farmer did not want to see her since she had seen Dr. Harden recently.  She is seeing Dr. Farmer in the past.  There may have been a mix up with the front office staff as Mr. Riedel reports the  who was dealing with his wife in the past is  no longer with that group.  She has seen Dr. Harden on August 9 and then a few days later was hospitalized at [facility] for sepsis.  More recent urinalysis reveals no infection she's completed her Levaquin but is concerned about infection given continued catheter use.  She reports she leaks whenever she stands up.  She is currently on Toviaz although it is listed as Myrbetriq I gave her samples of Toviaz.  Also gave her  some samples today.  She is scheduled for urodynamics here with Joselin Dhillon on 11 September but I don't think she wants to wait that long nor do I blame her since the catheters been in about 2 weeks already.  We will see if Dr. Shree Gerber , a urologist in [facility] we'll be able and willing to see her.  I discussed she potentially could be a suture in the bladder from her surgery July 2 that could be irritating the bladder ; she had a cervical pexy the with extensive anterior repair.  Unfortunately due to the location of the \"window\" on the computer I could not complete the referral process of last  to do that.  Told them to have Dr. Gerber call me for any questions.  "

## 2018-09-05 ENCOUNTER — HOSPITAL ENCOUNTER (OUTPATIENT)
Facility: HOSPITAL | Age: 73
Setting detail: HOSPITAL OUTPATIENT SURGERY
Discharge: HOME | End: 2018-09-05
Attending: UROLOGY | Admitting: UROLOGY

## 2018-09-05 ENCOUNTER — ANESTHESIA EVENT (OUTPATIENT)
Dept: PERIOP | Facility: HOSPITAL | Age: 73
End: 2018-09-05

## 2018-09-05 ENCOUNTER — ANESTHESIA (OUTPATIENT)
Dept: PERIOP | Facility: HOSPITAL | Age: 73
End: 2018-09-05

## 2018-09-05 ENCOUNTER — APPOINTMENT (OUTPATIENT)
Dept: GENERAL RADIOLOGY | Facility: HOSPITAL | Age: 73
End: 2018-09-05

## 2018-09-05 VITALS
RESPIRATION RATE: 16 BRPM | OXYGEN SATURATION: 94 % | HEART RATE: 81 BPM | WEIGHT: 155 LBS | BODY MASS INDEX: 26.46 KG/M2 | HEIGHT: 64 IN | DIASTOLIC BLOOD PRESSURE: 82 MMHG | TEMPERATURE: 97.8 F | SYSTOLIC BLOOD PRESSURE: 117 MMHG

## 2018-09-05 DIAGNOSIS — Z87.898 H/O URINARY INCONTINENCE: Primary | ICD-10-CM

## 2018-09-05 LAB
ANION GAP SERPL CALCULATED.3IONS-SCNC: 7 MMOL/L (ref 3–11)
BUN BLD-MCNC: 25 MG/DL (ref 9–23)
BUN/CREAT SERPL: 14.7 (ref 7–25)
CALCIUM SPEC-SCNC: 9.5 MG/DL (ref 8.7–10.4)
CHLORIDE SERPL-SCNC: 102 MMOL/L (ref 99–109)
CO2 SERPL-SCNC: 27 MMOL/L (ref 20–31)
CREAT BLD-MCNC: 1.7 MG/DL (ref 0.6–1.3)
DEPRECATED RDW RBC AUTO: 49.4 FL (ref 37–54)
ERYTHROCYTE [DISTWIDTH] IN BLOOD BY AUTOMATED COUNT: 14.5 % (ref 11.3–14.5)
GFR SERPL CREATININE-BSD FRML MDRD: 29 ML/MIN/1.73
GLUCOSE BLD-MCNC: 147 MG/DL (ref 70–100)
HCT VFR BLD AUTO: 33.3 % (ref 34.5–44)
HGB BLD-MCNC: 10.6 G/DL (ref 11.5–15.5)
MCH RBC QN AUTO: 29.3 PG (ref 27–31)
MCHC RBC AUTO-ENTMCNC: 31.8 G/DL (ref 32–36)
MCV RBC AUTO: 92 FL (ref 80–99)
PLATELET # BLD AUTO: 176 10*3/MM3 (ref 150–450)
PMV BLD AUTO: 9.7 FL (ref 6–12)
POTASSIUM BLD-SCNC: 4.6 MMOL/L (ref 3.5–5.5)
RBC # BLD AUTO: 3.62 10*6/MM3 (ref 3.89–5.14)
SODIUM BLD-SCNC: 136 MMOL/L (ref 132–146)
WBC NRBC COR # BLD: 7.25 10*3/MM3 (ref 3.5–10.8)

## 2018-09-05 PROCEDURE — 25010000002 FENTANYL CITRATE (PF) 100 MCG/2ML SOLUTION: Performed by: NURSE ANESTHETIST, CERTIFIED REGISTERED

## 2018-09-05 PROCEDURE — C1758 CATHETER, URETERAL: HCPCS | Performed by: UROLOGY

## 2018-09-05 PROCEDURE — 80048 BASIC METABOLIC PNL TOTAL CA: CPT | Performed by: UROLOGY

## 2018-09-05 PROCEDURE — 25010000002 PROPOFOL 10 MG/ML EMULSION: Performed by: NURSE ANESTHETIST, CERTIFIED REGISTERED

## 2018-09-05 PROCEDURE — 25010000003 CEFAZOLIN IN DEXTROSE 2-4 GM/100ML-% SOLUTION: Performed by: UROLOGY

## 2018-09-05 PROCEDURE — C1769 GUIDE WIRE: HCPCS | Performed by: UROLOGY

## 2018-09-05 PROCEDURE — 74420 UROGRAPHY RTRGR +-KUB: CPT

## 2018-09-05 PROCEDURE — 25010000002 IOPAMIDOL 61 % SOLUTION: Performed by: UROLOGY

## 2018-09-05 PROCEDURE — 85027 COMPLETE CBC AUTOMATED: CPT | Performed by: UROLOGY

## 2018-09-05 PROCEDURE — 25010000002 ONDANSETRON PER 1 MG: Performed by: NURSE ANESTHETIST, CERTIFIED REGISTERED

## 2018-09-05 PROCEDURE — 25010000002 PROPOFOL 1000 MG/ML EMULSION: Performed by: NURSE ANESTHETIST, CERTIFIED REGISTERED

## 2018-09-05 RX ORDER — FAMOTIDINE 20 MG/1
20 TABLET, FILM COATED ORAL ONCE
Status: COMPLETED | OUTPATIENT
Start: 2018-09-05 | End: 2018-09-05

## 2018-09-05 RX ORDER — SODIUM CHLORIDE, SODIUM LACTATE, POTASSIUM CHLORIDE, CALCIUM CHLORIDE 600; 310; 30; 20 MG/100ML; MG/100ML; MG/100ML; MG/100ML
9 INJECTION, SOLUTION INTRAVENOUS CONTINUOUS
Status: DISCONTINUED | OUTPATIENT
Start: 2018-09-05 | End: 2018-09-05 | Stop reason: HOSPADM

## 2018-09-05 RX ORDER — ACETAMINOPHEN 325 MG/1
650 TABLET ORAL ONCE AS NEEDED
Status: DISCONTINUED | OUTPATIENT
Start: 2018-09-05 | End: 2018-09-05 | Stop reason: HOSPADM

## 2018-09-05 RX ORDER — IPRATROPIUM BROMIDE AND ALBUTEROL SULFATE 2.5; .5 MG/3ML; MG/3ML
3 SOLUTION RESPIRATORY (INHALATION) ONCE AS NEEDED
Status: DISCONTINUED | OUTPATIENT
Start: 2018-09-05 | End: 2018-09-05 | Stop reason: HOSPADM

## 2018-09-05 RX ORDER — ONDANSETRON 2 MG/ML
4 INJECTION INTRAMUSCULAR; INTRAVENOUS ONCE AS NEEDED
Status: DISCONTINUED | OUTPATIENT
Start: 2018-09-05 | End: 2018-09-05 | Stop reason: HOSPADM

## 2018-09-05 RX ORDER — CEFAZOLIN SODIUM 2 G/100ML
2 INJECTION, SOLUTION INTRAVENOUS ONCE
Status: COMPLETED | OUTPATIENT
Start: 2018-09-05 | End: 2018-09-05

## 2018-09-05 RX ORDER — PROMETHAZINE HYDROCHLORIDE 25 MG/1
25 SUPPOSITORY RECTAL ONCE AS NEEDED
Status: DISCONTINUED | OUTPATIENT
Start: 2018-09-05 | End: 2018-09-05 | Stop reason: HOSPADM

## 2018-09-05 RX ORDER — PROPOFOL 10 MG/ML
VIAL (ML) INTRAVENOUS AS NEEDED
Status: DISCONTINUED | OUTPATIENT
Start: 2018-09-05 | End: 2018-09-05 | Stop reason: SURG

## 2018-09-05 RX ORDER — PROMETHAZINE HYDROCHLORIDE 25 MG/ML
6.25 INJECTION, SOLUTION INTRAMUSCULAR; INTRAVENOUS ONCE AS NEEDED
Status: DISCONTINUED | OUTPATIENT
Start: 2018-09-05 | End: 2018-09-05 | Stop reason: HOSPADM

## 2018-09-05 RX ORDER — OXYCODONE HYDROCHLORIDE AND ACETAMINOPHEN 5; 325 MG/1; MG/1
1 TABLET ORAL ONCE AS NEEDED
Status: DISCONTINUED | OUTPATIENT
Start: 2018-09-05 | End: 2018-09-05 | Stop reason: HOSPADM

## 2018-09-05 RX ORDER — CEPHALEXIN 250 MG/1
250 CAPSULE ORAL 3 TIMES DAILY
Qty: 15 CAPSULE | Refills: 0 | Status: SHIPPED | OUTPATIENT
Start: 2018-09-05 | End: 2018-09-10

## 2018-09-05 RX ORDER — ACETAMINOPHEN 650 MG/1
650 SUPPOSITORY RECTAL ONCE AS NEEDED
Status: DISCONTINUED | OUTPATIENT
Start: 2018-09-05 | End: 2018-09-05 | Stop reason: HOSPADM

## 2018-09-05 RX ORDER — LIDOCAINE HYDROCHLORIDE 10 MG/ML
INJECTION, SOLUTION EPIDURAL; INFILTRATION; INTRACAUDAL; PERINEURAL AS NEEDED
Status: DISCONTINUED | OUTPATIENT
Start: 2018-09-05 | End: 2018-09-05 | Stop reason: SURG

## 2018-09-05 RX ORDER — ONDANSETRON 2 MG/ML
INJECTION INTRAMUSCULAR; INTRAVENOUS AS NEEDED
Status: DISCONTINUED | OUTPATIENT
Start: 2018-09-05 | End: 2018-09-05 | Stop reason: SURG

## 2018-09-05 RX ORDER — OXYCODONE AND ACETAMINOPHEN 7.5; 325 MG/1; MG/1
2 TABLET ORAL EVERY 4 HOURS PRN
Status: DISCONTINUED | OUTPATIENT
Start: 2018-09-05 | End: 2018-09-05 | Stop reason: HOSPADM

## 2018-09-05 RX ORDER — FENTANYL CITRATE 50 UG/ML
50 INJECTION, SOLUTION INTRAMUSCULAR; INTRAVENOUS
Status: DISCONTINUED | OUTPATIENT
Start: 2018-09-05 | End: 2018-09-05 | Stop reason: HOSPADM

## 2018-09-05 RX ORDER — LIDOCAINE HYDROCHLORIDE 10 MG/ML
0.5 INJECTION, SOLUTION EPIDURAL; INFILTRATION; INTRACAUDAL; PERINEURAL ONCE AS NEEDED
Status: COMPLETED | OUTPATIENT
Start: 2018-09-05 | End: 2018-09-05

## 2018-09-05 RX ORDER — PROMETHAZINE HYDROCHLORIDE 25 MG/1
25 TABLET ORAL ONCE AS NEEDED
Status: DISCONTINUED | OUTPATIENT
Start: 2018-09-05 | End: 2018-09-05 | Stop reason: HOSPADM

## 2018-09-05 RX ORDER — FENTANYL CITRATE 50 UG/ML
INJECTION, SOLUTION INTRAMUSCULAR; INTRAVENOUS AS NEEDED
Status: DISCONTINUED | OUTPATIENT
Start: 2018-09-05 | End: 2018-09-05 | Stop reason: SURG

## 2018-09-05 RX ORDER — MEPERIDINE HYDROCHLORIDE 25 MG/ML
12.5 INJECTION INTRAMUSCULAR; INTRAVENOUS; SUBCUTANEOUS
Status: DISCONTINUED | OUTPATIENT
Start: 2018-09-05 | End: 2018-09-05 | Stop reason: HOSPADM

## 2018-09-05 RX ADMIN — CEFAZOLIN SODIUM 2 G: 2 INJECTION, SOLUTION INTRAVENOUS at 09:40

## 2018-09-05 RX ADMIN — LIDOCAINE HYDROCHLORIDE 50 MG: 10 INJECTION, SOLUTION EPIDURAL; INFILTRATION; INTRACAUDAL; PERINEURAL at 09:45

## 2018-09-05 RX ADMIN — FENTANYL CITRATE 25 MCG: 50 INJECTION, SOLUTION INTRAMUSCULAR; INTRAVENOUS at 10:22

## 2018-09-05 RX ADMIN — PROPOFOL 150 MG: 10 INJECTION, EMULSION INTRAVENOUS at 09:45

## 2018-09-05 RX ADMIN — SODIUM CHLORIDE, POTASSIUM CHLORIDE, SODIUM LACTATE AND CALCIUM CHLORIDE 9 ML/HR: 600; 310; 30; 20 INJECTION, SOLUTION INTRAVENOUS at 08:00

## 2018-09-05 RX ADMIN — LIDOCAINE HYDROCHLORIDE 0.5 ML: 10 INJECTION, SOLUTION EPIDURAL; INFILTRATION; INTRACAUDAL; PERINEURAL at 08:00

## 2018-09-05 RX ADMIN — PROPOFOL 25 MCG/KG/MIN: 10 INJECTION, EMULSION INTRAVENOUS at 09:43

## 2018-09-05 RX ADMIN — FENTANYL CITRATE 50 MCG: 50 INJECTION, SOLUTION INTRAMUSCULAR; INTRAVENOUS at 09:45

## 2018-09-05 RX ADMIN — ONDANSETRON 4 MG: 2 INJECTION INTRAMUSCULAR; INTRAVENOUS at 10:23

## 2018-09-05 RX ADMIN — FENTANYL CITRATE 25 MCG: 50 INJECTION, SOLUTION INTRAMUSCULAR; INTRAVENOUS at 09:55

## 2018-09-05 RX ADMIN — FAMOTIDINE 20 MG: 20 TABLET ORAL at 08:23

## 2018-09-05 NOTE — BRIEF OP NOTE
CYSTOSCOPY RETROGRADE PYELOGRAM  Progress Note    Janette Diaz  9/5/2018    Pre-op Diagnosis:   Urinary incontinence       Post-Op Diagnosis Codes:  same, cystocele    Procedure/CPT® Codes:      Procedure(s):  CYSTOSCOPY  BILATERAL RETROGRADE PYELOGRAMs and urethral dilation    Surgeon(s):  Alex Harden MD    Anesthesia: General    Staff:   Circulator: Jamaica Fierro RN  Radiology Technologist: Sophie Ybarra RT  Scrub Person: Aleida Hyde    Estimated Blood Loss: None    Urine Voided: * No values recorded between 9/5/2018  9:40 AM and 9/5/2018 10:29 AM *    Specimens:                None   Drains:   Urethral Catheter (Active)   Site Assessment Clean;Skin intact 9/5/2018  8:20 AM   Collection Container Leg bag 9/5/2018  8:20 AM   Securement Method Leg strap;Securing device 9/5/2018  8:20 AM       Findings: Edematous trigone from indwelling Richard catheter balloon.  Otherwise normal bladder.  Large midline cystocele     Complications: None, to recovery room stable    Alex Harden MD     Date: 9/5/2018  Time: 10:31 AM

## 2018-09-05 NOTE — OP NOTE
Prep diagnosis: Urinary incontinence  Postoperative diagnosis same, midline cystocele  Procedure performed: cystoscopy with bilateral retrograde pyelograms and urethral dilation  Surgeon: Fina  Anesthesia: Gen.  Indications this is a 72-year-old white female who had a supracervical hysterectomy months ago.  She complains of urinary incontinence since that time.  Had an indwelling Richard catheter for the last month which kept her from having any incontinence.      Operative description:  The patient  was placed on the operative table in the dorsolithotomy position.  Gen. endotracheal anesthesia was administered.  Her groin was prepped and draped usual sterile fashion.  The 21 Welsh A[facility] panendoscope was inserted under video cystoscopy.  The DeLee I noticed a downward deflection of the base of the bladder corresponding to a large midline cystocele.  I go was extremely erythematous and edematous from having an long-term indwelling Richard catheter.  This made visualization of her orifices quite challenging.  The rest the bladder was inspected with the 70° lenses and there were no lesions tumors or other abnormalities.  I was able to locate the orifices using the 70° lens.  An changed out to the 30 and manually reduce the cystocele with Ray-Antoine sponges enabling me to catheterize each of the orifices and performed retrograde pyelograms.  The ureters and calyces and pelves were normal without any filling defects stones or other abnormalities.  These catheters in and removed the cystoscope.  I then dilated her urethra to 28 Welsh to try to improve the emptying.    I think the angle of her cystocele and the urethra causes a balloon and string up of anatomic abnormality that causes  Impingement on her laminar urine flow.    The bladder was drained and scope was removed atraumatically.  2% Xylocaine anesthesia was injected in the bladder.  She was awakened and taken recovery room in stable condition    The hospital dictation  system is broken.  This was done on a voice recognition system.  There may be significant transcription errors.

## 2018-09-05 NOTE — ANESTHESIA PROCEDURE NOTES
Airway  Urgency: elective    Airway not difficult    General Information and Staff    Patient location during procedure: OR  CRNA: ASCENCION PEARSON    Indications and Patient Condition  Indications for airway management: airway protection    Preoxygenated: yes  Mask difficulty assessment: 1 - vent by mask    Final Airway Details  Final airway type: supraglottic airway      Successful airway: I-gel  Size 4    Number of attempts at approach: 1    Additional Comments  LMA placed without difficulty, ventilation with assist, equal breath sounds and symmetric chest rise and fall

## 2018-09-05 NOTE — ANESTHESIA PREPROCEDURE EVALUATION
Anesthesia Evaluation     Patient summary reviewed and Nursing notes reviewed   history of anesthetic complications: PONV  NPO Solid Status: > 8 hours  NPO Liquid Status: > 8 hours           Airway   Mallampati: II  TM distance: >3 FB  Neck ROM: full  No difficulty expected  Dental - normal exam     Pulmonary - negative pulmonary ROS    breath sounds clear to auscultation  Cardiovascular - normal exam    ECG reviewed  Rhythm: regular  Rate: normal    (+) hypertension, hyperlipidemia,   (-) valvular problems/murmurs, dysrhythmias, angina, POLANCO      Neuro/Psych- negative ROS  GI/Hepatic/Renal/Endo    (+)  GERD well controlled,  renal disease (ckd), diabetes mellitus well controlled using insulin, hypothyroidism,   (-) hepatitis, liver disease    Musculoskeletal     (+) arthralgias,       ROS comment: fibromylagia  Abdominal    Substance History      OB/GYN          Other   (+) arthritis       Other Comment: anemia                  Anesthesia Plan    ASA 3     general   (Labs/studies reviewed  Propofol gtt with [facility]  Pt has claustraphobia and does not like mask tightly over face)  intravenous induction   Anesthetic plan, all risks, benefits, and alternatives have been provided, discussed and informed consent has been obtained with: patient.    Plan discussed with CRNA.

## 2018-09-05 NOTE — ANESTHESIA POSTPROCEDURE EVALUATION
Patient: Janette Diaz    Procedure Summary     Date:  09/05/18 Room / Location:   [facility]    Anesthesia Start:  0940 Anesthesia Stop:  1033    Procedure:  CYSTOSCOPY RETROGRADE PYELOGRAM (Bilateral ) Diagnosis:      Surgeon:  Alex Harden MD Provider:  Amanda Claros MD    Anesthesia Type:  general ASA Status:  3          Anesthesia Type: general  Last vitals  BP   102/48 (09/05/18 1030)   Temp   97.5 °F (36.4 °C) (09/05/18 1030)   Pulse   57 (09/05/18 1030)   Resp   16 (09/05/18 1030)     SpO2   94 % (09/05/18 1030)     Post Anesthesia Care and Evaluation    Patient location during evaluation: PACU  Patient participation: waiting for patient participation  Level of consciousness: lethargic  Pain score: 0  Pain management: adequate  Airway patency: patent  Anesthetic complications: No anesthetic complications  PONV Status: none  Cardiovascular status: hemodynamically stable and acceptable  Respiratory status: nonlabored ventilation, acceptable and nasal cannula  Hydration status: acceptable

## 2018-09-05 NOTE — INTERVAL H&P NOTE
"[facility] Pre-op    Full history and physical note from office is up to date.  See office note attached.    /51 (BP Location: Right arm, Patient Position: Lying)   Pulse 58   Temp 96.9 °F (36.1 °C) (Temporal Artery )   Resp 16   Ht 162.6 cm (64\")   Wt 70.3 kg (155 lb)   LMP  (LMP Unknown) Comment: POST-MENOPAUSAL  SpO2 94%   BMI 26.61 kg/m²     Cancer Staging (if applicable)  Cancer Patient: __ yes _X_no __unknown__N/A; If yes, clinical stage T:__ N:__M:__, stage group or __N/A    Assessment:   1. Urinary incontinence  2. Cystocele    Plan:   1. Cystoscopy retrograde pyelogram-bilateral  2. Anesthesia is aware of patient's elevated BP. Will continue to monitor closely.    Leydi Gomez, ARMOND 9/5/2018 8:13 AM    "

## 2018-09-11 ENCOUNTER — OFFICE VISIT (OUTPATIENT)
Dept: OBSTETRICS AND GYNECOLOGY | Facility: CLINIC | Age: 73
End: 2018-09-11

## 2018-09-11 VITALS
DIASTOLIC BLOOD PRESSURE: 70 MMHG | SYSTOLIC BLOOD PRESSURE: 118 MMHG | RESPIRATION RATE: 16 BRPM | WEIGHT: 153 LBS | BODY MASS INDEX: 26.26 KG/M2

## 2018-09-11 DIAGNOSIS — Z09 SURGERY FOLLOW-UP: Primary | ICD-10-CM

## 2018-09-11 PROCEDURE — 99024 POSTOP FOLLOW-UP VISIT: CPT | Performed by: OBSTETRICS & GYNECOLOGY

## 2018-09-11 NOTE — PROGRESS NOTES
Subjective   Chief Complaint   Patient presents with   • Post-op     Janette Diaz is a 72 y.o. year old  presenting to be seen for her post-operative visit.  She had a supracervical hysterectomy sacral pexy and extensive anterior posterior repair.  Currently she reports pain is well controlled and incontinence when she gets up and moves around not as much as with stress cough laughing sneeze she hasn't done much of that.  She is very frustrated that she's had urinary leakage postoperatively after the above surgery.  She recently saw Dr. Harden and he performed a cystoscopy on   and thought that the cystocele needed to be repaired.  Due to the complicated nature of this I'm wondering if I perform further repair of the cystocele he may want to perform a sling type procedure to better elevate and support her urethra.  Is highly likely the cystocele may break down with time as I think it's actually a little bit worse than when I initially saw her postoperatively.    The following portions of the patient's history were reviewed and updated as appropriate:problem list, current medications and allergies    Review of Systems      Objective   /70   Resp 16   Wt 69.4 kg (153 lb)   LMP  (LMP Unknown) Comment: POST-MENOPAUSAL  BMI 26.26 kg/m²     General:  well developed; well nourished  no acute distress  appears stated age   Abdomen: soft, non-tender; no masses  no umbilical or inginual hernias are present  no hepato-splenomegaly   Pelvis: Clinical staff was present for exam  External genitalia:  normal appearance of the external genitalia including Bartholin's and [place]'s glands.  :  urethral meatus normal; urethral hypermobility is absent. Large cystocele is noted  Vaginal:  normal pink mucosa without prolapse or lesions.  Cervix:  normal appearance. Well supported  Uterus:  absent.  Adnexa:  non palpable bilaterally.  Rectal:  digital rectal exam not performed; anus visually normal  appearing.   Sterile Q-tip placed with local no angle change with Valsalva but could not insert very far.  When the cystocele is reduced the Q-tip is inserted further had a more pronounced angle with no real change with Valsalva.            Assessment   1. Incontinence status post supracervical hysterectomy with sacral pexy and extensive anterior posterior repair.  Recent cystoscopy by Dr. Harden.  He suggested cystocele repair.  I spoke with him last week in the hospital I don't think he wanted operate right well though I'm sure she is very frustrated with her continued leakage.  I had reviewed his cystoscopy note.  While there is no significant angle change with Valsalva the angle of insertion of a sterile Q-tip does change whether the cystocele is reduced or not.  She may need urodynamics formally before surgery to decide whether or not a sling procedure is indicated or collagen or some other anti-incontinence procedure as indicated     Plan   1. I will contact Dr. Harden and we will coordinate further therapy.    Medications Rx this encounter:  No orders of the defined types were placed in this encounter.         This note was electronically signed.    Mihai Best MD  September 11, 2018

## 2018-09-13 PROBLEM — N39.0 UTI (URINARY TRACT INFECTION): Status: RESOLVED | Noted: 2018-08-13 | Resolved: 2018-09-13

## 2018-09-14 ENCOUNTER — DOCUMENTATION (OUTPATIENT)
Dept: OBSTETRICS AND GYNECOLOGY | Facility: CLINIC | Age: 73
End: 2018-09-14

## 2018-09-14 NOTE — PROGRESS NOTES
I left a message on her answering machine that Dr. Harden and I will be over 2 operate on Wednesday, October 3 together.  I will get information to our  so that Monday she can work on getting this taken care of in the [place] should hear from her Monday or Tuesday.

## 2018-09-17 ENCOUNTER — PREP FOR SURGERY (OUTPATIENT)
Dept: OTHER | Facility: HOSPITAL | Age: 73
End: 2018-09-17

## 2018-09-17 DIAGNOSIS — N39.3 SUI (STRESS URINARY INCONTINENCE, FEMALE): Primary | ICD-10-CM

## 2018-09-17 DIAGNOSIS — N81.11 MIDLINE CYSTOCELE: ICD-10-CM

## 2018-09-17 RX ORDER — CEFAZOLIN SODIUM 2 G/100ML
2 INJECTION, SOLUTION INTRAVENOUS
Status: CANCELLED | OUTPATIENT
Start: 2018-09-17

## 2018-09-17 RX ORDER — SODIUM CHLORIDE 0.9 % (FLUSH) 0.9 %
1-10 SYRINGE (ML) INJECTION AS NEEDED
Status: CANCELLED | OUTPATIENT
Start: 2018-09-17

## 2018-09-18 PROBLEM — N81.11 MIDLINE CYSTOCELE: Status: ACTIVE | Noted: 2018-09-18

## 2018-09-18 PROBLEM — N39.3 SUI (STRESS URINARY INCONTINENCE, FEMALE): Status: ACTIVE | Noted: 2018-09-18

## 2018-09-26 ENCOUNTER — APPOINTMENT (OUTPATIENT)
Dept: PREADMISSION TESTING | Facility: HOSPITAL | Age: 73
End: 2018-09-26

## 2018-09-26 ENCOUNTER — OFFICE VISIT (OUTPATIENT)
Dept: OBSTETRICS AND GYNECOLOGY | Facility: CLINIC | Age: 73
End: 2018-09-26

## 2018-09-26 VITALS — SYSTOLIC BLOOD PRESSURE: 118 MMHG | WEIGHT: 156 LBS | BODY MASS INDEX: 26.78 KG/M2 | DIASTOLIC BLOOD PRESSURE: 70 MMHG

## 2018-09-26 VITALS — BODY MASS INDEX: 26.69 KG/M2 | HEIGHT: 64 IN | WEIGHT: 156.31 LBS

## 2018-09-26 DIAGNOSIS — N81.11 MIDLINE CYSTOCELE: Primary | ICD-10-CM

## 2018-09-26 DIAGNOSIS — R11.2 POST-OPERATIVE NAUSEA AND VOMITING: ICD-10-CM

## 2018-09-26 DIAGNOSIS — Z98.890 POST-OPERATIVE NAUSEA AND VOMITING: ICD-10-CM

## 2018-09-26 DIAGNOSIS — R30.0 DYSURIA: ICD-10-CM

## 2018-09-26 DIAGNOSIS — R82.71 BACTERIA IN URINE: Primary | ICD-10-CM

## 2018-09-26 DIAGNOSIS — Z87.898 H/O URINARY INCONTINENCE: ICD-10-CM

## 2018-09-26 DIAGNOSIS — Z01.818 PRE-OP EXAM: ICD-10-CM

## 2018-09-26 LAB
BACTERIA UR QL AUTO: ABNORMAL /HPF
BILIRUB UR QL STRIP: NEGATIVE
CLARITY UR: ABNORMAL
COLOR UR: ABNORMAL
DEPRECATED RDW RBC AUTO: 49.1 FL (ref 37–54)
ERYTHROCYTE [DISTWIDTH] IN BLOOD BY AUTOMATED COUNT: 14.6 % (ref 11.3–14.5)
GLUCOSE UR STRIP-MCNC: NEGATIVE MG/DL
HCT VFR BLD AUTO: 35.3 % (ref 34.5–44)
HGB BLD-MCNC: 11.3 G/DL (ref 11.5–15.5)
HGB UR QL STRIP.AUTO: ABNORMAL
HYALINE CASTS UR QL AUTO: ABNORMAL /LPF
KETONES UR QL STRIP: NEGATIVE
LEUKOCYTE ESTERASE UR QL STRIP.AUTO: ABNORMAL
MCH RBC QN AUTO: 29.4 PG (ref 27–31)
MCHC RBC AUTO-ENTMCNC: 32 G/DL (ref 32–36)
MCV RBC AUTO: 91.7 FL (ref 80–99)
NITRITE UR QL STRIP: POSITIVE
PH UR STRIP.AUTO: <=5 [PH] (ref 5–8)
PLATELET # BLD AUTO: 167 10*3/MM3 (ref 150–450)
PMV BLD AUTO: 9.7 FL (ref 6–12)
PROT UR QL STRIP: ABNORMAL
RBC # BLD AUTO: 3.85 10*6/MM3 (ref 3.89–5.14)
RBC # UR: ABNORMAL /HPF
REF LAB TEST METHOD: ABNORMAL
SP GR UR STRIP: 1.01 (ref 1–1.03)
SQUAMOUS #/AREA URNS HPF: ABNORMAL /HPF
UROBILINOGEN UR QL STRIP: ABNORMAL
WBC NRBC COR # BLD: 8.76 10*3/MM3 (ref 3.5–10.8)
WBC UR QL AUTO: ABNORMAL /HPF

## 2018-09-26 PROCEDURE — 81001 URINALYSIS AUTO W/SCOPE: CPT | Performed by: OBSTETRICS & GYNECOLOGY

## 2018-09-26 PROCEDURE — 87077 CULTURE AEROBIC IDENTIFY: CPT | Performed by: OBSTETRICS & GYNECOLOGY

## 2018-09-26 PROCEDURE — 87186 SC STD MICRODIL/AGAR DIL: CPT | Performed by: OBSTETRICS & GYNECOLOGY

## 2018-09-26 PROCEDURE — 85027 COMPLETE CBC AUTOMATED: CPT | Performed by: ANESTHESIOLOGY

## 2018-09-26 PROCEDURE — 36415 COLL VENOUS BLD VENIPUNCTURE: CPT

## 2018-09-26 PROCEDURE — 87086 URINE CULTURE/COLONY COUNT: CPT | Performed by: OBSTETRICS & GYNECOLOGY

## 2018-09-26 PROCEDURE — 99024 POSTOP FOLLOW-UP VISIT: CPT | Performed by: OBSTETRICS & GYNECOLOGY

## 2018-09-26 RX ORDER — NITROFURANTOIN 25; 75 MG/1; MG/1
100 CAPSULE ORAL 2 TIMES DAILY
Qty: 14 CAPSULE | Refills: 0 | Status: SHIPPED | OUTPATIENT
Start: 2018-09-26 | End: 2018-10-04 | Stop reason: HOSPADM

## 2018-09-26 RX ORDER — PHENAZOPYRIDINE HYDROCHLORIDE 95 MG/1
95 TABLET ORAL 3 TIMES DAILY PRN
Start: 2018-09-26 | End: 2020-10-12 | Stop reason: SDUPTHER

## 2018-09-26 RX ORDER — FERROUS SULFATE 325(65) MG
325 TABLET ORAL 2 TIMES DAILY
COMMUNITY
End: 2020-10-12 | Stop reason: SDUPTHER

## 2018-09-26 NOTE — PROGRESS NOTES
Please let her know that I called in Macrobid for her to take twice daily for probable UTI thank you culture is been ordered as well

## 2018-09-26 NOTE — PROGRESS NOTES
Subjective   Janette Diaz is a 72 y.o. year old  who is scheduled  for surgery due to issues with bladder continence.  She had undergone a combined laparoscopic subtotal hysterectomy with sacral pexy along with anterior posterior repair.  Postoperatively she had issues with urinary incontinence.  She also had a change in her urethral orientation which made it difficult to catheterize her for insert a cystoscope.  While the massive cystocele was reduced she still has a cystocele that is interfering potentially with voiding.  She had a history of some UTIs and was wearing a catheter for some time postoperatively.  Recently finished Levaquin.  Dr. Harden and I have discussed her case and plan to proceed with a combination of a revised anterior repair along with a mid urethral sling placement by Dr. Harden to improve her incontinence.  She voices understanding of the planned procedure and outcomes.  She wondered whether she would spend the night and I think that would be a good idea given her past history postoperatively and age.    Past Medical History:   Diagnosis Date   • Bladder prolapse, female, acquired    • Chronic kidney disease    • Diabetes mellitus (CMS/HCC)     TYPE 2. DIAGNOSED 8 YEARS AGO. TESTS BID   • Disease of thyroid gland    • Fibromyalgia    • Hyperlipidemia    • Hypertension    • Osteoarthritis    • PONV (postoperative nausea and vomiting)    • Varicose veins    • Wears glasses      Past Surgical History:   Procedure Laterality Date   • ANTERIOR AND POSTERIOR VAGINAL REPAIR N/A 2018    Procedure: ANTERIOR AND POSTERIOR VAGINAL REPAIR;  Surgeon: Mihai Best MD;  Location: [facility] OR;  Service: Gynecology   • CHOLECYSTECTOMY     • COLONOSCOPY      4-5 YEARS AGO   • CYSTOSCOPY RETROGRADE PYELOGRAM Bilateral 2018    Procedure: CYSTOSCOPY RETROGRADE PYELOGRAM;  Surgeon: Alex Harden MD;  Location: [facility] OR;  Service: Urology   • EYE SURGERY      cataract extraction with lens  implant   • JOINT REPLACEMENT      bilateral knee replacement   • SUPRACERVICAL HYSTERECTOMY SACROCOLPOPEXY N/A 7/2/2018    Procedure: LAPAROSCOPIC SUPRACERVICAL HYSTERECTOMY SACROCOLPOPEXY WITH DAVINCI ROBOT;  Surgeon: Mihai Best MD;  Location: [facility];  Service: DaVinci   • TUBAL ABDOMINAL LIGATION       Social History     Social History   • Marital status:      Social History Main Topics   • Smoking status: Never Smoker   • Smokeless tobacco: Never Used   • Alcohol use Yes      Comment: OCCASIONAL   • Drug use: No   • Sexual activity: Not Currently     Partners: Male     Other Topics Concern   • Not on file       Current Outpatient Prescriptions:   •  allopurinol (ZYLOPRIM) 100 MG tablet, Take 1 tablet by mouth 2 (Two) Times a Day., Disp: , Rfl:   •  amitriptyline (ELAVIL) 50 MG tablet, Take 50 mg by mouth nightly., Disp: , Rfl:   •  aspirin 81 MG chewable tablet, Take 81 mg by mouth daily; patient plans to stop 9-26-18, Disp: , Rfl:   •  conjugated estrogens (PREMARIN) 0.625 MG/GM vaginal cream, Use 0.5 - 2.0 grams intravaginally 1-3 times per week to control symptoms. (Patient taking differently: Insert 0.5 g into the vagina 1 (One) Time Per Week. Use 0.5 - 2.0 grams intravaginally 1-3 times per week to control symptoms; TUESDAYS), Disp: 1 each, Rfl: 12  •  diltiaZEM CD (CARTIA XT) 300 MG 24 hr capsule, Take 1 capsule by mouth Daily., Disp: , Rfl:   •  doxazosin (CARDURA) 8 MG tablet, Take 1 tablet by mouth Daily., Disp: , Rfl:   •  Ergocalciferol (VITAMIN D2 PO), Take 50,000 Units by mouth 1 (One) Time Per Week. SATURDAYS, Disp: , Rfl:   •  furosemide (LASIX) 20 MG tablet, Take 20 mg every other day, Disp: , Rfl:   •  gabapentin (NEURONTIN) 300 MG capsule, Take 300 mg by mouth 3 (Three) Times a Day., Disp: , Rfl:   •  HYDROcodone-acetaminophen (NORCO) 5-325 MG per tablet, Take 1 tablet by mouth Every 8 (Eight) Hours As Needed for Severe Pain ., Disp: 10 tablet, Rfl: 0  •  hydroxychloroquine  (PLAQUENIL) 200 MG tablet, Take 200 mg by mouth Daily., Disp: , Rfl:   •  LANTUS SOLOSTAR 100 UNIT/ML injection pen, INJECT 30 UNITS EVERY Evening, Disp: , Rfl: 0  •  levothyroxine (SYNTHROID, LEVOTHROID) 75 MCG tablet, Take 75 mcg by mouth Daily., Disp: , Rfl:   •  nateglinide (STARLIX) 120 MG tablet, Take 1 tablet by mouth 3 (three) times a day., Disp: , Rfl:   •  omeprazole (PriLOSEC) 20 MG capsule, Take 20 mg by mouth daily., Disp: , Rfl:   •  pravastatin (PRAVACHOL) 20 MG tablet, Take 20 mg by mouth daily., Disp: , Rfl:   •  Cyanocobalamin (VITAMIN B-12 IJ), Inject 1 dose as directed Every 30 (Thirty) Days., Disp: , Rfl:   •  ferrous sulfate 325 (65 FE) MG tablet, Take 325 mg by mouth 2 (Two) Times a Day., Disp: , Rfl:   •  nitrofurantoin, macrocrystal-monohydrate, (MACROBID) 100 MG capsule, Take 1 capsule by mouth 2 (Two) Times a Day for 7 days. 1 po BID for 1 week, Disp: 14 capsule, Rfl: 0  •  phenazopyridine (PYRIDIUM) 95 MG tablet, Take 1 tablet by mouth 3 (Three) Times a Day As Needed for bladder spasms., Disp: , Rfl:     Allergies   Allergen Reactions   • Codeine Rash     intolerate to codeine makes pt vomit must have phenerghan or zofran to take this     Smoking status: Never Smoker                                                              Smokeless tobacco: Never Used                        Review of Systems bowels are normal no fever no vaginal discharge      Objective   /70   Wt 70.8 kg (156 lb)   LMP  (LMP Unknown) Comment: Mammogram 2017 overdue info given   BMI 26.78 kg/m²   General: well developed; well nourished  no acute distress  appears stated age   Heart: regularly irregular rhythm   Lungs: breathing is unlabored  clear to auscultation bilaterally   Abdomen: soft, non-tender; no masses  no umbilical or inginual hernias are present  no hepato-splenomegaly   Pelvis:: Not performed.        Assessment   1. Persistent cystocele and urinary incontinence issues approximately 3 months  status post laparoscopic subtotal hysterectomy with sacral pexy and anterior and posterior repair for grade 4+ cystocele and rectocele.  2. Medical problems as above  3. At her last surgery her sugars only about 99 fasting the morning of surgery so don't think she needs take any medications for diabetes if her sugar is at that level next week.     Plan   1. Preadmission testing today               2.     Discussed nothing by mouth for least and hours preop with exception of taking her hypertensive medications early in the morning with a sip of water                3.     addendum it appears that she has a UTI and I will call in Macrobid since she had recently taken Levaquin.      Mihai Best M.D.  9/26/2018

## 2018-09-27 ENCOUNTER — TELEPHONE (OUTPATIENT)
Dept: OBSTETRICS AND GYNECOLOGY | Facility: CLINIC | Age: 73
End: 2018-09-27

## 2018-09-27 NOTE — TELEPHONE ENCOUNTER
----- Message from Mihai Best MD sent at 9/26/2018  3:21 PM EDT -----  Please let her know that I called in Macrobid for her to take twice daily for probable UTI thank you culture is been ordered as well

## 2018-09-30 LAB — BACTERIA SPEC AEROBE CULT: ABNORMAL

## 2018-10-01 ENCOUNTER — TELEPHONE (OUTPATIENT)
Dept: OBSTETRICS AND GYNECOLOGY | Facility: CLINIC | Age: 73
End: 2018-10-01

## 2018-10-02 ENCOUNTER — ANESTHESIA EVENT (OUTPATIENT)
Dept: PERIOP | Facility: HOSPITAL | Age: 73
End: 2018-10-02

## 2018-10-02 RX ORDER — FAMOTIDINE 10 MG/ML
20 INJECTION, SOLUTION INTRAVENOUS ONCE
Status: CANCELLED | OUTPATIENT
Start: 2018-10-02 | End: 2018-10-02

## 2018-10-03 ENCOUNTER — ANESTHESIA (OUTPATIENT)
Dept: PERIOP | Facility: HOSPITAL | Age: 73
End: 2018-10-03

## 2018-10-03 ENCOUNTER — HOSPITAL ENCOUNTER (OUTPATIENT)
Facility: HOSPITAL | Age: 73
Discharge: HOME | End: 2018-10-04
Attending: OBSTETRICS & GYNECOLOGY | Admitting: OBSTETRICS & GYNECOLOGY

## 2018-10-03 DIAGNOSIS — N81.11 MIDLINE CYSTOCELE: ICD-10-CM

## 2018-10-03 DIAGNOSIS — N39.3 SUI (STRESS URINARY INCONTINENCE, FEMALE): ICD-10-CM

## 2018-10-03 LAB
GLUCOSE BLDC GLUCOMTR-MCNC: 119 MG/DL (ref 70–130)
GLUCOSE BLDC GLUCOMTR-MCNC: 153 MG/DL (ref 70–130)
GLUCOSE BLDC GLUCOMTR-MCNC: 287 MG/DL (ref 70–130)
POTASSIUM BLDA-SCNC: 4.44 MMOL/L (ref 3.5–5.3)

## 2018-10-03 PROCEDURE — 25010000003 CEFAZOLIN 1-4 GM/50ML-% SOLUTION: Performed by: OBSTETRICS & GYNECOLOGY

## 2018-10-03 PROCEDURE — 82962 GLUCOSE BLOOD TEST: CPT

## 2018-10-03 PROCEDURE — A9270 NON-COVERED ITEM OR SERVICE: HCPCS | Performed by: OBSTETRICS & GYNECOLOGY

## 2018-10-03 PROCEDURE — 25010000002 FENTANYL CITRATE (PF) 100 MCG/2ML SOLUTION: Performed by: NURSE ANESTHETIST, CERTIFIED REGISTERED

## 2018-10-03 PROCEDURE — 57240 ANTERIOR COLPORRHAPHY: CPT | Performed by: OBSTETRICS & GYNECOLOGY

## 2018-10-03 PROCEDURE — C1771 REP DEV, URINARY, W/SLING: HCPCS | Performed by: OBSTETRICS & GYNECOLOGY

## 2018-10-03 PROCEDURE — 63710000001 TERAZOSIN 1 MG CAPSULE: Performed by: OBSTETRICS & GYNECOLOGY

## 2018-10-03 PROCEDURE — C1769 GUIDE WIRE: HCPCS | Performed by: OBSTETRICS & GYNECOLOGY

## 2018-10-03 PROCEDURE — 25010000002 HYDROMORPHONE PER 4 MG: Performed by: ANESTHESIOLOGY

## 2018-10-03 PROCEDURE — 63710000001 GABAPENTIN 300 MG CAPSULE: Performed by: OBSTETRICS & GYNECOLOGY

## 2018-10-03 PROCEDURE — 63710000001 FAMOTIDINE 20 MG TABLET: Performed by: ANESTHESIOLOGY

## 2018-10-03 PROCEDURE — 63710000001 HYDROCODONE-ACETAMINOPHEN 5-325 MG TABLET: Performed by: OBSTETRICS & GYNECOLOGY

## 2018-10-03 PROCEDURE — 63710000001 LEVOTHYROXINE 75 MCG TABLET: Performed by: OBSTETRICS & GYNECOLOGY

## 2018-10-03 PROCEDURE — 25010000002 PROPOFOL 1000 MG/ML EMULSION: Performed by: NURSE ANESTHETIST, CERTIFIED REGISTERED

## 2018-10-03 PROCEDURE — C1758 CATHETER, URETERAL: HCPCS | Performed by: OBSTETRICS & GYNECOLOGY

## 2018-10-03 PROCEDURE — 63710000001 ALLOPURINOL 100 MG TABLET: Performed by: OBSTETRICS & GYNECOLOGY

## 2018-10-03 PROCEDURE — 25010000003 CEFAZOLIN IN DEXTROSE 2-4 GM/100ML-% SOLUTION: Performed by: OBSTETRICS & GYNECOLOGY

## 2018-10-03 PROCEDURE — 25010000002 DEXAMETHASONE PER 1 MG: Performed by: NURSE ANESTHETIST, CERTIFIED REGISTERED

## 2018-10-03 PROCEDURE — 25010000002 ONDANSETRON PER 1 MG: Performed by: NURSE ANESTHETIST, CERTIFIED REGISTERED

## 2018-10-03 PROCEDURE — 63710000001 AMITRIPTYLINE 50 MG TABLET: Performed by: OBSTETRICS & GYNECOLOGY

## 2018-10-03 PROCEDURE — 25010000002 PHENYLEPHRINE PER 1 ML: Performed by: OBSTETRICS & GYNECOLOGY

## 2018-10-03 PROCEDURE — 25010000002 PROPOFOL 10 MG/ML EMULSION: Performed by: NURSE ANESTHETIST, CERTIFIED REGISTERED

## 2018-10-03 PROCEDURE — 84132 ASSAY OF SERUM POTASSIUM: CPT | Performed by: ANESTHESIOLOGY

## 2018-10-03 PROCEDURE — 63710000001 REPAGLINIDE 0.5 MG TABLET: Performed by: OBSTETRICS & GYNECOLOGY

## 2018-10-03 PROCEDURE — A9270 NON-COVERED ITEM OR SERVICE: HCPCS | Performed by: ANESTHESIOLOGY

## 2018-10-03 DEVICE — SUPRAPUBIC MID-URETHRAL SLING
Type: IMPLANTABLE DEVICE | Status: FUNCTIONAL
Brand: LYNX™ SYSTEM

## 2018-10-03 RX ORDER — CEFAZOLIN SODIUM 2 G/100ML
2 INJECTION, SOLUTION INTRAVENOUS
Status: DISCONTINUED | OUTPATIENT
Start: 2018-10-03 | End: 2018-10-03 | Stop reason: HOSPADM

## 2018-10-03 RX ORDER — LIDOCAINE HYDROCHLORIDE 10 MG/ML
0.5 INJECTION, SOLUTION EPIDURAL; INFILTRATION; INTRACAUDAL; PERINEURAL ONCE AS NEEDED
Status: COMPLETED | OUTPATIENT
Start: 2018-10-03 | End: 2018-10-03

## 2018-10-03 RX ORDER — HYDROCODONE BITARTRATE AND ACETAMINOPHEN 5; 325 MG/1; MG/1
1 TABLET ORAL EVERY 8 HOURS PRN
Status: DISCONTINUED | OUTPATIENT
Start: 2018-10-03 | End: 2018-10-04 | Stop reason: HOSPADM

## 2018-10-03 RX ORDER — FENTANYL CITRATE 50 UG/ML
50 INJECTION, SOLUTION INTRAMUSCULAR; INTRAVENOUS
Status: DISCONTINUED | OUTPATIENT
Start: 2018-10-03 | End: 2018-10-03 | Stop reason: HOSPADM

## 2018-10-03 RX ORDER — HYDROMORPHONE HYDROCHLORIDE 1 MG/ML
0.5 INJECTION, SOLUTION INTRAMUSCULAR; INTRAVENOUS; SUBCUTANEOUS
Status: DISCONTINUED | OUTPATIENT
Start: 2018-10-03 | End: 2018-10-03 | Stop reason: HOSPADM

## 2018-10-03 RX ORDER — PROMETHAZINE HYDROCHLORIDE 25 MG/1
25 TABLET ORAL ONCE AS NEEDED
Status: DISCONTINUED | OUTPATIENT
Start: 2018-10-03 | End: 2018-10-03 | Stop reason: HOSPADM

## 2018-10-03 RX ORDER — PANTOPRAZOLE SODIUM 40 MG/1
40 TABLET, DELAYED RELEASE ORAL EVERY MORNING
Status: DISCONTINUED | OUTPATIENT
Start: 2018-10-04 | End: 2018-10-04 | Stop reason: HOSPADM

## 2018-10-03 RX ORDER — ONDANSETRON 2 MG/ML
INJECTION INTRAMUSCULAR; INTRAVENOUS AS NEEDED
Status: DISCONTINUED | OUTPATIENT
Start: 2018-10-03 | End: 2018-10-03 | Stop reason: SURG

## 2018-10-03 RX ORDER — LIDOCAINE HYDROCHLORIDE 10 MG/ML
INJECTION, SOLUTION EPIDURAL; INFILTRATION; INTRACAUDAL; PERINEURAL AS NEEDED
Status: DISCONTINUED | OUTPATIENT
Start: 2018-10-03 | End: 2018-10-03 | Stop reason: SURG

## 2018-10-03 RX ORDER — DILTIAZEM HYDROCHLORIDE 300 MG/1
300 CAPSULE, COATED, EXTENDED RELEASE ORAL DAILY
Status: DISCONTINUED | OUTPATIENT
Start: 2018-10-04 | End: 2018-10-04 | Stop reason: HOSPADM

## 2018-10-03 RX ORDER — REPAGLINIDE 0.5 MG/1
1 TABLET ORAL
Status: DISCONTINUED | OUTPATIENT
Start: 2018-10-03 | End: 2018-10-04 | Stop reason: HOSPADM

## 2018-10-03 RX ORDER — SODIUM CHLORIDE, SODIUM LACTATE, POTASSIUM CHLORIDE, CALCIUM CHLORIDE 600; 310; 30; 20 MG/100ML; MG/100ML; MG/100ML; MG/100ML
9 INJECTION, SOLUTION INTRAVENOUS CONTINUOUS
Status: DISCONTINUED | OUTPATIENT
Start: 2018-10-03 | End: 2018-10-03

## 2018-10-03 RX ORDER — TERAZOSIN 1 MG/1
1 CAPSULE ORAL NIGHTLY
Status: DISCONTINUED | OUTPATIENT
Start: 2018-10-03 | End: 2018-10-04 | Stop reason: HOSPADM

## 2018-10-03 RX ORDER — SODIUM CHLORIDE 0.9 % (FLUSH) 0.9 %
3 SYRINGE (ML) INJECTION EVERY 12 HOURS SCHEDULED
Status: DISCONTINUED | OUTPATIENT
Start: 2018-10-03 | End: 2018-10-03 | Stop reason: HOSPADM

## 2018-10-03 RX ORDER — LEVOTHYROXINE SODIUM 0.07 MG/1
75 TABLET ORAL
Status: DISCONTINUED | OUTPATIENT
Start: 2018-10-03 | End: 2018-10-04 | Stop reason: HOSPADM

## 2018-10-03 RX ORDER — DOCUSATE SODIUM 100 MG/1
100 CAPSULE, LIQUID FILLED ORAL 2 TIMES DAILY PRN
Status: DISCONTINUED | OUTPATIENT
Start: 2018-10-03 | End: 2018-10-04 | Stop reason: HOSPADM

## 2018-10-03 RX ORDER — PROPOFOL 10 MG/ML
VIAL (ML) INTRAVENOUS AS NEEDED
Status: DISCONTINUED | OUTPATIENT
Start: 2018-10-03 | End: 2018-10-03 | Stop reason: SURG

## 2018-10-03 RX ORDER — HYDROCODONE BITARTRATE AND ACETAMINOPHEN 5; 325 MG/1; MG/1
1 TABLET ORAL EVERY 4 HOURS PRN
Status: DISCONTINUED | OUTPATIENT
Start: 2018-10-03 | End: 2018-10-04 | Stop reason: HOSPADM

## 2018-10-03 RX ORDER — AMITRIPTYLINE HYDROCHLORIDE 50 MG/1
50 TABLET, FILM COATED ORAL NIGHTLY
Status: DISCONTINUED | OUTPATIENT
Start: 2018-10-03 | End: 2018-10-04 | Stop reason: HOSPADM

## 2018-10-03 RX ORDER — PROMETHAZINE HYDROCHLORIDE 25 MG/ML
6.25 INJECTION, SOLUTION INTRAMUSCULAR; INTRAVENOUS ONCE AS NEEDED
Status: DISCONTINUED | OUTPATIENT
Start: 2018-10-03 | End: 2018-10-03 | Stop reason: HOSPADM

## 2018-10-03 RX ORDER — IBUPROFEN 600 MG/1
600 TABLET ORAL EVERY 6 HOURS PRN
Status: DISCONTINUED | OUTPATIENT
Start: 2018-10-03 | End: 2018-10-04 | Stop reason: HOSPADM

## 2018-10-03 RX ORDER — GABAPENTIN 300 MG/1
300 CAPSULE ORAL 3 TIMES DAILY
Status: DISCONTINUED | OUTPATIENT
Start: 2018-10-03 | End: 2018-10-04 | Stop reason: HOSPADM

## 2018-10-03 RX ORDER — ONDANSETRON 4 MG/1
4 TABLET, FILM COATED ORAL EVERY 6 HOURS PRN
Status: DISCONTINUED | OUTPATIENT
Start: 2018-10-03 | End: 2018-10-04 | Stop reason: HOSPADM

## 2018-10-03 RX ORDER — SODIUM CHLORIDE 0.9 % (FLUSH) 0.9 %
3-10 SYRINGE (ML) INJECTION AS NEEDED
Status: DISCONTINUED | OUTPATIENT
Start: 2018-10-03 | End: 2018-10-03 | Stop reason: HOSPADM

## 2018-10-03 RX ORDER — KETOROLAC TROMETHAMINE 15 MG/ML
15 INJECTION, SOLUTION INTRAMUSCULAR; INTRAVENOUS EVERY 6 HOURS PRN
Status: DISCONTINUED | OUTPATIENT
Start: 2018-10-03 | End: 2018-10-04 | Stop reason: HOSPADM

## 2018-10-03 RX ORDER — HYDROXYCHLOROQUINE SULFATE 200 MG/1
200 TABLET, FILM COATED ORAL DAILY
Status: DISCONTINUED | OUTPATIENT
Start: 2018-10-04 | End: 2018-10-04 | Stop reason: HOSPADM

## 2018-10-03 RX ORDER — ZOLPIDEM TARTRATE 5 MG/1
5 TABLET ORAL NIGHTLY PRN
Status: DISCONTINUED | OUTPATIENT
Start: 2018-10-03 | End: 2018-10-04 | Stop reason: HOSPADM

## 2018-10-03 RX ORDER — DEXTROSE AND SODIUM CHLORIDE 5; .45 G/100ML; G/100ML
100 INJECTION, SOLUTION INTRAVENOUS CONTINUOUS
Status: DISCONTINUED | OUTPATIENT
Start: 2018-10-03 | End: 2018-10-04 | Stop reason: HOSPADM

## 2018-10-03 RX ORDER — PROMETHAZINE HYDROCHLORIDE 12.5 MG/1
12.5 TABLET ORAL EVERY 4 HOURS PRN
Status: DISCONTINUED | OUTPATIENT
Start: 2018-10-03 | End: 2018-10-04 | Stop reason: HOSPADM

## 2018-10-03 RX ORDER — SODIUM CHLORIDE 9 MG/ML
INJECTION, SOLUTION INTRAVENOUS AS NEEDED
Status: DISCONTINUED | OUTPATIENT
Start: 2018-10-03 | End: 2018-10-03 | Stop reason: HOSPADM

## 2018-10-03 RX ORDER — SIMETHICONE 80 MG
80 TABLET,CHEWABLE ORAL 4 TIMES DAILY PRN
Status: DISCONTINUED | OUTPATIENT
Start: 2018-10-03 | End: 2018-10-04 | Stop reason: HOSPADM

## 2018-10-03 RX ORDER — PROMETHAZINE HYDROCHLORIDE 12.5 MG/1
12.5 SUPPOSITORY RECTAL EVERY 4 HOURS PRN
Status: DISCONTINUED | OUTPATIENT
Start: 2018-10-03 | End: 2018-10-04 | Stop reason: HOSPADM

## 2018-10-03 RX ORDER — BISACODYL 5 MG/1
10 TABLET, DELAYED RELEASE ORAL DAILY PRN
Status: DISCONTINUED | OUTPATIENT
Start: 2018-10-03 | End: 2018-10-04 | Stop reason: HOSPADM

## 2018-10-03 RX ORDER — ALLOPURINOL 100 MG/1
100 TABLET ORAL 2 TIMES DAILY
Status: DISCONTINUED | OUTPATIENT
Start: 2018-10-03 | End: 2018-10-04 | Stop reason: HOSPADM

## 2018-10-03 RX ORDER — PROMETHAZINE HYDROCHLORIDE 25 MG/1
25 SUPPOSITORY RECTAL ONCE AS NEEDED
Status: DISCONTINUED | OUTPATIENT
Start: 2018-10-03 | End: 2018-10-03 | Stop reason: HOSPADM

## 2018-10-03 RX ORDER — CEFAZOLIN SODIUM 1 G/50ML
1 INJECTION, SOLUTION INTRAVENOUS EVERY 8 HOURS
Status: COMPLETED | OUTPATIENT
Start: 2018-10-03 | End: 2018-10-04

## 2018-10-03 RX ORDER — ONDANSETRON 2 MG/ML
4 INJECTION INTRAMUSCULAR; INTRAVENOUS EVERY 6 HOURS PRN
Status: DISCONTINUED | OUTPATIENT
Start: 2018-10-03 | End: 2018-10-04 | Stop reason: HOSPADM

## 2018-10-03 RX ORDER — SODIUM CHLORIDE 0.9 % (FLUSH) 0.9 %
1-10 SYRINGE (ML) INJECTION AS NEEDED
Status: DISCONTINUED | OUTPATIENT
Start: 2018-10-03 | End: 2018-10-03 | Stop reason: HOSPADM

## 2018-10-03 RX ORDER — FUROSEMIDE 20 MG/1
10 TABLET ORAL DAILY
Status: DISCONTINUED | OUTPATIENT
Start: 2018-10-04 | End: 2018-10-04 | Stop reason: HOSPADM

## 2018-10-03 RX ORDER — PROMETHAZINE HYDROCHLORIDE 25 MG/ML
12.5 INJECTION, SOLUTION INTRAMUSCULAR; INTRAVENOUS EVERY 4 HOURS PRN
Status: DISCONTINUED | OUTPATIENT
Start: 2018-10-03 | End: 2018-10-04 | Stop reason: HOSPADM

## 2018-10-03 RX ORDER — DEXAMETHASONE SODIUM PHOSPHATE 4 MG/ML
INJECTION, SOLUTION INTRA-ARTICULAR; INTRALESIONAL; INTRAMUSCULAR; INTRAVENOUS; SOFT TISSUE AS NEEDED
Status: DISCONTINUED | OUTPATIENT
Start: 2018-10-03 | End: 2018-10-03 | Stop reason: SURG

## 2018-10-03 RX ORDER — FENTANYL CITRATE 50 UG/ML
INJECTION, SOLUTION INTRAMUSCULAR; INTRAVENOUS AS NEEDED
Status: DISCONTINUED | OUTPATIENT
Start: 2018-10-03 | End: 2018-10-03 | Stop reason: SURG

## 2018-10-03 RX ORDER — HYDROCODONE BITARTRATE AND ACETAMINOPHEN 10; 325 MG/1; MG/1
1 TABLET ORAL EVERY 6 HOURS PRN
Status: DISCONTINUED | OUTPATIENT
Start: 2018-10-03 | End: 2018-10-04 | Stop reason: HOSPADM

## 2018-10-03 RX ORDER — MAGNESIUM HYDROXIDE 1200 MG/15ML
LIQUID ORAL AS NEEDED
Status: DISCONTINUED | OUTPATIENT
Start: 2018-10-03 | End: 2018-10-03 | Stop reason: HOSPADM

## 2018-10-03 RX ORDER — FAMOTIDINE 20 MG/1
20 TABLET, FILM COATED ORAL ONCE
Status: COMPLETED | OUTPATIENT
Start: 2018-10-03 | End: 2018-10-03

## 2018-10-03 RX ADMIN — PROPOFOL 150 MCG/KG/MIN: 10 INJECTION, EMULSION INTRAVENOUS at 14:04

## 2018-10-03 RX ADMIN — PROPOFOL 150 MG: 10 INJECTION, EMULSION INTRAVENOUS at 12:24

## 2018-10-03 RX ADMIN — DEXAMETHASONE SODIUM PHOSPHATE 8 MG: 4 INJECTION, SOLUTION INTRAMUSCULAR; INTRAVENOUS at 12:32

## 2018-10-03 RX ADMIN — Medication 3 ML: at 16:38

## 2018-10-03 RX ADMIN — FENTANYL CITRATE 50 MCG: 50 INJECTION INTRAMUSCULAR; INTRAVENOUS at 14:48

## 2018-10-03 RX ADMIN — FAMOTIDINE 20 MG: 20 TABLET ORAL at 10:37

## 2018-10-03 RX ADMIN — ALLOPURINOL 100 MG: 100 TABLET ORAL at 21:31

## 2018-10-03 RX ADMIN — LIDOCAINE HYDROCHLORIDE 0.3 ML: 10 INJECTION, SOLUTION EPIDURAL; INFILTRATION; INTRACAUDAL; PERINEURAL at 10:37

## 2018-10-03 RX ADMIN — METHYLENE BLUE 5 ML: 10 INJECTION INTRAVENOUS at 13:50

## 2018-10-03 RX ADMIN — HYDROCODONE BITARTRATE AND ACETAMINOPHEN 1 TABLET: 5; 325 TABLET ORAL at 17:27

## 2018-10-03 RX ADMIN — LEVOTHYROXINE SODIUM 75 MCG: 75 TABLET ORAL at 19:00

## 2018-10-03 RX ADMIN — PROPOFOL 150 MCG/KG/MIN: 10 INJECTION, EMULSION INTRAVENOUS at 12:27

## 2018-10-03 RX ADMIN — TERAZOSIN HYDROCHLORIDE 1 MG: 1 CAPSULE ORAL at 21:32

## 2018-10-03 RX ADMIN — PROPOFOL 120 MCG/KG/MIN: 10 INJECTION, EMULSION INTRAVENOUS at 13:03

## 2018-10-03 RX ADMIN — LIDOCAINE HYDROCHLORIDE 50 MG: 10 INJECTION, SOLUTION EPIDURAL; INFILTRATION; INTRACAUDAL; PERINEURAL at 12:24

## 2018-10-03 RX ADMIN — GABAPENTIN 300 MG: 300 CAPSULE ORAL at 21:31

## 2018-10-03 RX ADMIN — REPAGLINIDE 1 MG: 0.5 TABLET ORAL at 19:00

## 2018-10-03 RX ADMIN — FENTANYL CITRATE 50 MCG: 50 INJECTION INTRAMUSCULAR; INTRAVENOUS at 14:38

## 2018-10-03 RX ADMIN — AMITRIPTYLINE HYDROCHLORIDE 50 MG: 50 TABLET, FILM COATED ORAL at 21:31

## 2018-10-03 RX ADMIN — HYDROMORPHONE HYDROCHLORIDE 0.5 MG: 1 INJECTION, SOLUTION INTRAMUSCULAR; INTRAVENOUS; SUBCUTANEOUS at 15:17

## 2018-10-03 RX ADMIN — SODIUM CHLORIDE, POTASSIUM CHLORIDE, SODIUM LACTATE AND CALCIUM CHLORIDE: 600; 310; 30; 20 INJECTION, SOLUTION INTRAVENOUS at 14:04

## 2018-10-03 RX ADMIN — HYDROCODONE BITARTRATE AND ACETAMINOPHEN 1 TABLET: 5; 325 TABLET ORAL at 21:31

## 2018-10-03 RX ADMIN — SODIUM CHLORIDE, POTASSIUM CHLORIDE, SODIUM LACTATE AND CALCIUM CHLORIDE 9 ML/HR: 600; 310; 30; 20 INJECTION, SOLUTION INTRAVENOUS at 10:37

## 2018-10-03 RX ADMIN — FENTANYL CITRATE 50 MCG: 50 INJECTION, SOLUTION INTRAMUSCULAR; INTRAVENOUS at 13:36

## 2018-10-03 RX ADMIN — FENTANYL CITRATE 50 MCG: 50 INJECTION, SOLUTION INTRAMUSCULAR; INTRAVENOUS at 12:24

## 2018-10-03 RX ADMIN — SODIUM CHLORIDE 500 ML: 9 INJECTION, SOLUTION INTRAVENOUS at 14:43

## 2018-10-03 RX ADMIN — DEXTROSE AND SODIUM CHLORIDE 100 ML/HR: 5; 450 INJECTION, SOLUTION INTRAVENOUS at 16:37

## 2018-10-03 RX ADMIN — CEFAZOLIN SODIUM 2 G: 2 INJECTION, SOLUTION INTRAVENOUS at 12:17

## 2018-10-03 RX ADMIN — ONDANSETRON 4 MG: 2 INJECTION INTRAMUSCULAR; INTRAVENOUS at 14:14

## 2018-10-03 RX ADMIN — CEFAZOLIN SODIUM 1 G: 1 INJECTION, SOLUTION INTRAVENOUS at 21:32

## 2018-10-03 RX ADMIN — HYDROMORPHONE HYDROCHLORIDE 0.5 MG: 1 INJECTION, SOLUTION INTRAMUSCULAR; INTRAVENOUS; SUBCUTANEOUS at 15:38

## 2018-10-03 NOTE — ANESTHESIA PROCEDURE NOTES
Airway  Urgency: elective    Airway not difficult    General Information and Staff    Patient location during procedure: OR  CRNA: MERCEDES DOOLEY    Indications and Patient Condition  Indications for airway management: airway protection    Preoxygenated: yes  MILS maintained throughout  Mask difficulty assessment: 1 - vent by mask    Final Airway Details  Final airway type: supraglottic airway      Successful airway: I-gel  Size 4    Number of attempts at approach: 1    Additional Comments  Pt to OR  183. Pt moved self to OR table. ASA monitors applied. Pre-O2 with 100%. SIVI. LMA placed atraumatic. +ETCO2. +BBS.

## 2018-10-03 NOTE — ANESTHESIA POSTPROCEDURE EVALUATION
Patient: Janette Diaz    Procedure Summary     Date:  10/03/18 Room / Location:   [facility]    Anesthesia Start:  1215 Anesthesia Stop:      Procedure:  ANTERIOR VAGINAL REPAIR, TRANSVAGINAL TAPING SUSPENSION (N/A Uterus) Diagnosis:       Midline cystocele      SANTO (stress urinary incontinence, female)      (Midline cystocele [N81.11])      (SANTO (stress urinary incontinence, female) [N39.3])    Surgeon:  Mihai Best MD Provider:  Amanda Claros MD    Anesthesia Type:  general ASA Status:  3          Anesthesia Type: general  Last vitals 10/3/18 @1432  BP   115/54   Temp   97.9   Pulse   50   Resp   14   SpO2   96%-97%     Post Anesthesia Care and Evaluation    Patient location during evaluation: PACU  Patient participation: complete - patient cannot participate  Level of consciousness: responsive to physical stimuli  Pain score: 0  Pain management: adequate  Airway patency: patent  Anesthetic complications: No anesthetic complications    Cardiovascular status: stable  Respiratory status: acceptable, nasal cannula and spontaneous ventilation  Hydration status: stable    Comments: Pt transferred to PACU with O2. Vital signs stable. Report to PACU RN and care accepted.

## 2018-10-03 NOTE — BRIEF OP NOTE
ANTERIOR VAGINAL REPAIR  Progress Note    Janette Diaz  10/3/2018    Pre-op Diagnosis:   Midline cystocele [N81.11]  SANTO (stress urinary incontinence, female) [N39.3]       Post-Op Diagnosis Codes:     * Midline cystocele [N81.11]     * SANTO (stress urinary incontinence, female) [N39.3]    Procedure/CPT® Codes:      Procedure(s):  ANTERIOR VAGINAL REPAIR, TRANSVAGINAL TAPING SUSPENSION    Surgeon(s):  Mihai Best MD Grimm, Terrence R, MD    Anesthesia: General    Staff:   Circulator: Rahul Maldonado RN  Scrub Person: Joselin Hanley; Peyton Reardon; Bia Fitch  Nursing Assistant: Radha Lee CNA  [place]: Jennifer Kramer RN    Estimated Blood Loss: 350 mL    Urine Voided: * No values recorded between 10/3/2018 12:15 PM and 10/3/2018  2:29 PM *    Specimens:                none      Drains:   Urethral Catheter (Active)   Site Assessment Clean;Skin intact 9/5/2018  8:20 AM   Collection Container Leg bag 9/5/2018  8:20 AM   Securement Method Leg strap;Securing device 9/5/2018  8:20 AM       Urethral Catheter Double-lumen 16 Fr. (Active)       Findings:cystocele    Complications: n/a      Mihai Best MD     Date: 10/3/2018  Time: 2:35 PM

## 2018-10-03 NOTE — ANESTHESIA PREPROCEDURE EVALUATION
Anesthesia Evaluation     Patient summary reviewed and Nursing notes reviewed   history of anesthetic complications: PONV  NPO Solid Status: > 8 hours  NPO Liquid Status: > 8 hours           Airway   Mallampati: II  TM distance: >3 FB  Neck ROM: full  No difficulty expected  Comment: Previous grade 1 intubation with MAC 3 blade  Dental - normal exam     Pulmonary - negative pulmonary ROS    breath sounds clear to auscultation  Cardiovascular - normal exam  Exercise tolerance: good (4-7 METS)    ECG reviewed  Rhythm: regular  Rate: normal    (+) hypertension, hyperlipidemia,   (-) valvular problems/murmurs, dysrhythmias, angina, POLANCO      Neuro/Psych- negative ROS  GI/Hepatic/Renal/Endo    (+)  GERD well controlled,  renal disease (ckd), diabetes mellitus well controlled using insulin, hypothyroidism,   (-) hepatitis, liver disease    Musculoskeletal     (+) arthralgias,       ROS comment: fibromylagia  Abdominal    Substance History      OB/GYN          Other   (+) arthritis       Other Comment: anemia                  Anesthesia Plan    ASA 3     general   total IV anesthesia(Labs/studies reviewed  propofol  Pt has claustraphobia and does not like mask tightly over face)  intravenous induction   Anesthetic plan, all risks, benefits, and alternatives have been provided, discussed and informed consent has been obtained with: patient.    Plan discussed with CRNA.

## 2018-10-03 NOTE — H&P (VIEW-ONLY)
Subjective   Janette Diaz is a 72 y.o. year old  who is scheduled  for surgery due to issues with bladder continence.  She had undergone a combined laparoscopic subtotal hysterectomy with sacral pexy along with anterior posterior repair.  Postoperatively she had issues with urinary incontinence.  She also had a change in her urethral orientation which made it difficult to catheterize her for insert a cystoscope.  While the massive cystocele was reduced she still has a cystocele that is interfering potentially with voiding.  She had a history of some UTIs and was wearing a catheter for some time postoperatively.  Recently finished Levaquin.  Dr. Harden and I have discussed her case and plan to proceed with a combination of a revised anterior repair along with a mid urethral sling placement by Dr. Harden to improve her incontinence.  She voices understanding of the planned procedure and outcomes.  She wondered whether she would spend the night and I think that would be a good idea given her past history postoperatively and age.    Past Medical History:   Diagnosis Date   • Bladder prolapse, female, acquired    • Chronic kidney disease    • Diabetes mellitus (CMS/HCC)     TYPE 2. DIAGNOSED 8 YEARS AGO. TESTS BID   • Disease of thyroid gland    • Fibromyalgia    • Hyperlipidemia    • Hypertension    • Osteoarthritis    • PONV (postoperative nausea and vomiting)    • Varicose veins    • Wears glasses      Past Surgical History:   Procedure Laterality Date   • ANTERIOR AND POSTERIOR VAGINAL REPAIR N/A 2018    Procedure: ANTERIOR AND POSTERIOR VAGINAL REPAIR;  Surgeon: Mihai Best MD;  Location: [facility] OR;  Service: Gynecology   • CHOLECYSTECTOMY     • COLONOSCOPY      4-5 YEARS AGO   • CYSTOSCOPY RETROGRADE PYELOGRAM Bilateral 2018    Procedure: CYSTOSCOPY RETROGRADE PYELOGRAM;  Surgeon: Alex Harden MD;  Location: [facility] OR;  Service: Urology   • EYE SURGERY      cataract extraction with lens  implant   • JOINT REPLACEMENT      bilateral knee replacement   • SUPRACERVICAL HYSTERECTOMY SACROCOLPOPEXY N/A 7/2/2018    Procedure: LAPAROSCOPIC SUPRACERVICAL HYSTERECTOMY SACROCOLPOPEXY WITH DAVINCI ROBOT;  Surgeon: Mihai Best MD;  Location: [facility];  Service: DaVinci   • TUBAL ABDOMINAL LIGATION       Social History     Social History   • Marital status:      Social History Main Topics   • Smoking status: Never Smoker   • Smokeless tobacco: Never Used   • Alcohol use Yes      Comment: OCCASIONAL   • Drug use: No   • Sexual activity: Not Currently     Partners: Male     Other Topics Concern   • Not on file       Current Outpatient Prescriptions:   •  allopurinol (ZYLOPRIM) 100 MG tablet, Take 1 tablet by mouth 2 (Two) Times a Day., Disp: , Rfl:   •  amitriptyline (ELAVIL) 50 MG tablet, Take 50 mg by mouth nightly., Disp: , Rfl:   •  aspirin 81 MG chewable tablet, Take 81 mg by mouth daily; patient plans to stop 9-26-18, Disp: , Rfl:   •  conjugated estrogens (PREMARIN) 0.625 MG/GM vaginal cream, Use 0.5 - 2.0 grams intravaginally 1-3 times per week to control symptoms. (Patient taking differently: Insert 0.5 g into the vagina 1 (One) Time Per Week. Use 0.5 - 2.0 grams intravaginally 1-3 times per week to control symptoms; TUESDAYS), Disp: 1 each, Rfl: 12  •  diltiaZEM CD (CARTIA XT) 300 MG 24 hr capsule, Take 1 capsule by mouth Daily., Disp: , Rfl:   •  doxazosin (CARDURA) 8 MG tablet, Take 1 tablet by mouth Daily., Disp: , Rfl:   •  Ergocalciferol (VITAMIN D2 PO), Take 50,000 Units by mouth 1 (One) Time Per Week. SATURDAYS, Disp: , Rfl:   •  furosemide (LASIX) 20 MG tablet, Take 20 mg every other day, Disp: , Rfl:   •  gabapentin (NEURONTIN) 300 MG capsule, Take 300 mg by mouth 3 (Three) Times a Day., Disp: , Rfl:   •  HYDROcodone-acetaminophen (NORCO) 5-325 MG per tablet, Take 1 tablet by mouth Every 8 (Eight) Hours As Needed for Severe Pain ., Disp: 10 tablet, Rfl: 0  •  hydroxychloroquine  (PLAQUENIL) 200 MG tablet, Take 200 mg by mouth Daily., Disp: , Rfl:   •  LANTUS SOLOSTAR 100 UNIT/ML injection pen, INJECT 30 UNITS EVERY Evening, Disp: , Rfl: 0  •  levothyroxine (SYNTHROID, LEVOTHROID) 75 MCG tablet, Take 75 mcg by mouth Daily., Disp: , Rfl:   •  nateglinide (STARLIX) 120 MG tablet, Take 1 tablet by mouth 3 (three) times a day., Disp: , Rfl:   •  omeprazole (PriLOSEC) 20 MG capsule, Take 20 mg by mouth daily., Disp: , Rfl:   •  pravastatin (PRAVACHOL) 20 MG tablet, Take 20 mg by mouth daily., Disp: , Rfl:   •  Cyanocobalamin (VITAMIN B-12 IJ), Inject 1 dose as directed Every 30 (Thirty) Days., Disp: , Rfl:   •  ferrous sulfate 325 (65 FE) MG tablet, Take 325 mg by mouth 2 (Two) Times a Day., Disp: , Rfl:   •  nitrofurantoin, macrocrystal-monohydrate, (MACROBID) 100 MG capsule, Take 1 capsule by mouth 2 (Two) Times a Day for 7 days. 1 po BID for 1 week, Disp: 14 capsule, Rfl: 0  •  phenazopyridine (PYRIDIUM) 95 MG tablet, Take 1 tablet by mouth 3 (Three) Times a Day As Needed for bladder spasms., Disp: , Rfl:     Allergies   Allergen Reactions   • Codeine Rash     intolerate to codeine makes pt vomit must have phenerghan or zofran to take this     Smoking status: Never Smoker                                                              Smokeless tobacco: Never Used                        Review of Systems bowels are normal no fever no vaginal discharge      Objective   /70   Wt 70.8 kg (156 lb)   LMP  (LMP Unknown) Comment: Mammogram 2017 overdue info given   BMI 26.78 kg/m²   General: well developed; well nourished  no acute distress  appears stated age   Heart: regularly irregular rhythm   Lungs: breathing is unlabored  clear to auscultation bilaterally   Abdomen: soft, non-tender; no masses  no umbilical or inginual hernias are present  no hepato-splenomegaly   Pelvis:: Not performed.        Assessment   1. Persistent cystocele and urinary incontinence issues approximately 3 months  status post laparoscopic subtotal hysterectomy with sacral pexy and anterior and posterior repair for grade 4+ cystocele and rectocele.  2. Medical problems as above  3. At her last surgery her sugars only about 99 fasting the morning of surgery so don't think she needs take any medications for diabetes if her sugar is at that level next week.     Plan   1. Preadmission testing today               2.     Discussed nothing by mouth for least and hours preop with exception of taking her hypertensive medications early in the morning with a sip of water                3.     addendum it appears that she has a UTI and I will call in Macrobid since she had recently taken Levaquin.      Mihai Best M.D.  9/26/2018

## 2018-10-03 NOTE — OP NOTE
Date of procedure ; October 3, 2019     Preoperative diagnosis: Stress urinary incontinence                                           Recurrent/persistent cystocele     Postop diagnosis: Same as above     Procedure: Anterior colporrhaphy-Dr. Best                       Tension-free vaginal taping  & cystoscopy: Dr. Harden    General anesthesia   Estimated blood loss: 100 mL's or less     Brief history and indications for procedure: This patient is a 73-year-old  2 para 2 who underwent a extensive anterior colporrhaphy posterior colporrhaphy, supracervical hysterectomy sacral pexy early in July.  She had issues with urinary incontinence postoperatively.  She is having trouble voiding and also leaking with stress and sometimes without stress.  She had a number of UTIs.  She been hospitalized elsewhere for pyelonephritis questionable sepsis.  Due to the above problems, I consulted with Dr. Harden of urology.  He noted on cystoscopy that it was difficult to pass the cystoscope unless he reduce the recurrent or persistent part of cystocele.  It was decided to proceed with her combined anterior repair and tension-free vaginal taping with associated cystoscopy.  This was discussed the patient.  She understood the plan procedure and had opportunity to ask and have her questions answered.     Procedure: Patient was taken to operating room and placed under general incision accomplished the suprapubic standard fashion placed in dorsal lithotomy position.  Exam revealed the obvious cystocele with a slightly open introitus.  On exam under anesthesia cervix that appeared to be a little lower than expected after sacral pexy.  Minimal high rectocele noted.  A Richard was placed and the balloon inflated to determine the location of the bladder neck.  Dilute Terence-Synephrine was injected in the midline of the anterior vagina.  I made to linear incisions all lateral aspects of this from the suburethral area down to the cervix.   "Careful dissected off the vaginal mucosa with Metzenbaum scissors and open wet Ray-Antoine.  This was opened up laterally as far as possible and then to the cervix.  Not opened up entirely under the urethra as Dr. Harden was planning operating in this area.  He was present for this portion of procedure.  A pursestring suture using Vicryl was used to close the vast majority of the defect.  Series of interrupted horizontal mattress sutures were used to further reinforce the cystocele repair.     At this point he took over to do cystoscopy.  Please see his dictated portion of the procedure.  He did have difficulty cannulating the right ureteral orifice see.  We did however see urine Out of both right and left ureteral orifices.  Due to the above difficulty at take down all sutures previously placed.     Once he was done cystoscopy I replaced horizontal mattress sutures only to reinforce the cystocele repair.  He once again did cystoscopy.  There was some \"mounding up\" of the vaginal wall into the trigone area of the bladder.  This was not as significant as previously noted.  At this point.  Proceeded with the tension-free vaginal taping.  Please see his note.     I trimmed away some excess vaginal mucosa bilaterally.  The vaginal mucosa was reapproximated using 3-0 chromic suture closing the dead space.  A couple of figure-of-eight stitches required for adequate hemostasis.  The area was irrigated and found be hemostatic.  A saline soaked vaginal pack was easily placed in the vagina.  Sponge and needle counts were correct.     Patient tolerated the procedure well same the postop recovery area in stable condition.     Mihai Best MD    "

## 2018-10-04 VITALS
SYSTOLIC BLOOD PRESSURE: 131 MMHG | HEIGHT: 64 IN | WEIGHT: 156 LBS | OXYGEN SATURATION: 98 % | TEMPERATURE: 98.5 F | HEART RATE: 65 BPM | DIASTOLIC BLOOD PRESSURE: 74 MMHG | BODY MASS INDEX: 26.63 KG/M2 | RESPIRATION RATE: 16 BRPM

## 2018-10-04 LAB — GLUCOSE BLDC GLUCOMTR-MCNC: 202 MG/DL (ref 70–130)

## 2018-10-04 PROCEDURE — A9270 NON-COVERED ITEM OR SERVICE: HCPCS | Performed by: OBSTETRICS & GYNECOLOGY

## 2018-10-04 PROCEDURE — 82962 GLUCOSE BLOOD TEST: CPT

## 2018-10-04 PROCEDURE — 63710000001 PANTOPRAZOLE 40 MG TABLET DELAYED-RELEASE: Performed by: OBSTETRICS & GYNECOLOGY

## 2018-10-04 PROCEDURE — 63710000001 DILTIAZEM CD 300 MG CAPSULE SUSTAINED-RELEASE 24 HR: Performed by: OBSTETRICS & GYNECOLOGY

## 2018-10-04 PROCEDURE — 63710000001 ALLOPURINOL 100 MG TABLET: Performed by: OBSTETRICS & GYNECOLOGY

## 2018-10-04 PROCEDURE — 63710000001 HYDROXYCHLOROQUINE 200 MG TABLET: Performed by: OBSTETRICS & GYNECOLOGY

## 2018-10-04 PROCEDURE — 63710000001 HYDROCODONE-ACETAMINOPHEN 10-325 MG TABLET: Performed by: OBSTETRICS & GYNECOLOGY

## 2018-10-04 PROCEDURE — 25010000003 CEFAZOLIN 1-4 GM/50ML-% SOLUTION: Performed by: OBSTETRICS & GYNECOLOGY

## 2018-10-04 PROCEDURE — 63710000001 LEVOTHYROXINE 75 MCG TABLET: Performed by: OBSTETRICS & GYNECOLOGY

## 2018-10-04 PROCEDURE — 63710000001 HYDROCODONE-ACETAMINOPHEN 5-325 MG TABLET: Performed by: OBSTETRICS & GYNECOLOGY

## 2018-10-04 PROCEDURE — 63710000001 REPAGLINIDE 0.5 MG TABLET: Performed by: OBSTETRICS & GYNECOLOGY

## 2018-10-04 PROCEDURE — 63710000001 GABAPENTIN 300 MG CAPSULE: Performed by: OBSTETRICS & GYNECOLOGY

## 2018-10-04 PROCEDURE — 25010000002 KETOROLAC TROMETHAMINE PER 15 MG: Performed by: OBSTETRICS & GYNECOLOGY

## 2018-10-04 PROCEDURE — 63710000001 FUROSEMIDE 20 MG TABLET: Performed by: OBSTETRICS & GYNECOLOGY

## 2018-10-04 RX ORDER — OXYCODONE HYDROCHLORIDE 5 MG/1
5 TABLET ORAL EVERY 6 HOURS PRN
Qty: 20 TABLET | Refills: 0 | Status: SHIPPED | OUTPATIENT
Start: 2018-10-04 | End: 2020-10-12 | Stop reason: SDUPTHER

## 2018-10-04 RX ADMIN — HYDROCODONE BITARTRATE AND ACETAMINOPHEN 1 TABLET: 10; 325 TABLET ORAL at 11:25

## 2018-10-04 RX ADMIN — KETOROLAC TROMETHAMINE 15 MG: 15 INJECTION, SOLUTION INTRAMUSCULAR; INTRAVENOUS at 08:50

## 2018-10-04 RX ADMIN — HYDROXYCHLOROQUINE SULFATE 200 MG: 200 TABLET, FILM COATED ORAL at 08:50

## 2018-10-04 RX ADMIN — HYDROCODONE BITARTRATE AND ACETAMINOPHEN 1 TABLET: 10; 325 TABLET ORAL at 17:53

## 2018-10-04 RX ADMIN — REPAGLINIDE 1 MG: 0.5 TABLET ORAL at 17:52

## 2018-10-04 RX ADMIN — LEVOTHYROXINE SODIUM 75 MCG: 75 TABLET ORAL at 05:23

## 2018-10-04 RX ADMIN — PANTOPRAZOLE SODIUM 40 MG: 40 TABLET, DELAYED RELEASE ORAL at 05:23

## 2018-10-04 RX ADMIN — GABAPENTIN 300 MG: 300 CAPSULE ORAL at 08:50

## 2018-10-04 RX ADMIN — CEFAZOLIN SODIUM 1 G: 1 INJECTION, SOLUTION INTRAVENOUS at 05:24

## 2018-10-04 RX ADMIN — HYDROCODONE BITARTRATE AND ACETAMINOPHEN 1 TABLET: 5; 325 TABLET ORAL at 05:23

## 2018-10-04 RX ADMIN — FUROSEMIDE 10 MG: 20 TABLET ORAL at 08:50

## 2018-10-04 RX ADMIN — REPAGLINIDE 1 MG: 0.5 TABLET ORAL at 11:25

## 2018-10-04 RX ADMIN — ALLOPURINOL 100 MG: 100 TABLET ORAL at 08:50

## 2018-10-04 RX ADMIN — GABAPENTIN 300 MG: 300 CAPSULE ORAL at 17:52

## 2018-10-04 RX ADMIN — DILTIAZEM HYDROCHLORIDE 300 MG: 300 CAPSULE, EXTENDED RELEASE ORAL at 08:51

## 2018-10-04 NOTE — DISCHARGE SUMMARY
Jreald  Janette Diaz  : 1945  MRN: [id number]  CSN: [id number]    Discharge Summary      Date of Admission: 10/3/2018   Date of Discharge: 10/4/18   Discharge Diagnosis: SANTO & cystocele   Procedures Performed: Procedure(s):  ANTERIOR VAGINAL REPAIR, TRANSVAGINAL TAPING SUSPENSION      Consults: Dr Garcia   Brief History: Patient is a 73 y.o.who presented with post operative SANTO and persistent cystocele although much smaller than prior to surgery in 2018. Dr Harden was consulted pre operatively and decision made to proceed with combined surgery.   Hospital Course: She tolerated surgery well and voided the following morning and was felt ready for discharge. I requested nursing teach her self cath techniques in the event of retention   Pending Studies: none   Condition at discharge: gradually improving   Discharge Medications:    Your medication list      START taking these medications      Instructions Last Dose Given Next Dose Due   oxyCODONE 5 MG immediate release tablet  Commonly known as:  ROXICODONE      Take 1 tablet by mouth Every 6 (Six) Hours As Needed for Severe Pain .          CHANGE how you take these medications      Instructions Last Dose Given Next Dose Due   conjugated estrogens 0.625 MG/GM vaginal cream  Commonly known as:  PREMARIN  What changed:  · how much to take  · how to take this  · when to take this  · additional instructions      Use 0.5 - 2.0 grams intravaginally 1-3 times per week to control symptoms.          CONTINUE taking these medications      Instructions Last Dose Given Next Dose Due   allopurinol 100 MG tablet  Commonly known as:  ZYLOPRIM      Take 1 tablet by mouth 2 (Two) Times a Day.       amitriptyline 50 MG tablet  Commonly known as:  ELAVIL      Take 50 mg by mouth nightly.       aspirin 81 MG chewable tablet      Take 81 mg by mouth daily; patient plans to stop 18       CARTIA  MG 24 hr capsule  Generic drug:  diltiaZEM CD      Take 1 capsule  by mouth Daily.       doxazosin 8 MG tablet  Commonly known as:  CARDURA      Take 1 tablet by mouth Daily.       ferrous sulfate 325 (65 FE) MG tablet      Take 325 mg by mouth 2 (Two) Times a Day.       furosemide 20 MG tablet  Commonly known as:  LASIX      Take 20 mg every other day       gabapentin 300 MG capsule  Commonly known as:  NEURONTIN      Take 300 mg by mouth 3 (Three) Times a Day.       hydroxychloroquine 200 MG tablet  Commonly known as:  PLAQUENIL      Take 200 mg by mouth Daily.       LANTUS SOLOSTAR 100 UNIT/ML injection pen  Generic drug:  Insulin Glargine      INJECT 30 UNITS EVERY Evening       levothyroxine 75 MCG tablet  Commonly known as:  SYNTHROID, LEVOTHROID      Take 75 mcg by mouth Daily.       nateglinide 120 MG tablet  Commonly known as:  STARLIX      Take 1 tablet by mouth 3 (three) times a day.       omeprazole 20 MG capsule  Commonly known as:  priLOSEC      Take 20 mg by mouth daily.       phenazopyridine 95 MG tablet  Commonly known as:  PYRIDIUM      Take 1 tablet by mouth 3 (Three) Times a Day As Needed for bladder spasms.       pravastatin 20 MG tablet  Commonly known as:  PRAVACHOL      Take 20 mg by mouth daily.       VITAMIN B-12 IJ      Inject 1 dose as directed Every 30 (Thirty) Days.       VITAMIN D2 PO      Take 50,000 Units by mouth 1 (One) Time Per Week. SATURDAYS          STOP taking these medications    HYDROcodone-acetaminophen 5-325 MG per tablet  Commonly known as:  NORCO        nitrofurantoin (macrocrystal-monohydrate) 100 MG capsule  Commonly known as:  MACROBID              Where to Get Your Medications      These medications were sent to [facility] [address] [place] [address] - [address] [place] [address] [phone]  - [phone]   [address] [facility] [address]    Phone:  [phone] ·   oxyCODONE 5 MG immediate release tablet        Discharge Disposition: home no lifting over 15 lbs, no driving    Follow-up: 2 weeks         This note has been  electronically signed.    Mihai Best MD  October 4, 2018

## 2018-10-04 NOTE — PLAN OF CARE
Problem: Patient Care Overview  Goal: Plan of Care Review  Outcome: Ongoing (interventions implemented as appropriate)   10/04/18 0304 10/04/18 0800 10/04/18 1529   Coping/Psychosocial   Plan of Care Reviewed With --  patient --    Plan of Care Review   Progress improving --  --    OTHER   Outcome Summary --  --  Pt VSS up ambulating in room able to void plan to DC home       Problem: Fall Risk (Adult)  Goal: Identify Related Risk Factors and Signs and Symptoms  Outcome: Ongoing (interventions implemented as appropriate)   10/04/18 1529   Fall Risk (Adult)   Related Risk Factors (Fall Risk) bladder function altered   Signs and Symptoms (Fall Risk) presence of risk factors     Goal: Absence of Fall  Outcome: Ongoing (interventions implemented as appropriate)   10/04/18 1529   Fall Risk (Adult)   Absence of Fall making progress toward outcome

## 2018-10-04 NOTE — OP NOTE
Date of operation 10/3/18  Pre-op diagnosis: Urinary incontinence, cystocele  Postoperative diagnosis: Same  Procedure performed: Cystoscopy with bilateral ureteral catheterization, tension-free vaginal tape  Surgeon: Fina Alaniz.:Nasir    Indications: Janette Cota 73-year-old woman who had a complex pelvic surgery earlier this year and has had urinary incontinence since then.  She has a recurrence of a prominent cystocele although less so than before her previous operation.  She has urinary incontinence that I feel will be greatly increased after restoration of her normal anatomy/repair of cystocele.     Operative description: Patient was anesthetized with general anesthesia and was addition in the dorsolithotomy position.  Groin was prepped and draped in usual sterile fashion.  I assisted Dr. Best in the dissection of the cystocele and the repair.  That portion she be dictated separately.      After initial repair of cystocele performed cystoscopy and noted a central heaped up area of the trigone.  I was unable to detect urine output from either orifice so I cannulated each of the orifices individually.  I was able to pass a 5 Persian ureteral catheter up the left orifice but could never pass a catheter or wire up the right orifice so the cystocele repair was taken down and reperformed.    After the second repair I was able to cannulate the left orifice.  By this time I had tried so many times to cannulate the right orifice that I felt that the duration/flap would not allow passage of a wire.  We did detect urine flow(had administered methylene blue) from that ureter so I did not feel like it was obstructed.    At this point I opened the vaginal mucosa overlying the urethra and dissected laterally until I could palpate the endopelvic fascia.  I made 2 small 1 cm incisions up in the suprapubic groin fold.  I then opened the [employer] links TVT kit and drained the bladder.  I passed the passers through the  retropubic space and digitally directed them out through the vaginal incisions after perforating the endopelvic fascia.  I then performed cystoscopy again with the 70 lens and noted no perforation of the bladder whatsoever.  I attached the tape and pulled that up through the retropubic space up to the upper incisions.  With an instrument between the urethra and the tape so that it would be tension-free I then pulled removed the plastic sheath and cut the extra tape.  I closed the upper incisions with a single 4-0 Vicryl.    I then close the vaginal incision over the urethra with a running locking 2-0 Vicryl.  Richard catheter was left indwelling.  At this point I returned the surgery to Dr. Best for the completion of the cystocele repair and that portion she be dictated separately.    The hospital dictation system is broken so this was done on a voice recognition system.  There may be significant transcription errors.

## 2018-10-04 NOTE — PLAN OF CARE
Problem: Patient Care Overview  Goal: Plan of Care Review  Outcome: Ongoing (interventions implemented as appropriate)   10/04/18 0304   Coping/Psychosocial   Plan of Care Reviewed With patient   Plan of Care Review   Progress improving   OTHER   Outcome Summary no changes/events overnight, patient rested well, pain controlled with PRN medication. Vaginal packing and davis to be removed in am. Plan for dc 10/4/2018

## 2018-10-04 NOTE — PROGRESS NOTES
"Daily Progress Note    Patient: Janette LEE [facility] Day: 2    Subjective: awake and alert.  Has some pain but it's not severe.  Vaginal pack and Richard catheter had already been removed.    Objective:     /58 (BP Location: Right arm, Patient Position: Lying)   Pulse 70   Temp 97.8 °F (36.6 °C) (Oral)   Resp 18   Ht 162.6 cm (64\")   Wt 70.8 kg (156 lb)   LMP  (LMP Unknown) Comment: POST-MENOPAUSAL  SpO2 93%   BMI 26.78 kg/m²       Intake/Output Summary (Last 24 hours) at 10/04/18 0712  Last data filed at 10/04/18 0508   Gross per 24 hour   Intake             1200 ml   Output             1975 ml   Net             -775 ml       :    Physical Exam:   General Appearance: alert, appears stated age and cooperative  Head: normocephalic, without obvious abnormality and atraumatic  Lungs respirations regular  Abdomen no masses and soft non-tender  Extremities moves extremities well, no edema, no cyanosis and no redness      Lab Results (last 24 hours)     Procedure Component Value Units Date/Time    POC Glucose Once [[id number]]  (Abnormal) Collected:  10/04/18 0522    Specimen:  Blood Updated:  10/04/18 0523     Glucose 202 (H) mg/dL     Narrative:       Meter: [id number] : [id number] Skin Scana    POC Glucose Once [[id number]]  (Abnormal) Collected:  10/03/18 2255    Specimen:  Blood Updated:  10/03/18 2256     Glucose 287 (H) mg/dL     Narrative:       Meter: [id number] : [id number] TiVo Fior    POC Glucose Once [[id number]]  (Abnormal) Collected:  10/03/18 1525    Specimen:  Blood Updated:  10/03/18 1546     Glucose 153 (H) mg/dL     Narrative:       Meter: [id number] : [id number] Raza JUAN    OR Potassium [[id number]]  (Normal) Collected:  10/03/18 1020    Specimen:  Blood Updated:  10/03/18 1033     Potassium, OR 4.44 mmol/L     POC Glucose Once [[id number]]  (Normal) Collected:  10/03/18 1022    Specimen:  Blood Updated:  10/03/18 1024     Glucose 119 mg/dL     Narrative:       Meter: " [id number] : [id number] Uziel JUAN            Assessment/Plan: looks great early on postop day 1 from cystocele repair and TVT.  Okay to go home from urologic standpoint after she voids.      Patient Active Problem List   Diagnosis   • Varicose veins   • Osteoarthritis   • Hypertension   • Hyperlipidemia   • Fibromyalgia   • Disease of thyroid gland   • Diabetes mellitus (CMS/HCC)   • Chronic kidney disease   • Menopause   • Cystocele, midline   • Vulvar atrophy   • Vaginal delivery 1965 -  9 lbs. 6 oz.; 1970 -7 lbs. 3 oz.   • History of bilateral knee replacement (left 2005-right 2012)   • S/P cholecystectomy 1977   • Cataract surgery 2016   • Osteopenia DEXA 2017   • Family history of breast cancer daughter diagnosed at age 43   • Post-operative nausea and vomiting   • Rectocele   • A & P repair/ sifrd-[id number]-nvxs/subtotal hyst July 2018   • H/O urinary incontinence postop July 2018   • UTI (urinary tract infection) due to Enterococcus July 2018   • Midline cystocele   • SANTO (stress urinary incontinence, female)        Diagnosis Plan   1. SANTO (stress urinary incontinence, female)     2. Midline cystocele           Alex Harden MD - 10/4/2018, 7:12 AM

## 2018-10-31 ENCOUNTER — OFFICE VISIT (OUTPATIENT)
Dept: OBSTETRICS AND GYNECOLOGY | Facility: CLINIC | Age: 73
End: 2018-10-31

## 2018-10-31 VITALS
RESPIRATION RATE: 16 BRPM | BODY MASS INDEX: 27.12 KG/M2 | DIASTOLIC BLOOD PRESSURE: 70 MMHG | SYSTOLIC BLOOD PRESSURE: 126 MMHG | WEIGHT: 158 LBS

## 2018-10-31 DIAGNOSIS — Z09 SURGERY FOLLOW-UP: Primary | ICD-10-CM

## 2018-10-31 DIAGNOSIS — N39.46 MIXED INCONTINENCE URGE AND STRESS: ICD-10-CM

## 2018-10-31 PROBLEM — T83.712A EROSION OF TRANSOBDURATOR MID-URETHRAL SLING: Status: ACTIVE | Noted: 2018-10-31

## 2018-10-31 PROCEDURE — 99024 POSTOP FOLLOW-UP VISIT: CPT | Performed by: OBSTETRICS & GYNECOLOGY

## 2018-10-31 NOTE — PROGRESS NOTES
Subjective   Chief Complaint   Patient presents with   • Post-op     not much better     Janette Diaz is a 73 y.o. year old  presenting to be seen for her post-operative visit.  She had a repair of cystocele and tension-free taping by Dr. Renato Harden.  Currently she reports pain is well controlled, stress incontinence and urge incontinence.  She is having to wear a diaper constantly as she has to go the bathroom when she is out shopping.  She's leaking without warning.  Also leaking some when she coughs last sneezes.  She is about given up regarding her bladder.  There is less pressure and pain.  She voided when she got here voided just before the exam and leak some on the table.    The following portions of the patient's history were reviewed and updated as appropriate:problem list, current medications and allergies    Review of Systems bladder as above     Objective   /70   Resp 16   Wt 71.7 kg (158 lb)   LMP  (LMP Unknown) Comment: Mammogram 2017 overdue info given   BMI 27.12 kg/m²     General:  well developed; well nourished  no acute distress  appears stated age   Abdomen: soft, non-tender; no masses  no umbilical or inginual hernias are present  no hepato-splenomegaly   Pelvis: Clinical staff was present for exam  External genitalia:  normal appearance of the external genitalia including Bartholin's and Ridgefield's glands.  :  urethral meatus normal; urethral hypermobility is absent.  Vaginal:  atrophic mucosal changes are present;  Cervix:  stenotic;  Uterus:  absent.  Adnexa:  non palpable bilaterally.  Rectal:  digital rectal exam not performed; anus visually normal appearing.   Adequate anterior support during Valsalva examination.  Incision line healing well and as expected           Assessment   1. She is now about 4 weeks postop from anterior repair by myself and mid urethral sling by Dr. Harden.  She's having urge and stress incontinence which has persisted.  She has not seen   Fina yet postoperatively.  Due to the urgent incontinence I'll add Myrbetriq 25 mg give her about 8 weeks of samples.  Would encourage timed voiding which she is already doing.  Avoid liquids at night 3 hours prior to sleep.  I think would be good idea to see Dr. Harden as if this persists she may need to consider potential collagen injections if this is a urethral issue.  This has been a cough Cases this worsened after repair of a total vaginal prolapse issue.  2. It appears that the bladder or cystocele has been reduced for the time being.  There is good support on examination with no descent during Valsalva on examination today.     Plan   1. Will add Myrbetriq once a day #56 samples given 25 mg  2. She is Joe voiding frequently, avoid liquids at night, I don't think Kegel's have been helpful in the past.  3. Follow with Dr. Harden to see if he has any additional options should the Myrbetriq not be helpful.  Obviously need to give this some time prior to any other procedures.  I'll send a copy of today's note    Medications Rx this encounter:  New Medications Ordered This Visit   Medications   • Mirabegron ER (MYRBETRIQ) 25 MG tablet sustained-release 24 hour 24 hr tablet     Sig: Take 1 tablet by mouth Daily.     Dispense:  30 tablet     Refill:  10          This note was electronically signed.    Mihai Best MD  October 31, 2018

## 2018-11-02 ENCOUNTER — HOSPITAL ENCOUNTER (OUTPATIENT)
Facility: HOSPITAL | Age: 73
Discharge: HOME | End: 2018-11-02
Payer: MEDICARE

## 2018-11-02 LAB
ALBUMIN SERPL-MCNC: 4.7 G/DL (ref 3.4–4.8)
ANION GAP SERPL CALCULATED.3IONS-SCNC: 15 MMOL/L (ref 3–16)
BILIRUBIN URINE: NEGATIVE
BLOOD, URINE: NEGATIVE
BUN BLDV-MCNC: 29 MG/DL (ref 6–20)
CALCIUM SERPL-MCNC: 10 MG/DL (ref 8.5–10.5)
CHLORIDE BLD-SCNC: 102 MMOL/L (ref 98–107)
CHOLESTEROL, TOTAL: 183 MG/DL (ref 0–200)
CLARITY: CLEAR
CO2: 23 MMOL/L (ref 20–30)
COLOR: YELLOW
CREAT SERPL-MCNC: 1.4 MG/DL (ref 0.4–1.2)
CREATININE URINE: 157.9 MG/DL (ref 1.5–300)
GFR AFRICAN AMERICAN: 44
GFR NON-AFRICAN AMERICAN: 37
GLUCOSE BLD-MCNC: 144 MG/DL (ref 74–106)
GLUCOSE URINE: NEGATIVE MG/DL
HBA1C MFR BLD: 7.4 %
HDLC SERPL-MCNC: 55 MG/DL (ref 40–60)
KETONES, URINE: NEGATIVE MG/DL
LDL CHOLESTEROL CALCULATED: 88 MG/DL
LEUKOCYTE ESTERASE, URINE: NEGATIVE
MICROSCOPIC EXAMINATION: NORMAL
NITRITE, URINE: NEGATIVE
PARATHYROID HORMONE INTACT: 52.4 PG/ML (ref 14–72)
PH UA: 5.5
PHOSPHORUS: 5.5 MG/DL (ref 2.5–4.5)
POTASSIUM SERPL-SCNC: 4.8 MMOL/L (ref 3.4–5.1)
PROTEIN PROTEIN: 18 MG/DL
PROTEIN UA: NEGATIVE MG/DL
SODIUM BLD-SCNC: 140 MMOL/L (ref 136–145)
SPECIFIC GRAVITY UA: 1.02
TRIGL SERPL-MCNC: 198 MG/DL (ref 0–249)
TSH SERPL DL<=0.05 MIU/L-ACNC: 4.2 UIU/ML (ref 0.35–5.5)
URIC ACID, SERUM: 5 MG/DL (ref 2.5–7.1)
URINE TYPE: NORMAL
UROBILINOGEN, URINE: 0.2 E.U./DL
VITAMIN D 25-HYDROXY: 39.7 (ref 32–100)
VLDLC SERPL CALC-MCNC: 40 MG/DL

## 2018-11-02 PROCEDURE — 82570 ASSAY OF URINE CREATININE: CPT

## 2018-11-02 PROCEDURE — 83036 HEMOGLOBIN GLYCOSYLATED A1C: CPT

## 2018-11-02 PROCEDURE — 81003 URINALYSIS AUTO W/O SCOPE: CPT

## 2018-11-02 PROCEDURE — 84550 ASSAY OF BLOOD/URIC ACID: CPT

## 2018-11-02 PROCEDURE — 80069 RENAL FUNCTION PANEL: CPT

## 2018-11-02 PROCEDURE — 36415 COLL VENOUS BLD VENIPUNCTURE: CPT

## 2018-11-02 PROCEDURE — 84443 ASSAY THYROID STIM HORMONE: CPT

## 2018-11-02 PROCEDURE — 80061 LIPID PANEL: CPT

## 2018-11-02 PROCEDURE — 82306 VITAMIN D 25 HYDROXY: CPT

## 2018-11-02 PROCEDURE — 84156 ASSAY OF PROTEIN URINE: CPT

## 2018-11-02 PROCEDURE — 83970 ASSAY OF PARATHORMONE: CPT

## 2019-05-08 ENCOUNTER — HOSPITAL ENCOUNTER (OUTPATIENT)
Facility: HOSPITAL | Age: 74
Discharge: HOME | End: 2019-05-08
Payer: MEDICARE

## 2019-05-08 LAB
CREATININE URINE: 54.2 MG/DL (ref 28–259)
HBA1C MFR BLD: 5.9 %
PARATHYROID HORMONE INTACT: 30.3 PG/ML (ref 14–72)
PROTEIN PROTEIN: 9 MG/DL
PROTEIN/CREAT RATIO: 0.2 MG/DL
TSH SERPL DL<=0.05 MIU/L-ACNC: 0.44 UIU/ML (ref 0.35–5.5)
VITAMIN D 25-HYDROXY: 43 (ref 32–100)

## 2019-05-08 PROCEDURE — 83970 ASSAY OF PARATHORMONE: CPT

## 2019-05-08 PROCEDURE — 82306 VITAMIN D 25 HYDROXY: CPT

## 2019-05-08 PROCEDURE — 36415 COLL VENOUS BLD VENIPUNCTURE: CPT

## 2019-05-08 PROCEDURE — 84443 ASSAY THYROID STIM HORMONE: CPT

## 2019-05-08 PROCEDURE — 83036 HEMOGLOBIN GLYCOSYLATED A1C: CPT

## 2019-05-08 PROCEDURE — 84156 ASSAY OF PROTEIN URINE: CPT

## 2019-05-08 PROCEDURE — 82570 ASSAY OF URINE CREATININE: CPT

## 2019-07-23 ENCOUNTER — HOSPITAL ENCOUNTER (OUTPATIENT)
Dept: GENERAL RADIOLOGY | Facility: HOSPITAL | Age: 74
Discharge: HOME | End: 2019-07-23
Payer: MEDICARE

## 2019-07-23 ENCOUNTER — HOSPITAL ENCOUNTER (OUTPATIENT)
Facility: HOSPITAL | Age: 74
Discharge: HOME | End: 2019-07-23
Payer: MEDICARE

## 2019-07-23 DIAGNOSIS — R05.9 COUGH: ICD-10-CM

## 2019-07-23 PROCEDURE — 71046 X-RAY EXAM CHEST 2 VIEWS: CPT

## 2020-02-04 ENCOUNTER — HOSPITAL ENCOUNTER (OUTPATIENT)
Facility: HOSPITAL | Age: 75
Discharge: HOME | End: 2020-02-04
Payer: MEDICARE

## 2020-02-04 LAB
ALBUMIN SERPL-MCNC: 4 G/DL (ref 3.4–4.8)
ANION GAP SERPL CALCULATED.3IONS-SCNC: 7 MMOL/L (ref 3–16)
BILIRUBIN URINE: NEGATIVE
BLOOD, URINE: NEGATIVE
BUN BLDV-MCNC: 24 MG/DL (ref 6–20)
CALCIUM SERPL-MCNC: 9.8 MG/DL (ref 8.5–10.5)
CHLORIDE BLD-SCNC: 102 MMOL/L (ref 98–107)
CLARITY: CLEAR
CO2: 29 MMOL/L (ref 20–30)
COLOR: YELLOW
CREAT SERPL-MCNC: 1 MG/DL (ref 0.4–1.2)
CREATININE URINE: 102.4 MG/DL (ref 28–259)
EPITHELIAL CELLS, UA: NORMAL /HPF
GFR AFRICAN AMERICAN: >59
GFR NON-AFRICAN AMERICAN: 54
GLUCOSE BLD-MCNC: 69 MG/DL (ref 74–106)
GLUCOSE URINE: NEGATIVE MG/DL
HBA1C MFR BLD: 6.1 %
KETONES, URINE: NEGATIVE MG/DL
LEUKOCYTE ESTERASE, URINE: ABNORMAL
MICROSCOPIC EXAMINATION: YES
NITRITE, URINE: NEGATIVE
PARATHYROID HORMONE INTACT: 35.1 PG/ML (ref 14–72)
PH UA: 6.5 (ref 5–8)
PHOSPHORUS: 3.8 MG/DL (ref 2.5–4.5)
POTASSIUM SERPL-SCNC: 5 MMOL/L (ref 3.4–5.1)
PROTEIN PROTEIN: 9 MG/DL
PROTEIN UA: NEGATIVE MG/DL
PROTEIN/CREAT RATIO: 0.1 MG/DL
RBC UA: NORMAL /HPF (ref 0–2)
SODIUM BLD-SCNC: 138 MMOL/L (ref 136–145)
SPECIFIC GRAVITY UA: 1.01 (ref 1–1.03)
TSH SERPL DL<=0.05 MIU/L-ACNC: 3.99 UIU/ML (ref 0.35–5.5)
URIC ACID, SERUM: 3.8 MG/DL (ref 2.5–7.1)
URINE TYPE: ABNORMAL
UROBILINOGEN, URINE: 0.2 E.U./DL
VITAMIN D 25-HYDROXY: 26.2 (ref 32–100)
WBC UA: NORMAL /HPF (ref 0–5)

## 2020-02-04 PROCEDURE — 84550 ASSAY OF BLOOD/URIC ACID: CPT

## 2020-02-04 PROCEDURE — 82306 VITAMIN D 25 HYDROXY: CPT

## 2020-02-04 PROCEDURE — 36415 COLL VENOUS BLD VENIPUNCTURE: CPT

## 2020-02-04 PROCEDURE — 80069 RENAL FUNCTION PANEL: CPT

## 2020-02-04 PROCEDURE — 82570 ASSAY OF URINE CREATININE: CPT

## 2020-02-04 PROCEDURE — 84443 ASSAY THYROID STIM HORMONE: CPT

## 2020-02-04 PROCEDURE — 81001 URINALYSIS AUTO W/SCOPE: CPT

## 2020-02-04 PROCEDURE — 83970 ASSAY OF PARATHORMONE: CPT

## 2020-02-04 PROCEDURE — 83036 HEMOGLOBIN GLYCOSYLATED A1C: CPT

## 2020-02-04 PROCEDURE — 84156 ASSAY OF PROTEIN URINE: CPT

## 2020-06-26 ENCOUNTER — TRANSCRIBE ORDERS (OUTPATIENT)
Dept: MAMMOGRAPHY | Facility: HOSPITAL | Age: 75
End: 2020-06-26

## 2020-06-26 DIAGNOSIS — Z12.31 ENCOUNTER FOR SCREENING MAMMOGRAM FOR BREAST CANCER: Primary | ICD-10-CM

## 2020-08-05 ENCOUNTER — APPOINTMENT (OUTPATIENT)
Dept: MAMMOGRAPHY | Facility: HOSPITAL | Age: 75
End: 2020-08-05

## 2020-09-08 ENCOUNTER — HOSPITAL ENCOUNTER (OUTPATIENT)
Dept: MAMMOGRAPHY | Facility: HOSPITAL | Age: 75
Discharge: HOME | End: 2020-09-08
Admitting: FAMILY MEDICINE

## 2020-09-08 DIAGNOSIS — Z12.31 ENCOUNTER FOR SCREENING MAMMOGRAM FOR BREAST CANCER: ICD-10-CM

## 2020-09-08 PROCEDURE — 77067 SCR MAMMO BI INCL CAD: CPT

## 2020-09-08 PROCEDURE — 77063 BREAST TOMOSYNTHESIS BI: CPT

## 2020-10-12 ENCOUNTER — OFFICE VISIT (OUTPATIENT)
Dept: ENDOCRINOLOGY | Facility: CLINIC | Age: 75
End: 2020-10-12

## 2020-10-12 VITALS
SYSTOLIC BLOOD PRESSURE: 151 MMHG | HEIGHT: 64 IN | HEART RATE: 78 BPM | BODY MASS INDEX: 22.61 KG/M2 | WEIGHT: 132.4 LBS | DIASTOLIC BLOOD PRESSURE: 66 MMHG

## 2020-10-12 DIAGNOSIS — I10 ESSENTIAL HYPERTENSION: ICD-10-CM

## 2020-10-12 DIAGNOSIS — E11.9 TYPE 2 DIABETES MELLITUS WITHOUT COMPLICATION, WITHOUT LONG-TERM CURRENT USE OF INSULIN (HCC): Primary | ICD-10-CM

## 2020-10-12 DIAGNOSIS — E78.5 HYPERLIPIDEMIA, UNSPECIFIED HYPERLIPIDEMIA TYPE: ICD-10-CM

## 2020-10-12 LAB
EXPIRATION DATE: NORMAL
HBA1C MFR BLD: 6.8 %
Lab: NORMAL

## 2020-10-12 PROCEDURE — 83036 HEMOGLOBIN GLYCOSYLATED A1C: CPT | Performed by: PHYSICIAN ASSISTANT

## 2020-10-12 PROCEDURE — 99213 OFFICE O/P EST LOW 20 MIN: CPT | Performed by: PHYSICIAN ASSISTANT

## 2020-10-12 RX ORDER — HYDROCODONE BITARTRATE AND ACETAMINOPHEN 5; 325 MG/1; MG/1
TABLET ORAL
COMMUNITY

## 2020-10-12 RX ORDER — NATEGLINIDE 120 MG/1
120 TABLET ORAL 3 TIMES DAILY
COMMUNITY
Start: 2020-09-29 | End: 2020-10-12 | Stop reason: SDUPTHER

## 2020-10-12 RX ORDER — PRAVASTATIN SODIUM 20 MG
TABLET ORAL DAILY
COMMUNITY

## 2020-10-12 RX ORDER — CARVEDILOL 25 MG/1
1 TABLET, FILM COATED ORAL 3 TIMES DAILY
COMMUNITY
Start: 2020-08-12 | End: 2021-04-12 | Stop reason: SDUPTHER

## 2020-10-12 RX ORDER — IPRATROPIUM BROMIDE 21 UG/1
SPRAY, METERED NASAL
COMMUNITY
Start: 2020-08-09 | End: 2020-10-12 | Stop reason: SDUPTHER

## 2020-10-12 RX ORDER — NICOTINE POLACRILEX 4 MG/1
GUM, CHEWING ORAL AS NEEDED
COMMUNITY

## 2020-10-12 RX ORDER — METOCLOPRAMIDE 5 MG/1
TABLET ORAL
COMMUNITY
End: 2021-04-12

## 2020-10-12 RX ORDER — GABAPENTIN 400 MG/1
CAPSULE ORAL 3 TIMES DAILY
COMMUNITY

## 2020-10-12 RX ORDER — NATEGLINIDE 120 MG/1
120 TABLET ORAL 3 TIMES DAILY
Qty: 90 TABLET | Refills: 11 | Status: SHIPPED | OUTPATIENT
Start: 2020-10-12 | End: 2021-04-12 | Stop reason: SDUPTHER

## 2020-10-12 RX ORDER — ALLOPURINOL 100 MG/1
TABLET ORAL DAILY
COMMUNITY
End: 2020-10-12 | Stop reason: SDUPTHER

## 2020-10-12 RX ORDER — DOXAZOSIN 8 MG/1
TABLET ORAL DAILY
COMMUNITY

## 2020-10-12 RX ORDER — IPRATROPIUM BROMIDE 21 UG/1
SPRAY, METERED NASAL
COMMUNITY
End: 2022-01-25

## 2020-10-12 RX ORDER — LEVOTHYROXINE SODIUM 0.07 MG/1
TABLET ORAL DAILY
COMMUNITY

## 2020-10-12 NOTE — PROGRESS NOTES
"     Office Note      Date: 10/12/2020  Patient Name: Janette Diaz  MRN: [id number]  : 1945    Chief Complaint   Patient presents with   • Diabetes       History of Present Illness:   Janette Diaz is a 75 y.o. female who presents today for diabetes. She remains on nateglinide with meals. She is testing FSBS 1x per day. Fasting FSBS have been <130. She denies any hypoglycemia. She denies any problems with her feet. Eye exam up to date. She has been able to gain some weight back. She is able to eat some things along with tube feeds. She continues to follow with nephrology.    Subjective      Review of Systems:   Review of Systems   Constitutional: Negative for activity change, appetite change, chills, diaphoresis, fatigue, fever and unexpected weight change.   HENT: Positive for hearing loss and postnasal drip.    Cardiovascular: Negative for chest pain, palpitations and leg swelling.   Gastrointestinal: Negative for abdominal distention, abdominal pain, constipation, diarrhea, nausea and vomiting.   Endocrine: Negative for cold intolerance, heat intolerance, polydipsia, polyphagia and polyuria.   Genitourinary: Positive for urgency.   Musculoskeletal: Positive for arthralgias and joint swelling.       The following portions of the patient's history were reviewed and updated as appropriate: allergies, current medications, past family history, past medical history, past social history, past surgical history and problem list.    Objective     Vitals:    10/12/20 1451   BP: 151/66   BP Location: Right arm   Patient Position: Sitting   Cuff Size: Adult   Pulse: 78   Weight: 60.1 kg (132 lb 6.4 oz)   Height: 162.6 cm (64\")   PainSc:   2     Body mass index is 22.73 kg/m².    Physical Exam  Vitals signs reviewed.   Constitutional:       General: She is not in acute distress.     Appearance: Normal appearance.   Cardiovascular:      Pulses:           Dorsalis pedis pulses are 2+ on the right side and 2+ on the " left side.        Posterior tibial pulses are 2+ on the right side and 2+ on the left side.   Musculoskeletal:      Right foot: Bunion present.      Left foot: No deformity.   Feet:      Right foot:      Protective Sensation: 8 sites tested. 8 sites sensed.      Skin integrity: Skin integrity normal.      Toenail Condition: Right toenails are long.      Left foot:      Protective Sensation: 8 sites tested. 8 sites sensed.      Skin integrity: Skin integrity normal.      Toenail Condition: Left toenails are long.   Neurological:      Mental Status: She is alert.   Psychiatric:         Mood and Affect: Mood and affect normal.         HEMOGLOBIN A1C  Lab Results   Component Value Date    HGBA1C 6.8 10/12/2020       Current Outpatient Medications   Medication Instructions   • allopurinol (ZYLOPRIM) 100 MG tablet 1 tablet, Oral, 2 Times Daily   • allopurinol (ZYLOPRIM) 100 MG tablet Daily   • amitriptyline (ELAVIL) 50 MG tablet Take 50 mg by mouth nightly.   • calcium citrate-vitamin d (CALCITRATE) 315-250 MG-UNIT tablet tablet 1 tablet, Oral, 2 Times Daily   • Contour Test test strip 1 each, Other, 3 Times Daily   • doxazosin (CARDURA) 8 MG tablet Daily   • furosemide (LASIX) 20 MG tablet Take 20 mg every other day   • gabapentin (NEURONTIN) 400 MG capsule 3 Times Daily   • glucose blood test strip Daily   • HYDROcodone-acetaminophen (NORCO) 5-325 MG per tablet hydrocodone 5 mg-acetaminophen 325 mg tablet   TAKE 1 TABLET BY MOUTH 4 TIMES DAILY AS NEEDED   • ipratropium (ATROVENT) 0.03 % nasal spray ipratropium bromide 0.03 % nasal spray   USE 2 SPRAY(S) IN EACH NOSTRIL THREE TIMES DAILY AS NEEDED   • ipratropium (ATROVENT) 0.03 % nasal spray USE 2 SPRAY(S) IN EACH NOSTRIL THREE TIMES DAILY AS NEEDED   • levothyroxine (SYNTHROID, LEVOTHROID) 75 MCG tablet Daily   • metoclopramide (REGLAN) 5 MG tablet metoclopramide 5 mg tablet   CRUSH 1 TABLET BY TUBE 30 MINUTES PRIOR TO TUBE FEEDING EVERY MEAL (UP TO 4 TIMES DAILY)   •  nateglinide (STARLIX) 120 mg, Oral, 3 Times Daily, with meals.   • Nutritional Supplements (GLUCERNA 1.5 MUSHTAQ PO) 2 times daily   • Omeprazole 20 MG tablet delayed-release As Needed   • pravastatin (PRAVACHOL) 20 MG tablet Daily       Assessment / Plan      Assessment & Plan:  1. Diabetes mellitus type 2, controlled, without complications  A1c okay. No known hypoglycemia.  - POC Glycosylated Hemoglobin (Hb A1C)    2. Essential hypertension  BP higher than usual today. She reports recent BP at goal when checked elsewhere.    3. Hyperlipidemia, unspecified hyperlipidemia type  Continue statin.      Return in about 6 months (around 4/12/2021) for Next scheduled follow up.     TAMARA Tejada  10/12/2020

## 2021-01-04 ENCOUNTER — HOSPITAL ENCOUNTER (OUTPATIENT)
Facility: HOSPITAL | Age: 76
Discharge: HOME | End: 2021-01-04
Payer: MEDICARE

## 2021-01-04 LAB
ALBUMIN SERPL-MCNC: 3.7 G/DL (ref 3.4–4.8)
ANION GAP SERPL CALCULATED.3IONS-SCNC: 7 MMOL/L (ref 3–16)
BASOPHILS ABSOLUTE: 0 K/UL (ref 0–0.1)
BASOPHILS RELATIVE PERCENT: 0.6 %
BILIRUBIN URINE: NEGATIVE
BLOOD, URINE: NEGATIVE
BUN BLDV-MCNC: 20 MG/DL (ref 6–20)
CALCIUM SERPL-MCNC: 10.1 MG/DL (ref 8.5–10.5)
CHLORIDE BLD-SCNC: 104 MMOL/L (ref 98–107)
CLARITY: CLEAR
CO2: 27 MMOL/L (ref 20–30)
COLOR: YELLOW
CREAT SERPL-MCNC: 1.3 MG/DL (ref 0.4–1.2)
CREATININE URINE: 139.9 MG/DL (ref 28–259)
EOSINOPHILS ABSOLUTE: 0.5 K/UL (ref 0–0.4)
EOSINOPHILS RELATIVE PERCENT: 9.9 %
EPITHELIAL CELLS, UA: ABNORMAL /HPF (ref 0–5)
GFR AFRICAN AMERICAN: 48
GFR NON-AFRICAN AMERICAN: 40
GLUCOSE BLD-MCNC: 168 MG/DL (ref 74–106)
GLUCOSE URINE: NEGATIVE MG/DL
HBA1C MFR BLD: 7.2 %
HCT VFR BLD CALC: 32.8 % (ref 37–47)
HEMOGLOBIN: 10.2 G/DL (ref 11.5–16.5)
IMMATURE GRANULOCYTES #: 0 K/UL
IMMATURE GRANULOCYTES %: 0.4 % (ref 0–5)
KETONES, URINE: NEGATIVE MG/DL
LEUKOCYTE ESTERASE, URINE: ABNORMAL
LYMPHOCYTES ABSOLUTE: 1.3 K/UL (ref 1.5–4)
LYMPHOCYTES RELATIVE PERCENT: 23.5 %
MCH RBC QN AUTO: 30.2 PG (ref 27–32)
MCHC RBC AUTO-ENTMCNC: 31.1 G/DL (ref 31–35)
MCV RBC AUTO: 97 FL (ref 80–100)
MICROSCOPIC EXAMINATION: YES
MONOCYTES ABSOLUTE: 0.6 K/UL (ref 0.2–0.8)
MONOCYTES RELATIVE PERCENT: 10.4 %
MUCUS: ABNORMAL /LPF
NEUTROPHILS ABSOLUTE: 3 K/UL (ref 2–7.5)
NEUTROPHILS RELATIVE PERCENT: 55.2 %
NITRITE, URINE: NEGATIVE
PARATHYROID HORMONE INTACT: 15.9 PG/ML (ref 14–72)
PDW BLD-RTO: 13.1 % (ref 11–16)
PH UA: 5.5 (ref 5–8)
PHOSPHORUS: 4.7 MG/DL (ref 2.5–4.5)
PLATELET # BLD: 146 K/UL (ref 150–400)
PMV BLD AUTO: 9.8 FL (ref 6–10)
POTASSIUM SERPL-SCNC: 5.1 MMOL/L (ref 3.4–5.1)
PROTEIN PROTEIN: 14 MG/DL
PROTEIN UA: NEGATIVE MG/DL
PROTEIN/CREAT RATIO: 0.1 MG/DL
RBC # BLD: 3.38 M/UL (ref 3.8–5.8)
RBC UA: ABNORMAL /HPF (ref 0–4)
SODIUM BLD-SCNC: 138 MMOL/L (ref 136–145)
SPECIFIC GRAVITY UA: >=1.03 (ref 1–1.03)
TSH SERPL DL<=0.05 MIU/L-ACNC: 1.58 UIU/ML (ref 0.27–4.2)
URINE TYPE: ABNORMAL
UROBILINOGEN, URINE: 0.2 E.U./DL
VITAMIN D 25-HYDROXY: 32.8 (ref 32–100)
WBC # BLD: 5.4 K/UL (ref 4–11)
WBC UA: ABNORMAL /HPF (ref 0–5)

## 2021-01-04 PROCEDURE — 36415 COLL VENOUS BLD VENIPUNCTURE: CPT

## 2021-01-04 PROCEDURE — 81001 URINALYSIS AUTO W/SCOPE: CPT

## 2021-01-04 PROCEDURE — 80069 RENAL FUNCTION PANEL: CPT

## 2021-01-04 PROCEDURE — 85025 COMPLETE CBC W/AUTO DIFF WBC: CPT

## 2021-01-04 PROCEDURE — 84156 ASSAY OF PROTEIN URINE: CPT

## 2021-01-04 PROCEDURE — 84443 ASSAY THYROID STIM HORMONE: CPT

## 2021-01-04 PROCEDURE — 82570 ASSAY OF URINE CREATININE: CPT

## 2021-01-04 PROCEDURE — 83036 HEMOGLOBIN GLYCOSYLATED A1C: CPT

## 2021-01-04 PROCEDURE — 82306 VITAMIN D 25 HYDROXY: CPT

## 2021-01-04 PROCEDURE — 83970 ASSAY OF PARATHORMONE: CPT

## 2021-02-02 ENCOUNTER — TELEPHONE (OUTPATIENT)
Dept: ENDOCRINOLOGY | Facility: CLINIC | Age: 76
End: 2021-02-02

## 2021-02-02 RX ORDER — INSULIN LISPRO 100 [IU]/ML
INJECTION, SOLUTION INTRAVENOUS; SUBCUTANEOUS
Qty: 3 ML | Refills: 0 | Status: SHIPPED | OUTPATIENT
Start: 2021-02-02 | End: 2022-01-25

## 2021-02-02 RX ORDER — INSULIN ASPART 100 [IU]/ML
INJECTION, SOLUTION INTRAVENOUS; SUBCUTANEOUS
Qty: 3 ML | Refills: 0 | Status: SHIPPED | OUTPATIENT
Start: 2021-02-02 | End: 2021-02-02

## 2021-02-02 RX ORDER — PEN NEEDLE, DIABETIC 32GX 5/32"
NEEDLE, DISPOSABLE MISCELLANEOUS
Qty: 50 EACH | Refills: 0 | Status: SHIPPED | OUTPATIENT
Start: 2021-02-02 | End: 2022-01-25

## 2021-02-02 NOTE — TELEPHONE ENCOUNTER
Spoke with patient.  She reports being on steroids 2 weeks ago.  She was prescribed another course of steroids today.  She reports being given steroid injection today as well.  She reports fasting FSBS 120-130 last week after completing first steroid dose pack.  Will add Novolog for correction - 5 units every 4 hours until glucose less than 250.  Drink plenty of water.  Advised to call back as needed.

## 2021-02-02 NOTE — TELEPHONE ENCOUNTER
PATIENT IS CURRENTLY STERIODS FOR CONGESTION IN HER LUNGS. THIS IS CAUSING HER BLOOD SUGARS TO BE ELEVATED. SHE NEEDS LANTUS AND HUMALOG CALLED INTO HER PHARMACY. HER SUGAR  THIS MORNING.

## 2021-02-02 NOTE — TELEPHONE ENCOUNTER
Spoke to pt.  She has not taken insulin for 1-2 years but asked me to call some in to pharmacy because she is on steroids and her bs's are in the 200's.  Please advise

## 2021-02-02 NOTE — TELEPHONE ENCOUNTER
[facility] IN [place], [place] CALLED STATING THAT THIS PT'S INSURANCE DOES NOT COVER NOVOLOG AND HUMALOG IS PREFERRED. PLEASE CONFER W/ DOC AND SEND IN PRESCRIPTION. THANK YOU

## 2021-04-12 ENCOUNTER — OFFICE VISIT (OUTPATIENT)
Dept: ENDOCRINOLOGY | Facility: CLINIC | Age: 76
End: 2021-04-12

## 2021-04-12 VITALS
TEMPERATURE: 97.8 F | DIASTOLIC BLOOD PRESSURE: 60 MMHG | HEIGHT: 64 IN | WEIGHT: 134.6 LBS | SYSTOLIC BLOOD PRESSURE: 118 MMHG | BODY MASS INDEX: 22.98 KG/M2

## 2021-04-12 DIAGNOSIS — E11.9 TYPE 2 DIABETES MELLITUS WITHOUT COMPLICATION, WITHOUT LONG-TERM CURRENT USE OF INSULIN (HCC): Primary | Chronic | ICD-10-CM

## 2021-04-12 LAB
EXPIRATION DATE: NORMAL
HBA1C MFR BLD: 7 %
Lab: NORMAL

## 2021-04-12 PROCEDURE — 99213 OFFICE O/P EST LOW 20 MIN: CPT | Performed by: PHYSICIAN ASSISTANT

## 2021-04-12 PROCEDURE — 83036 HEMOGLOBIN GLYCOSYLATED A1C: CPT | Performed by: PHYSICIAN ASSISTANT

## 2021-04-12 RX ORDER — CARVEDILOL 25 MG/1
TABLET, FILM COATED ORAL
Qty: 100 EACH | Refills: 3 | Status: SHIPPED | OUTPATIENT
Start: 2021-04-12 | End: 2022-01-25 | Stop reason: SDUPTHER

## 2021-04-12 RX ORDER — ALBUTEROL SULFATE 90 UG/1
AEROSOL, METERED RESPIRATORY (INHALATION) AS NEEDED
COMMUNITY
Start: 2021-02-02 | End: 2022-01-25

## 2021-04-12 RX ORDER — CHLORHEXIDINE GLUCONATE 0.12 MG/ML
RINSE ORAL DAILY
COMMUNITY
Start: 2021-03-12

## 2021-04-12 RX ORDER — NATEGLINIDE 120 MG/1
120 TABLET ORAL
Qty: 90 TABLET | Refills: 11 | Status: SHIPPED | OUTPATIENT
Start: 2021-04-12 | End: 2022-01-25 | Stop reason: SDUPTHER

## 2021-04-12 RX ORDER — ERGOCALCIFEROL 1.25 MG/1
50000 CAPSULE ORAL
COMMUNITY
Start: 2021-03-29

## 2021-04-12 NOTE — PROGRESS NOTES
"     Office Note      Date: 2021  Patient Name: Janette Diaz  MRN: [id number]  : 1945    Chief Complaint   Patient presents with   • Follow-up   • Diabetes       History of Present Illness:   Janette Diaz is a 75 y.o. female who presents today for type 2 diabetes.  She remains on nateglinide with meals.  She is eating, but does tube feeds as supplement some days.  She is testing FSBS once per day.  Fasting readings are typically in the 120s.  She denies any hypoglycemia.  She was on steroids in January and February for bronchitis.  She reports blood sugars over 200 and 300 at that time.  She called and we sent in Humalog to take PRN.  She denies any problems with her feet.  Foot exam up to date.  Eye exam up to date - 2021.  She remains on statin.  Kidneys - followed by nephrology.    Subjective      Review of Systems:   Review of Systems   Constitutional: Negative.    Cardiovascular: Negative.    Gastrointestinal: Negative.    Endocrine: Negative.        The following portions of the patient's history were reviewed and updated as appropriate: allergies, current medications, past family history, past medical history, past social history, past surgical history and problem list.    Objective     Vitals:    21 1342   BP: 118/60   Temp: 97.8 °F (36.6 °C)   TempSrc: Infrared   Weight: 61.1 kg (134 lb 9.6 oz)   Height: 162.6 cm (64\")     Body mass index is 23.1 kg/m².    Physical Exam  Vitals reviewed.   Constitutional:       General: She is not in acute distress.  Neurological:      Mental Status: She is alert and oriented to person, place, and time.   Psychiatric:         Mood and Affect: Affect normal.         HEMOGLOBIN A1C  Lab Results   Component Value Date    HGBA1C 7.0 2021    HGBA1C 7.2 (H) 2021    HGBA1C 6.8 10/12/2020       Current Outpatient Medications   Medication Instructions   • albuterol sulfate  (90 Base) MCG/ACT inhaler As Needed   • allopurinol " (ZYLOPRIM) 100 MG tablet 1 tablet, Oral, Daily   • amitriptyline (ELAVIL) 50 MG tablet Take 50 mg by mouth nightly.   • calcium citrate-vitamin d (CALCITRATE) 315-250 MG-UNIT tablet tablet 1 tablet, Oral, 2 Times Daily   • chlorhexidine (PERIDEX) 0.12 % solution Daily   • Contour Test test strip Testing 1 time per day; E11.9   • doxazosin (CARDURA) 8 MG tablet Daily   • furosemide (LASIX) 20 MG tablet Take 20 mg every other day   • gabapentin (NEURONTIN) 400 MG capsule 3 Times Daily   • HYDROcodone-acetaminophen (NORCO) 5-325 MG per tablet hydrocodone 5 mg-acetaminophen 325 mg tablet   TAKE 1 TABLET BY MOUTH 4 TIMES DAILY AS NEEDED   • Insulin Lispro, 1 Unit Dial, (HumaLOG KwikPen) 100 UNIT/ML solution pen-injector Inject 5 units every 4 hours until glucose is less than 250   • Insulin Pen Needle (BD Pen Needle Farida U/F) 32G X 4 MM misc Use up to 3 daily as directed   • ipratropium (ATROVENT) 0.03 % nasal spray ipratropium bromide 0.03 % nasal spray   USE 2 SPRAY(S) IN EACH NOSTRIL THREE TIMES DAILY AS NEEDED   • levothyroxine (SYNTHROID, LEVOTHROID) 75 MCG tablet Daily   • nateglinide (STARLIX) 120 mg, Oral, 3 Times Daily Before Meals   • Nutritional Supplements (GLUCERNA 1.5 MUSHTAQ PO) 2 times daily   • Omeprazole 20 MG tablet delayed-release As Needed   • pravastatin (PRAVACHOL) 20 MG tablet Daily   • vitamin D (ERGOCALCIFEROL) 50,000 Units, Every 30 Days       Assessment / Plan      Assessment & Plan:  1. Type 2 diabetes mellitus without complication, without long-term current use of insulin (CMS/[facility])  A1c okay.  No known hypoglycemia.  Continue nateglinide.  Advised to call if any questions or concerns before next visit.  - POC Glycosylated Hemoglobin (Hb A1C)  - nateglinide (STARLIX) 120 MG tablet; Take 1 tablet by mouth 3 (Three) Times a Day Before Meals.  Dispense: 90 tablet; Refill: 11  - Contour Test test strip; Testing 1 time per day; E11.9  Dispense: 100 each; Refill: 3      Return in about 6 months (around  10/12/2021) for Next scheduled follow up.     TAMARA Tejada  Endocrinology  04/12/2021

## 2021-04-29 ENCOUNTER — HOSPITAL ENCOUNTER (OUTPATIENT)
Dept: PHYSICAL THERAPY | Facility: HOSPITAL | Age: 76
Setting detail: THERAPIES SERIES
Discharge: HOME | End: 2021-04-29
Payer: MEDICARE

## 2021-04-29 PROCEDURE — 97161 PT EVAL LOW COMPLEX 20 MIN: CPT

## 2021-04-29 PROCEDURE — 97140 MANUAL THERAPY 1/> REGIONS: CPT

## 2021-04-29 PROCEDURE — 97110 THERAPEUTIC EXERCISES: CPT

## 2021-04-29 ASSESSMENT — PAIN DESCRIPTION - DESCRIPTORS: DESCRIPTORS: ACHING;SORE;DULL

## 2021-04-29 NOTE — PROGRESS NOTES
Physical Therapy  Initial Assessment  Date: 2021  Patient Name: French Jacobson  MRN: [id number]  : 1945     Treatment Diagnosis: Cervical pain, headaches, strain/sprain; poor posture; s/p oral-maxillary surgery for cancer    Restrictions  Restrictions/Precautions  Restrictions/Precautions: General Precautions  Required Braces or Orthoses?: No  Position Activity Restriction  Other position/activity restrictions: NO ELECTRICAL STIMULATION OR ULTRASOUND    Subjective   General  Chart Reviewed: Yes  Patient assessed for rehabilitation services?: Yes  Response To Previous Treatment: Not applicable  Family / Caregiver Present: No  Referring Practitioner: Gonzalez Reece DMD, MD  Diagnosis: Neck pain  Follows Commands: Within Functional Limits  General Comment  Comments: States she always has pain, ranges from 4-10/10  PT Visit Information  PT Insurance Information: Medicare, 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; noting that over the course of the past year so so, she has had a progression of forward head posture, neck pain, muscle pain, and headaches at suboccipital region; denies radiculopathy or neurologic symptoms; no prior PT or treatment for neck pain  Pain Screening  Patient Currently in Pain: Yes  Vital Signs  Patient Currently in Pain: Yes    Vision/Hearing  Vision  Vision: Within Functional Limits  Hearing  Hearing: Within functional limits    Orientation  Orientation  Overall Orientation Status: Within Normal Limits    Social/Functional History  Social/Functional History  Lives With: Spouse  Type of Home: House  Active : Yes  Mode of Transportation: Car    Objective     Observation/Palpation  Posture: Fair  Palpation: (+) tenderness wtih increased muscle tension over cervical paraspinals, UT's, MT's, SCM  Observation: moderate forward head posture; no edema or discoloration; appears shortening of anterior cervical musculature    AROM RUE (degrees)  RUE AROM : WFL  AROM LUE (degrees)  LUE AROM : WFL  Spine  Cervical: AROM: FF  WFL. BB = -50%, ROT = 25%, SB = -25%  Special Tests: (+) cervical quadrants for localized pain    Strength RUE  Strength RUE: WFL  Strength LUE  Strength LUE: WFL  Tone RLE  RLE Tone: Normotonic  Tone LLE  LLE Tone: Normotonic  Motor Control  Gross Motor?: WFL  Additional Measures  Other: Neck Index = 46  Sensation  Overall Sensation Status: WFL                   Exercises  Exercise 1: scap squeezes - 3 x 10  Exercise 2: gentle chin tucks with posture correction - 3 x 10  Exercise 3: cervical isometrics: SB, BB = 3\" x 10 each  Exercise 4: MH to UT's  Exercise 5: Manual: STM UT's, MT's, cervical paraspinals                      Assessment   Conditions Requiring Skilled Therapeutic Intervention  Body structures, Functions, Activity limitations: Increased pain;Decreased ROM; Decreased posture;Decreased high-level IADLs  Assessment: Cervical pain, headaches, strain/sprain; poor posture; s/p oral-maxillary surgery for cancer; patient will benefit from PT to help reduce pain, muscle strain and tension, improve ROM, and restore improved quality of life and functional capacity  Treatment Diagnosis: Cervical pain, headaches, strain/sprain; poor posture; s/p oral-maxillary surgery for cancer  Prognosis: Good  Decision Making: Medium Complexity  REQUIRES PT FOLLOW UP: Yes  Treatment Initiated : Evaluation, moist heat, manual therapy, posture education, POC review  Activity Tolerance  Activity Tolerance: Patient Tolerated treatment well         Plan   Plan  Times per week: 2-3 x week  Plan weeks: 6-8 weeks  Current Treatment Recommendations: Strengthening, Neuromuscular Re-education, Home Exercise Program, Manual Therapy - Soft Tissue Mobilization, Manual Therapy - Joint Manipulation, Modalities, ADL/Self-care Training, Pain Management, Positioning               Goals  Short term goals  Time Frame for Short term goals: 2 weeks  Short term goal 1: Patient to report a 10% decrease in neck pain average intensity. Short term goal 2: Patient to be independent with HEP. Long term goals  Time Frame for Long term goals : 6-8 weeks  Long term goal 1: Patient to report a 75% decrease in neck pain on a daily basis, with performance of ADL's and I-ADL's. Long term goal 2: Patient to achieve a sleeping pattern of no more than 1 hour of disturbance due to neck pain. Long term goal 3: Patient achieve ability to perform basic houswork and personal care tasks with 75% less pain, and reported minimal or less difficulty. Long term goal 4: Patient to report a 75% decrease in headaches, allowing improved concentration, sleep, and higher quality of living. Therapy Time   Individual Concurrent Group Co-treatment   Time In           Time Out           Minutes                   Radha Delong, PT       Certification of Medical Necessity: It will be understood that this treatment plan is certified medically necessary by the documenting therapist and referring physician mentioned in this report. Unless the physician indicated otherwise through written correspondence with our office, all further referrals will act as certification of medical necessity on this treatment plan. Thank you for this referral.  If you have questions regarding this plan of care, please call [phone].           Revisions to this plan (optional):                     Please sign and return this plan to:   FAX: [phone]      Signature:                                 Date:

## 2021-05-03 ENCOUNTER — HOSPITAL ENCOUNTER (OUTPATIENT)
Dept: PHYSICAL THERAPY | Facility: HOSPITAL | Age: 76
Setting detail: THERAPIES SERIES
Discharge: HOME | End: 2021-05-03
Payer: MEDICARE

## 2021-05-03 PROCEDURE — 97110 THERAPEUTIC EXERCISES: CPT

## 2021-05-03 PROCEDURE — 97140 MANUAL THERAPY 1/> REGIONS: CPT

## 2021-05-03 NOTE — FLOWSHEET NOTE
Physical Therapy Daily Treatment Note   Date:  5/3/2021    TIme In:           56           Time Out:1423    Patient Name:  Reed Rosa Maria    :  1945  MRN: [id number]    Restrictions/Precautions:  General precautions  Pertinent Medical History:  Medical/Treatment Diagnosis Information:  Cervical pain, headaches, strain/sprain; poor posture; s/p oral-maxillary surgery for cancer  ·    ·    Insurance/Certification information: Medicare, [facility], [facility] [address]    Physician Information:   Haven Holt DMD, MD  Plan of care signed (Y/N):  Yes  Visit# / total visits:   2  /    G-Code (if applicable):      Date / Visit # G-Code Applied:         Progress Note: []  Yes  [x]  No  Next due by: Visit #10      Pain level:   5/10  Subjective:      Objective:   Observation:    Test measurements:     Palpation:    Exercises:  Exercise Resistance/Repetitions Other comments   Scap squeezes 3 x 10 3   Gentle chin tucks with posture correction 3 x 10 3   Cervical Isometrics SB, BB 3 x 10 3   SCM stretch - B 15\" x 5 each 3                                             Other Therapeutic Activities:      Manual Treatments:  STM to bilateral traps and T-spine paraspinals, patient seated; to decrease pain and increase flexibility. Modalities:        Timed Code Treatment Minutes:  45      Total Treatment Minutes:  50    Treatment/Activity Tolerance:  [x] Patient tolerated treatment well [] Patient limited by fatigue  [] Patient limited by pain  [] Patient limited by other medical complications  [] Other:     Pain after treatment:      0/10    Prognosis: [x] Good [] Fair  [] Poor    Patient Requires Follow-up: [x] Yes  [] No    Plan:   [x] Continue per plan of care [] Alter current plan (see comments)  [] Plan of care initiated [] Hold pending MD visit [] Discharge    Plan for Next Session:        Electronically signed by:   Jono Hinds PTA

## 2021-05-07 ENCOUNTER — HOSPITAL ENCOUNTER (OUTPATIENT)
Dept: PHYSICAL THERAPY | Facility: HOSPITAL | Age: 76
Setting detail: THERAPIES SERIES
Discharge: HOME | End: 2021-05-07
Payer: MEDICARE

## 2021-05-07 PROCEDURE — 97140 MANUAL THERAPY 1/> REGIONS: CPT

## 2021-05-07 PROCEDURE — 97110 THERAPEUTIC EXERCISES: CPT

## 2021-05-07 PROCEDURE — 97150 GROUP THERAPEUTIC PROCEDURES: CPT

## 2021-05-07 NOTE — FLOWSHEET NOTE
Physical Therapy Daily Treatment Note   Date:  2021    TIme In:          1001            Time Out:   1119    Patient Name:  Bryan Dove    :  1945  MRN: [id number]    Restrictions/Precautions:  General precautions; NO ELECTRICAL STIM OR ULTRASOUND  Pertinent Medical History:  Medical/Treatment Diagnosis Information:  Cervical pain, headaches, strain/sprain; poor posture; s/p oral-maxillary surgery for cancer        Insurance/Certification information: Medicare, [facility], [address]    Physician Information:   Lynda Chung DMD, MD  Plan of care signed (Y/N):  Yes  Visit# / total visits:   3  /    G-Code (if applicable):      Date / Visit # G-Code Applied:         Progress Note: []  Yes  [x]  No  Next due by: Visit #10      Pain level:   5/10    Subjective:   Patient reports: no changes to note; doing HEP.      Objective:   Observation:    Test measurements:     Palpation:    Exercises:  Exercise Resistance/Repetitions Other comments   Scap squeezes 3 x 10 7   Gentle chin tucks with posture correction 3 x 10 7   Cervical Isometrics SB, BB 3 x 10 7   SCM stretch - B 15\" x 5 each 7                                             Other Therapeutic Activities:      Manual Treatments:  STM to bilateral traps and T-spine paraspinals, patient seated -     Modalities:  Moist heat to UT's x 10-15'      Timed Code Treatment Minutes:  39'      Total Treatment Minutes:   66'    Treatment/Activity Tolerance:  [x] Patient tolerated treatment well [] Patient limited by fatigue  [] Patient limited by pain  [] Patient limited by other medical complications  [] Other:     Pain after treatment:      4/10    Prognosis: [x] Good [] Fair  [] Poor    Patient Requires Follow-up: [x] Yes  [] No    Plan:   [x] Continue per plan of care [] Alter current plan (see comments)  [] Plan of care initiated [] Hold pending MD visit [] Discharge    Plan for Next Session:        Electronically signed by:  Coleen Gallegos PT

## 2021-05-11 ENCOUNTER — HOSPITAL ENCOUNTER (OUTPATIENT)
Dept: PHYSICAL THERAPY | Facility: HOSPITAL | Age: 76
Setting detail: THERAPIES SERIES
Discharge: HOME | End: 2021-05-11
Payer: MEDICARE

## 2021-05-11 PROCEDURE — 97140 MANUAL THERAPY 1/> REGIONS: CPT

## 2021-05-11 PROCEDURE — 97110 THERAPEUTIC EXERCISES: CPT

## 2021-05-11 NOTE — FLOWSHEET NOTE
Physical Therapy Daily Treatment Note   Date:  2021    TIme In:          1330            Time Out:   1    Patient Name:  Ed Schroeder    :  1945  MRN: [id number]    Restrictions/Precautions:  General precautions; NO ELECTRICAL STIM OR ULTRASOUND  Pertinent Medical History:  Medical/Treatment Diagnosis Information:  Cervical pain, headaches, strain/sprain; poor posture; s/p oral-maxillary surgery for cancer        Insurance/Certification information: Medicare, [facility], [address]    Physician Information:   Ej Barclay, JOSÉ MIGUEL, MD  Plan of care signed (Y/N):  Yes  Visit# / total visits:   4  /    G-Code (if applicable):      Date / Visit # G-Code Applied:         Progress Note: []  Yes  [x]  No  Next due by: Visit #10      Pain level:   4 /10    Subjective:   Patient reports: no changes to note; doing HEP.      Objective:   Observation:    Test measurements:     Palpation:    Exercises:  Exercise Resistance/Repetitions Other comments   Scap squeezes 3 x 10 11   Gentle chin tucks with posture correction 3 x 10 11   Cervical Isometrics SB, BB 3 x 10 11   SCM stretch - B 15\" x 5 each 11                                             Other Therapeutic Activities:      Manual Treatments:  STM to bilateral traps and T-spine paraspinals, patient seated -     Modalities:  Moist heat to UT's x 10'      Timed Code Treatment Minutes: 36 '      Total Treatment Minutes:  47 '    Treatment/Activity Tolerance:  [x] Patient tolerated treatment well [] Patient limited by fatigue  [] Patient limited by pain  [] Patient limited by other medical complications  [] Other:     Pain after treatment:    2  /10    Prognosis: [x] Good [] Fair  [] Poor    Patient Requires Follow-up: [x] Yes  [] No    Plan:   [x] Continue per plan of care [] Alter current plan (see comments)  [] Plan of care initiated [] Hold pending MD visit [] Discharge    Plan for Next Session:        Electronically signed by:  Radha Delong PT

## 2021-05-13 ENCOUNTER — HOSPITAL ENCOUNTER (OUTPATIENT)
Dept: PHYSICAL THERAPY | Facility: HOSPITAL | Age: 76
Setting detail: THERAPIES SERIES
Discharge: HOME | End: 2021-05-13
Payer: MEDICARE

## 2021-05-13 PROCEDURE — 97140 MANUAL THERAPY 1/> REGIONS: CPT

## 2021-05-13 PROCEDURE — 97110 THERAPEUTIC EXERCISES: CPT

## 2021-05-13 NOTE — FLOWSHEET NOTE
Physical Therapy Daily Treatment Note   Date:  2021    TIme In:    1329                  Time Out:   1434    Patient Name:  Lennox Pons    :  1945  MRN: [id number]    Restrictions/Precautions:  General precautions; NO ELECTRICAL STIM OR ULTRASOUND  Pertinent Medical History:  Medical/Treatment Diagnosis Information:  Cervical pain, headaches, strain/sprain; poor posture; s/p oral-maxillary surgery for cancer        Insurance/Certification information: Medicare, [facility], [address]    Physician Information:   Rafat Mg DMD, MD  Plan of care signed (Y/N):  Yes  Visit# / total visits:   5  /    G-Code (if applicable):      Date / Visit # G-Code Applied:         Progress Note: []  Yes  [x]  No  Next due by: Visit #10      Pain level:  5  /10    Subjective:   Patient reports: no changes to note; doing HEP.      Objective:   Observation:    Test measurements:     Palpation:    Exercises:  Exercise Resistance/Repetitions Other comments   Scap squeezes 3 x 10 13   Gentle chin tucks with posture correction 3 x 10 13   Cervical Isometrics SB, BB 3 x 10 13   SCM stretch - B 15\" x 5 each 13                                             Other Therapeutic Activities:      Manual Treatments:  STM to bilateral traps and T-spine paraspinals, patient seated - 15'    Modalities:  Moist heat to UT's x 10'      Timed Code Treatment Minutes: 36 '      Total Treatment Minutes:  72 '    Treatment/Activity Tolerance:  [x] Patient tolerated treatment well [] Patient limited by fatigue  [] Patient limited by pain  [] Patient limited by other medical complications  [] Other:     Pain after treatment:    4  /10    Prognosis: [x] Good [] Fair  [] Poor    Patient Requires Follow-up: [x] Yes  [] No    Plan:   [x] Continue per plan of care [] Alter current plan (see comments)  [] Plan of care initiated [] Hold pending MD visit [] Discharge    Plan for Next Session:        Electronically signed by:  Jen Lieberman PT

## 2021-05-18 ENCOUNTER — HOSPITAL ENCOUNTER (OUTPATIENT)
Dept: PHYSICAL THERAPY | Facility: HOSPITAL | Age: 76
Setting detail: THERAPIES SERIES
Discharge: HOME | End: 2021-05-18
Payer: MEDICARE

## 2021-05-18 PROCEDURE — 97110 THERAPEUTIC EXERCISES: CPT

## 2021-05-18 PROCEDURE — 97140 MANUAL THERAPY 1/> REGIONS: CPT

## 2021-05-18 NOTE — FLOWSHEET NOTE
Physical Therapy Daily Treatment Note   Date:  2021    TIme In:    7608                  Time Out:   1422    Patient Name:  Lyn Ordonez    :  1945  MRN: [id number]    Restrictions/Precautions:  General precautions; NO ELECTRICAL STIM OR ULTRASOUND  Pertinent Medical History:  Medical/Treatment Diagnosis Information:  Cervical pain, headaches, strain/sprain; poor posture; s/p oral-maxillary surgery for cancer        Insurance/Certification information: Medicare, [facility], [address]    Physician Information:   Luca Browning DMD, MD  Plan of care signed (Y/N):  Yes  Visit# / total visits:   6  /    G-Code (if applicable):      Date / Visit # G-Code Applied:         Progress Note: []  Yes  [x]  No  Next due by: Visit #10      Pain level:  3 /10    Subjective:   Patient reports: she has been having muscle spasms at neck when trying to sleep, causing sleep disturbance; doing HEP.      Objective:   Observation:    Test measurements:     Palpation:    Exercises:  Exercise Resistance/Repetitions Other comments   Scap squeezes 3 x 10 18   Gentle chin tucks with posture correction 3 x 10 18   Cervical Isometrics SB, BB 3 x 10 18   SCM stretch - B 15\" x 5 each 18                                             Other Therapeutic Activities:      Manual Treatments:  STM to bilateral traps and T-spine paraspinals, patient seated - 15'    Modalities:  Moist heat to UT's x 10'      Timed Code Treatment Minutes:   36 '      Total Treatment Minutes:  54 '    Treatment/Activity Tolerance:  [x] Patient tolerated treatment well [] Patient limited by fatigue  [] Patient limited by pain  [] Patient limited by other medical complications  [] Other:     Pain after treatment:    2  /10    Prognosis: [x] Good [] Fair  [] Poor    Patient Requires Follow-up: [x] Yes  [] No    Plan:   [x] Continue per plan of care [] Alter current plan (see comments)  [] Plan of care initiated [] Hold pending MD visit [] Discharge    Plan for Next Session:        Electronically signed by:  Yudith España PT

## 2021-05-20 ENCOUNTER — HOSPITAL ENCOUNTER (OUTPATIENT)
Dept: PHYSICAL THERAPY | Facility: HOSPITAL | Age: 76
Setting detail: THERAPIES SERIES
Discharge: HOME | End: 2021-05-20
Payer: MEDICARE

## 2021-05-20 PROCEDURE — 97140 MANUAL THERAPY 1/> REGIONS: CPT

## 2021-05-20 PROCEDURE — 97110 THERAPEUTIC EXERCISES: CPT

## 2021-05-20 NOTE — FLOWSHEET NOTE
Physical Therapy Daily Treatment Note   Date:  2021    TIme In:    1329                  Time Out:   18    Patient Name:  Chrissie Jones    :  1945  MRN: [id number]    Restrictions/Precautions:  General precautions; NO ELECTRICAL STIM OR ULTRASOUND  Pertinent Medical History:  Medical/Treatment Diagnosis Information:  Cervical pain, headaches, strain/sprain; poor posture; s/p oral-maxillary surgery for cancer        Insurance/Certification information: Medicare, [facility], [facility]    Physician Information:   Nabil Hicks DMD, MD  Plan of care signed (Y/N):  Yes  Visit# / total visits:   7  /    G-Code (if applicable):      Date / Visit # G-Code Applied:         Progress Note: []  Yes  [x]  No  Next due by: Visit #10      Pain level:  3 10    Subjective:   Patient reports: feeling better; did not have spasms since last tx; doing HEP.      Objective:   Observation:    Test measurements:     Palpation:    Exercises:  Exercise Resistance/Repetitions Other comments   Scap squeezes 3 x 10 20   Gentle chin tucks with posture correction 3 x 10 20   Cervical Isometrics SB, BB 3 x 10 20   SCM stretch - B 15\" x 5 each                                              Other Therapeutic Activities:      Manual Treatments:  STM to bilateral traps and T-spine paraspinals, patient seated - 15'    Modalities:  Moist heat to UT's x 10'      Timed Code Treatment Minutes:   36 '      Total Treatment Minutes:  62 '    Treatment/Activity Tolerance:  [x] Patient tolerated treatment well [] Patient limited by fatigue  [] Patient limited by pain  [] Patient limited by other medical complications  [] Other:     Pain after treatment:    0  /10    Prognosis: [x] Good [] Fair  [] Poor    Patient Requires Follow-up: [x] Yes  [] No    Plan:   [x] Continue per plan of care [] Alter current plan (see comments)  [] Plan of care initiated [] Hold pending MD visit [] Discharge    Plan for Next Session:        Electronically signed by:  Marisa Delatorre Antionette Hernandez, PT

## 2021-05-25 ENCOUNTER — HOSPITAL ENCOUNTER (OUTPATIENT)
Dept: PHYSICAL THERAPY | Facility: HOSPITAL | Age: 76
Setting detail: THERAPIES SERIES
Discharge: HOME | End: 2021-05-25
Payer: MEDICARE

## 2021-05-25 PROCEDURE — 97110 THERAPEUTIC EXERCISES: CPT

## 2021-05-25 PROCEDURE — 97140 MANUAL THERAPY 1/> REGIONS: CPT

## 2021-05-25 NOTE — FLOWSHEET NOTE
Physical Therapy Treatment / Reassessment Note   Date:  2021    TIme In:    1568                  Time Out:   519    Patient Name:  Duglas Arreaga    :  1945  MRN: [id number]    Restrictions/Precautions:  General precautions; NO ELECTRICAL STIM OR ULTRASOUND  Pertinent Medical History:  Medical/Treatment Diagnosis Information:  Cervical pain, headaches, strain/sprain; poor posture; s/p oral-maxillary surgery for cancer        Insurance/Certification information: Medicare, [facility], [address]    Physician Information:   Jessica Rodriguez DMD, MD  Plan of care signed (Y/N):  Yes  Visit# / total visits:   8  /    G-Code (if applicable):      Date / Visit # G-Code Applied:         Progress Note: []  Yes  [x]  No  Next due by: Visit #10      Pain level:  3 /10    Subjective:   Patient reports: feeling better; did not have spasms since last tx; doing HEP. Objective:   Observation:    Test measurements:  Neck Index = 46   Palpation:    Exercises:  Exercise Resistance/Repetitions Other comments   Scap squeezes 3 x 10 25   Gentle chin tucks with posture correction 3 x 10 25   Cervical Isometrics SB, BB 3 x 10 25   SCM stretch - B 15\" x 5 each 25                                             Other Therapeutic Activities:      Manual Treatments:  STM to bilateral traps and T-spine paraspinals, patient seated - 15'    Modalities:  Moist heat to UT's x 10'      Timed Code Treatment Minutes:   36 '      Total Treatment Minutes:   59'    Treatment/Activity Tolerance:  [x] Patient tolerated treatment well [] Patient limited by fatigue  [] Patient limited by pain  [] Patient limited by other medical complications  [] Other:     Pain after treatment:    2 /10    Prognosis: [x] Good [] Fair  [] Poor    Goals  Short term goals  Time Frame for Short term goals: 2 weeks  Short term goal 1: Patient to report a 10% decrease in neck pain average intensity. - met  Short term goal 2: Patient to be independent with HEP.  - met  Long term goals  Time Frame for Long term goals : 6-8 weeks  Long term goal 1: Patient to report a 75% decrease in neck pain on a daily basis, with performance of ADL's and I-ADL's. Long term goal 2: Patient to achieve a sleeping pattern of no more than 1 hour of disturbance due to neck pain. Long term goal 3: Patient achieve ability to perform basic houswork and personal care tasks with 75% less pain, and reported minimal or less difficulty. Long term goal 4: Patient to report a 75% decrease in headaches, allowing improved concentration, sleep, and higher quality of living. Patient is responding slowly, but positively to current tx; she has met STG's; progress is anticipated to be gradual in nature due to patient's condition.        Patient Requires Follow-up: [x] Yes  [] No    Plan:   [x] Continue per plan of care [] Alter current plan (see comments)  [] Plan of care initiated [] Hold pending MD visit [] Discharge    Plan for Next Session:        Electronically signed by:  Karla Mccoy, PT

## 2021-05-27 ENCOUNTER — HOSPITAL ENCOUNTER (OUTPATIENT)
Dept: PHYSICAL THERAPY | Facility: HOSPITAL | Age: 76
Setting detail: THERAPIES SERIES
Discharge: HOME | End: 2021-05-27
Payer: MEDICARE

## 2021-05-27 PROCEDURE — 97110 THERAPEUTIC EXERCISES: CPT

## 2021-05-27 PROCEDURE — 97140 MANUAL THERAPY 1/> REGIONS: CPT

## 2021-05-27 NOTE — FLOWSHEET NOTE
Physical Therapy Treatment Note   Date:  2021    TIme In:                      Time Out:   [place]    Patient Name:  Kole Cardona    :  1945  MRN: [id number]    Restrictions/Precautions:  General precautions; NO ELECTRICAL STIM OR ULTRASOUND  Pertinent Medical History:  Medical/Treatment Diagnosis Information:  Cervical pain, headaches, strain/sprain; poor posture; s/p oral-maxillary surgery for cancer        Insurance/Certification information: Medicare, [facility], [address]    Physician Information:   Damian Collier DMD, MD  Plan of care signed (Y/N):  Yes  Visit# / total visits:   9  /    G-Code (if applicable):      Date / Visit # G-Code Applied:         Progress Note: []  Yes  [x]  No  Next due by: Visit #10      Pain level:  3 /10    Subjective:   Patient reports: feeling better; did not have spasms since last tx; doing HEP. Objective:   Observation:    Test measurements:  Neck Index = 46   Palpation:    Exercises:  Exercise Resistance/Repetitions Other comments   Scap squeezes 3 x 10 27   Gentle chin tucks with posture correction 3 x 10 27   Cervical Isometrics SB, BB 3 x 10 27   SCM stretch - B 15\" x 5 each 27                                             Other Therapeutic Activities:      Manual Treatments:  STM to bilateral traps and T-spine paraspinals, patient seated - 15'    Modalities:  Moist heat to UT's x 10'      Timed Code Treatment Minutes:   36 '      Total Treatment Minutes:   64'    Treatment/Activity Tolerance:  [x] Patient tolerated treatment well [] Patient limited by fatigue  [] Patient limited by pain  [] Patient limited by other medical complications  [] Other:     Pain after treatment:    2 /10    Prognosis: [x] Good [] Fair  [] Poor    Goals  Short term goals  Time Frame for Short term goals: 2 weeks  Short term goal 1: Patient to report a 10% decrease in neck pain average intensity. - met  Short term goal 2: Patient to be independent with HEP.  - met  Long term goals  Time Frame for Long term goals : 6-8 weeks  Long term goal 1: Patient to report a 75% decrease in neck pain on a daily basis, with performance of ADL's and I-ADL's. Long term goal 2: Patient to achieve a sleeping pattern of no more than 1 hour of disturbance due to neck pain. Long term goal 3: Patient achieve ability to perform basic houswork and personal care tasks with 75% less pain, and reported minimal or less difficulty. Long term goal 4: Patient to report a 75% decrease in headaches, allowing improved concentration, sleep, and higher quality of living. Patient is responding slowly, but positively to current tx; she has met STG's; progress is anticipated to be gradual in nature due to patient's condition.        Patient Requires Follow-up: [x] Yes  [] No    Plan:   [x] Continue per plan of care [] Alter current plan (see comments)  [] Plan of care initiated [] Hold pending MD visit [] Discharge    Plan for Next Session:        Electronically signed by:  Kathi Allan, PT

## 2021-06-01 ENCOUNTER — APPOINTMENT (OUTPATIENT)
Dept: PHYSICAL THERAPY | Facility: HOSPITAL | Age: 76
End: 2021-06-01
Payer: MEDICARE

## 2021-06-03 ENCOUNTER — HOSPITAL ENCOUNTER (OUTPATIENT)
Dept: PHYSICAL THERAPY | Facility: HOSPITAL | Age: 76
Setting detail: THERAPIES SERIES
Discharge: HOME | End: 2021-06-03
Payer: MEDICARE

## 2021-06-03 PROCEDURE — 97110 THERAPEUTIC EXERCISES: CPT

## 2021-06-03 PROCEDURE — 97140 MANUAL THERAPY 1/> REGIONS: CPT

## 2021-06-03 NOTE — FLOWSHEET NOTE
Physical Therapy Treatment Note   Date:  6/3/2021    TIme In:     4021                 Time Out:   1413    Patient Name:  Josh Arriaza    :  1945  MRN: [id number]    Restrictions/Precautions:  General precautions; NO ELECTRICAL STIM OR ULTRASOUND  Pertinent Medical History:  Medical/Treatment Diagnosis Information:    Cervical pain, headaches, strain/sprain; poor posture; s/p oral-maxillary surgery for cancer        Insurance/Certification information: Medicare, [facility], [address]    Physician Information:   Alejandro Mayer DMD, MD  Plan of care signed (Y/N):  Yes  Visit# / total visits:   10 /    G-Code (if applicable):      Date / Visit # G-Code Applied:         Progress Note: []  Yes  [x]  No  Next due by: Visit #10      Pain level:   6/10    Subjective:   Patient reports her neck has really been bothering her and she couldn't sleep at all last night. Objective:   Observation:    Test measurements:  Neck Index = 46   Palpation:    Exercises:  Exercise Resistance/Repetitions Other comments   Scap squeezes 3 x 10 3   Gentle chin tucks with posture correction 3 x 10 3   Cervical Isometrics SB, BB 3 x 10 3   SCM stretch - B 15\" x 5 each 3                                             Other Therapeutic Activities:      Manual Treatments:  STM to bilateral traps and T-spine paraspinals, patient seated - 15'    Modalities:  Moist heat to UT's x 10'      Timed Code Treatment Minutes:   35      Total Treatment Minutes:   46    Treatment/Activity Tolerance:  [x] Patient tolerated treatment well [] Patient limited by fatigue  [] Patient limited by pain  [] Patient limited by other medical complications  [x] Other: Pt completed tx with decreased pain but has mod to severe muscle tension throughout c-spine.     Pain after treatment:     3/10    Prognosis: [x] Good [] Fair  [] Poor    Goals  Short term goals  Time Frame for Short term goals: 2 weeks  Short term goal 1: Patient to report a 10% decrease in neck pain

## 2021-06-08 ENCOUNTER — HOSPITAL ENCOUNTER (OUTPATIENT)
Dept: PHYSICAL THERAPY | Facility: HOSPITAL | Age: 76
Setting detail: THERAPIES SERIES
Discharge: HOME | End: 2021-06-08
Payer: MEDICARE

## 2021-06-08 PROCEDURE — 97110 THERAPEUTIC EXERCISES: CPT

## 2021-06-08 PROCEDURE — 97140 MANUAL THERAPY 1/> REGIONS: CPT

## 2021-06-10 ENCOUNTER — HOSPITAL ENCOUNTER (OUTPATIENT)
Dept: PHYSICAL THERAPY | Facility: HOSPITAL | Age: 76
Setting detail: THERAPIES SERIES
Discharge: HOME | End: 2021-06-10
Payer: MEDICARE

## 2021-06-10 PROCEDURE — 97150 GROUP THERAPEUTIC PROCEDURES: CPT

## 2021-06-10 PROCEDURE — 97140 MANUAL THERAPY 1/> REGIONS: CPT

## 2021-06-10 NOTE — FLOWSHEET NOTE
Physical Therapy Treatment Note   Date:  6/10/2021    TIme In:     1329                 Time Out:   4075    Patient Name:  Britany Kingsley    :  1945  MRN: [id number]    Restrictions/Precautions:  General precautions; NO ELECTRICAL STIM OR ULTRASOUND  Pertinent Medical History:  Medical/Treatment Diagnosis Information:    Cervical pain, headaches, strain/sprain; poor posture; s/p oral-maxillary surgery for cancer        Insurance/Certification information: Medicare, [facility], [address]    Physician Information:   Jens Connor DMD, MD  Plan of care signed (Y/N):  Yes  Visit# / total visits:   12 /    G-Code (if applicable):      Date / Visit # G-Code Applied:         Progress Note: []  Yes  [x]  No  Next due by: Visit #10      Pain level:   4 /10    Subjective:   Patient reports: worse pain in neck this week. Objective:   Observation:    Test measurements:  Neck Index = 46   Palpation:    Exercises:  Exercise Resistance/Repetitions Other comments   Scap squeezes 3 x 10 10   Gentle chin tucks with posture correction 3 x 10 10   Cervical Isometrics SB, BB 3 x 10 10   SCM stretch - B 15\" x 5 each 10                                             Other Therapeutic Activities:      Manual Treatments:  STM to bilateral traps and T-spine paraspinals, patient seated - 20'    Modalities:  Moist heat to UT's x 10'      Timed Code Treatment Minutes:   20 + group      Total Treatment Minutes:   47'    Treatment/Activity Tolerance:  [x] Patient tolerated treatment well [] Patient limited by fatigue  [] Patient limited by pain  [] Patient limited by other medical complications  [x] Other: Pt completed tx with decreased pain but has mod to severe muscle tension throughout c-spine.     Pain after treatment:     0 /10    Prognosis: [x] Good [] Fair  [] Poor    Goals  Short term goals  Time Frame for Short term goals: 2 weeks  Short term goal 1: Patient to report a 10% decrease in neck pain average intensity. - met  Short term goal 2: Patient to be independent with HEP. - met  Long term goals  Time Frame for Long term goals : 6-8 weeks  Long term goal 1: Patient to report a 75% decrease in neck pain on a daily basis, with performance of ADL's and I-ADL's. Long term goal 2: Patient to achieve a sleeping pattern of no more than 1 hour of disturbance due to neck pain. Long term goal 3: Patient achieve ability to perform basic houswork and personal care tasks with 75% less pain, and reported minimal or less difficulty. Long term goal 4: Patient to report a 75% decrease in headaches, allowing improved concentration, sleep, and higher quality of living.       Patient Requires Follow-up: [x] Yes  [] No    Plan:   [x] Continue per plan of care [] Alter current plan (see comments)  [] Plan of care initiated [] Hold pending MD visit [] Discharge    Plan for Next Session:        Electronically signed by:  Adilene Kline PT

## 2021-06-15 ENCOUNTER — HOSPITAL ENCOUNTER (OUTPATIENT)
Dept: PHYSICAL THERAPY | Facility: HOSPITAL | Age: 76
Setting detail: THERAPIES SERIES
Discharge: HOME | End: 2021-06-15
Payer: MEDICARE

## 2021-06-15 PROCEDURE — 97140 MANUAL THERAPY 1/> REGIONS: CPT

## 2021-06-15 PROCEDURE — 97150 GROUP THERAPEUTIC PROCEDURES: CPT

## 2021-06-15 PROCEDURE — 97110 THERAPEUTIC EXERCISES: CPT

## 2021-06-15 NOTE — FLOWSHEET NOTE
10% decrease in neck pain average intensity. - met  Short term goal 2: Patient to be independent with HEP. - met  Long term goals  Time Frame for Long term goals : 6-8 weeks  Long term goal 1: Patient to report a 75% decrease in neck pain on a daily basis, with performance of ADL's and I-ADL's. Long term goal 2: Patient to achieve a sleeping pattern of no more than 1 hour of disturbance due to neck pain. Long term goal 3: Patient achieve ability to perform basic houswork and personal care tasks with 75% less pain, and reported minimal or less difficulty. Long term goal 4: Patient to report a 75% decrease in headaches, allowing improved concentration, sleep, and higher quality of living. Patient is responding well to current treatment.       Patient Requires Follow-up: [x] Yes  [] No    Plan:   [x] Continue per plan of care [] Alter current plan (see comments)  [] Plan of care initiated [] Hold pending MD visit [] Discharge    Plan for Next Session:        Electronically signed by:  Cinthia Alpers, PT

## 2021-06-17 ENCOUNTER — HOSPITAL ENCOUNTER (OUTPATIENT)
Dept: PHYSICAL THERAPY | Facility: HOSPITAL | Age: 76
Setting detail: THERAPIES SERIES
Discharge: HOME | End: 2021-06-17
Payer: MEDICARE

## 2021-06-17 PROCEDURE — 97150 GROUP THERAPEUTIC PROCEDURES: CPT

## 2021-06-17 PROCEDURE — 97140 MANUAL THERAPY 1/> REGIONS: CPT

## 2021-06-17 NOTE — FLOWSHEET NOTE
Physical Therapy Treatment Note   Date:  2021    TIme In:     5917                 Time Out:   1422    Patient Name:  Josh Arriaza    :  1945  MRN: [id number]    Restrictions/Precautions:  General precautions; NO ELECTRICAL STIM OR ULTRASOUND  Pertinent Medical History:  Medical/Treatment Diagnosis Information:    Cervical pain, headaches, strain/sprain; poor posture; s/p oral-maxillary surgery for cancer        Insurance/Certification information: Medicare, [facility], [address]    Physician Information:   Alejandro Mayer DMD, MD  Plan of care signed (Y/N):  Yes  Visit# / total visits:   15 /    G-Code (if applicable):      Date / Visit # G-Code Applied:         Progress Note: []  Yes  [x]  No  Next due by: Visit #10      Pain level:   3 /10    Subjective:   Patient reports: milder pain in neck today; states that after last treatment, she had no pain for several hours.      Objective:   Observation:    Test measurements:  Neck Index = 46   Palpation:    Exercises:  Exercise Resistance/Repetitions Other comments   Scap squeezes 3 x 10 15   Gentle chin tucks with posture correction 3 x 10 15   Cervical Isometrics SB, BB 3 x 10 15   SCM stretch - B 15\" x 5 each 15                                             Other Therapeutic Activities:      Manual Treatments:  STM to bilateral traps and T-spine paraspinals, patient seated - 20'    Modalities:  Moist heat to UT's x 10-15'      Timed Code Treatment Minutes:   20' + group      Total Treatment Minutes:   54    Treatment/Activity Tolerance:  [x] Patient tolerated treatment well [] Patient limited by fatigue  [] Patient limited by pain  [] Patient limited by other medical complications  [x] Other:     Pain after treatment:     0 /10    Prognosis: [x] Good [] Fair  [] Poor    Goals  Short term goals  Time Frame for Short term goals: 2 weeks  Short term goal 1: Patient to report a 10% decrease in neck pain average intensity. - met  Short term goal 2: Patient to be independent with HEP. - met  Long term goals  Time Frame for Long term goals : 6-8 weeks  Long term goal 1: Patient to report a 75% decrease in neck pain on a daily basis, with performance of ADL's and I-ADL's. Long term goal 2: Patient to achieve a sleeping pattern of no more than 1 hour of disturbance due to neck pain. Long term goal 3: Patient achieve ability to perform basic houswork and personal care tasks with 75% less pain, and reported minimal or less difficulty. Long term goal 4: Patient to report a 75% decrease in headaches, allowing improved concentration, sleep, and higher quality of living. Patient is responding well to current treatment.       Patient Requires Follow-up: [x] Yes  [] No    Plan:   [x] Continue per plan of care [] Alter current plan (see comments)  [] Plan of care initiated [] Hold pending MD visit [] Discharge    Plan for Next Session:        Electronically signed by:  Adrianna Pillai PT

## 2021-06-22 ENCOUNTER — HOSPITAL ENCOUNTER (OUTPATIENT)
Dept: PHYSICAL THERAPY | Facility: HOSPITAL | Age: 76
Setting detail: THERAPIES SERIES
Discharge: HOME | End: 2021-06-22
Payer: MEDICARE

## 2021-06-22 PROCEDURE — 97110 THERAPEUTIC EXERCISES: CPT

## 2021-06-22 PROCEDURE — 97140 MANUAL THERAPY 1/> REGIONS: CPT

## 2021-06-24 ENCOUNTER — HOSPITAL ENCOUNTER (OUTPATIENT)
Dept: PHYSICAL THERAPY | Facility: HOSPITAL | Age: 76
Setting detail: THERAPIES SERIES
Discharge: HOME | End: 2021-06-24
Payer: MEDICARE

## 2021-06-24 PROCEDURE — 97110 THERAPEUTIC EXERCISES: CPT

## 2021-06-24 PROCEDURE — 97140 MANUAL THERAPY 1/> REGIONS: CPT

## 2021-06-24 NOTE — FLOWSHEET NOTE
Physical Therapy Treatment Note   Date:  2021    TIme In:     8321                 Time Out:   [address]    Patient Name:  Xena Thompson    :  1945  MRN: [id number]    Restrictions/Precautions:  General precautions; NO ELECTRICAL STIM OR ULTRASOUND  Pertinent Medical History:  Medical/Treatment Diagnosis Information:    Cervical pain, headaches, strain/sprain; poor posture; s/p oral-maxillary surgery for cancer        Insurance/Certification information: Medicare, [facility], [facility]    Physician Information:   Julee Parrish DMD, MD  Plan of care signed (Y/N):  Yes  Visit# / total visits:   12 /    G-Code (if applicable):      Date / Visit # G-Code Applied:         Progress Note: []  Yes  [x]  No  Next due by: Visit #10      Pain level:   4 /10    Subjective:   Patient reports: moderate, worse pain in neck today; states she had an episode of anterior neck spasm this weekend causing he more pain and stress the neck     Objective:   Observation:    Test measurements:  Neck Index = 62   Palpation: (+) tenderness and increased muscle tension over scalenes, paraspinals, UT's.  MT's    Exercises:  Exercise Resistance/Repetitions Other comments   Scap squeezes 3 x 10 24   Gentle chin tucks with posture correction 3 x 10 24   Cervical Isometrics SB, BB 3 x 10 24   SCM stretch - B 15\" x 5 each 24                                             Other Therapeutic Activities:      Manual Treatments:  STM to bilateral traps and T-spine paraspinals, patient seated - 23'    Modalities:  Moist heat to UT's x 15' - to finish      Timed Code Treatment Minutes:   40'      Total Treatment Minutes:   48'    Treatment/Activity Tolerance:  [x] Patient tolerated treatment well [] Patient limited by fatigue  [] Patient limited by pain  [] Patient limited by other medical complications  [x] Other:     Pain after treatment:     2 /10    Prognosis: [x] Good [] Fair  [] Poor    Goals  Short term goals  Time Frame for Short term goals: 2

## 2021-06-29 ENCOUNTER — HOSPITAL ENCOUNTER (OUTPATIENT)
Dept: PHYSICAL THERAPY | Facility: HOSPITAL | Age: 76
Setting detail: THERAPIES SERIES
Discharge: HOME | End: 2021-06-29
Payer: MEDICARE

## 2021-06-29 PROCEDURE — 97140 MANUAL THERAPY 1/> REGIONS: CPT

## 2021-06-29 PROCEDURE — 97150 GROUP THERAPEUTIC PROCEDURES: CPT

## 2021-06-29 NOTE — FLOWSHEET NOTE
Physical Therapy Treatment Note   Date:  2021    TIme In:     1333                 Time Out:   1    Patient Name:  Luis Salmon    :  1945  MRN: [id number]    Restrictions/Precautions:  General precautions; NO ELECTRICAL STIM OR ULTRASOUND  Pertinent Medical History:  Medical/Treatment Diagnosis Information:    Cervical pain, headaches, strain/sprain; poor posture; s/p oral-maxillary surgery for cancer        Insurance/Certification information: Medicare, [facility], [address]    Physician Information:   Shelly Wade DMD, MD  Plan of care signed (Y/N):  Yes  Visit# / total visits:   16 /    G-Code (if applicable):      Date / Visit # G-Code Applied:         Progress Note: []  Yes  [x]  No  Next due by: Visit #10      Pain level:   3 /10    Subjective:   Patient reports: mild pain today; some better overall, no new c/o. Objective:   Observation:    Test measurements:  Neck Index = 62   Palpation: (+) tenderness and increased muscle tension over scalenes, paraspinals, UT's.  MT's    Exercises:  Exercise Resistance/Repetitions Other comments   Scap squeezes 3 x 10 29   Gentle chin tucks with posture correction 3 x 10 29   Cervical Isometrics SB, BB 3 x 10 29   SCM stretch - B 15\" x 5 each 29                                             Other Therapeutic Activities:      Manual Treatments:  STM to bilateral traps and T-spine paraspinals, patient seated - 20'    Modalities:  Moist heat to UT's x 15' - to finish      Timed Code Treatment Minutes:   Group + 20'      Total Treatment Minutes:    46 '    Treatment/Activity Tolerance:  [x] Patient tolerated treatment well [] Patient limited by fatigue  [] Patient limited by pain  [] Patient limited by other medical complications  [x] Other:     Pain after treatment:     1 /10    Prognosis: [x] Good [] Fair  [] Poor    Goals  Short term goals  Time Frame for Short term goals: 2 weeks  Short term goal 1: Patient to report a 10% decrease in neck pain average intensity. - met  Short term goal 2: Patient to be independent with HEP. - met  Long term goals  Time Frame for Long term goals : 6-8 weeks  Long term goal 1: Patient to report a 75% decrease in neck pain on a daily basis, with performance of ADL's and I-ADL's. Long term goal 2: Patient to achieve a sleeping pattern of no more than 1 hour of disturbance due to neck pain. Long term goal 3: Patient achieve ability to perform basic houswork and personal care tasks with 75% less pain, and reported minimal or less difficulty. Long term goal 4: Patient to report a 75% decrease in headaches, allowing improved concentration, sleep, and higher quality of living. Patient is responding well to current treatment; however, as noted above, she has experienced an exacerbation of her symptoms from the unexpected spasm; her pain levels overall have been less since beginning PT; she will benefit from continued treatment.       Patient Requires Follow-up: [x] Yes  [] No    Plan:   [x] Continue per plan of care [] Alter current plan (see comments)  [] Plan of care initiated [] Hold pending MD visit [] Discharge    Plan for Next Session:        Electronically signed by:  Kathi Allan, PT

## 2021-07-01 ENCOUNTER — HOSPITAL ENCOUNTER (OUTPATIENT)
Dept: PHYSICAL THERAPY | Facility: HOSPITAL | Age: 76
Setting detail: THERAPIES SERIES
Discharge: HOME | End: 2021-07-01
Payer: MEDICARE

## 2021-07-01 PROCEDURE — 97150 GROUP THERAPEUTIC PROCEDURES: CPT

## 2021-07-01 PROCEDURE — 97140 MANUAL THERAPY 1/> REGIONS: CPT

## 2021-07-01 NOTE — FLOWSHEET NOTE
Physical Therapy Treatment Note   Date:  2021    TIme In:     2878                 Time Out:   1434    Patient Name:  Carley Camarillo    :  1945  MRN: [id number]    Restrictions/Precautions:  General precautions; NO ELECTRICAL STIM OR ULTRASOUND  Pertinent Medical History:  Medical/Treatment Diagnosis Information:    Cervical pain, headaches, strain/sprain; poor posture; s/p oral-maxillary surgery for cancer        Insurance/Certification information: Medicare, [facility], [address]    Physician Information:   Fernando Christiansen DMD, MD  Plan of care signed (Y/N):  Yes  Visit# / total visits:   25 /    G-Code (if applicable):      Date / Visit # G-Code Applied:         Progress Note: []  Yes  [x]  No  Next due by: Visit #10      Pain level:   2 /10    Subjective:   Patient reports: mild pain today; some better overall, no new c/o. Objective:   Observation:    Test measurements:  Neck Index = 62   Palpation: (+) tenderness and increased muscle tension over scalenes, paraspinals, UT's. MT's    Exercises:  Exercise Resistance/Repetitions Other comments   Scap squeezes 3 x 10 1   Gentle chin tucks with posture correction 3 x 10 1   Cervical Isometrics SB, BB 3 x 10 1   SCM stretch - B 15\" x 5 each 1                                             Other Therapeutic Activities:      Manual Treatments:  STM to bilateral traps and T-spine paraspinals, patient seated - 24'    Modalities:  Moist heat to UT's x 15' - to finish      Timed Code Treatment Minutes:   Group + 24'      Total Treatment Minutes:    59 '    Treatment/Activity Tolerance:  [x] Patient tolerated treatment well [] Patient limited by fatigue  [] Patient limited by pain  [] Patient limited by other medical complications  [x] Other:     Pain after treatment:     0 /10    Prognosis: [x] Good [] Fair  [] Poor    Goals  Short term goals  Time Frame for Short term goals: 2 weeks  Short term goal 1: Patient to report a 10% decrease in neck pain average intensity. - met  Short term goal 2: Patient to be independent with HEP. - met  Long term goals  Time Frame for Long term goals : 6-8 weeks  Long term goal 1: Patient to report a 75% decrease in neck pain on a daily basis, with performance of ADL's and I-ADL's. Long term goal 2: Patient to achieve a sleeping pattern of no more than 1 hour of disturbance due to neck pain. Long term goal 3: Patient achieve ability to perform basic houswork and personal care tasks with 75% less pain, and reported minimal or less difficulty. Long term goal 4: Patient to report a 75% decrease in headaches, allowing improved concentration, sleep, and higher quality of living. Patient is responding well to current treatment; however, as noted above, she has experienced an exacerbation of her symptoms from the unexpected spasm; her pain levels overall have been less since beginning PT; she will benefit from continued treatment.       Patient Requires Follow-up: [x] Yes  [] No    Plan:   [x] Continue per plan of care [] Alter current plan (see comments)  [] Plan of care initiated [] Hold pending MD visit [] Discharge    Plan for Next Session:        Electronically signed by:  Kendrick Parekh PT

## 2021-07-05 ENCOUNTER — HOSPITAL ENCOUNTER (OUTPATIENT)
Facility: HOSPITAL | Age: 76
Discharge: HOME | End: 2021-07-05
Payer: MEDICARE

## 2021-07-05 LAB
ALBUMIN SERPL-MCNC: 4.1 G/DL (ref 3.4–4.8)
ANION GAP SERPL CALCULATED.3IONS-SCNC: 11 MMOL/L (ref 3–16)
BILIRUBIN URINE: NEGATIVE
BLOOD, URINE: NEGATIVE
BUN BLDV-MCNC: 28 MG/DL (ref 6–20)
CALCIUM SERPL-MCNC: 10.1 MG/DL (ref 8.5–10.5)
CHLORIDE BLD-SCNC: 101 MMOL/L (ref 98–107)
CLARITY: CLEAR
CO2: 23 MMOL/L (ref 20–30)
COLOR: YELLOW
CREAT SERPL-MCNC: 1.2 MG/DL (ref 0.4–1.2)
CREATININE URINE: 41.6 MG/DL (ref 28–259)
GFR AFRICAN AMERICAN: 53
GFR NON-AFRICAN AMERICAN: 44
GLUCOSE BLD-MCNC: 139 MG/DL (ref 74–106)
GLUCOSE URINE: NEGATIVE MG/DL
HBA1C MFR BLD: 7.4 %
KETONES, URINE: NEGATIVE MG/DL
LEUKOCYTE ESTERASE, URINE: NEGATIVE
MICROSCOPIC EXAMINATION: NORMAL
NITRITE, URINE: NEGATIVE
PARATHYROID HORMONE INTACT: 35 PG/ML (ref 14–72)
PH UA: 5.5 (ref 5–8)
PHOSPHORUS: 4.7 MG/DL (ref 2.5–4.5)
POTASSIUM SERPL-SCNC: 4.9 MMOL/L (ref 3.4–5.1)
PROTEIN PROTEIN: <4 MG/DL
PROTEIN UA: NEGATIVE MG/DL
PROTEIN/CREAT RATIO: NORMAL MG/DL
SODIUM BLD-SCNC: 135 MMOL/L (ref 136–145)
SPECIFIC GRAVITY UA: 1.01 (ref 1–1.03)
URIC ACID, SERUM: 4.2 MG/DL (ref 2.5–7.1)
URINE TYPE: NORMAL
UROBILINOGEN, URINE: 0.2 E.U./DL
VITAMIN D 25-HYDROXY: 20.2 (ref 32–100)

## 2021-07-05 PROCEDURE — 84550 ASSAY OF BLOOD/URIC ACID: CPT

## 2021-07-05 PROCEDURE — 83036 HEMOGLOBIN GLYCOSYLATED A1C: CPT

## 2021-07-05 PROCEDURE — 80069 RENAL FUNCTION PANEL: CPT

## 2021-07-05 PROCEDURE — 82306 VITAMIN D 25 HYDROXY: CPT

## 2021-07-05 PROCEDURE — 36415 COLL VENOUS BLD VENIPUNCTURE: CPT

## 2021-07-05 PROCEDURE — 84156 ASSAY OF PROTEIN URINE: CPT

## 2021-07-05 PROCEDURE — 82570 ASSAY OF URINE CREATININE: CPT

## 2021-07-05 PROCEDURE — 81003 URINALYSIS AUTO W/O SCOPE: CPT

## 2021-07-05 PROCEDURE — 83970 ASSAY OF PARATHORMONE: CPT

## 2021-07-06 ENCOUNTER — HOSPITAL ENCOUNTER (OUTPATIENT)
Dept: PHYSICAL THERAPY | Facility: HOSPITAL | Age: 76
Setting detail: THERAPIES SERIES
Discharge: HOME | End: 2021-07-06
Payer: MEDICARE

## 2021-07-06 PROCEDURE — 97140 MANUAL THERAPY 1/> REGIONS: CPT

## 2021-07-06 PROCEDURE — 97110 THERAPEUTIC EXERCISES: CPT

## 2021-07-06 NOTE — FLOWSHEET NOTE
Physical Therapy Treatment Note   Date:  2021    TIme In:     1330                 Time Out:   [address]    Patient Name:  Radha Liu    :  1945  MRN: [id number]    Restrictions/Precautions:  General precautions; NO ELECTRICAL STIM OR ULTRASOUND  Pertinent Medical History:  Medical/Treatment Diagnosis Information:    Cervical pain, headaches, strain/sprain; poor posture; s/p oral-maxillary surgery for cancer        Insurance/Certification information: Medicare, [facility], [facility]    Physician Information:   Bryanna Rea DMD, MD  Plan of care signed (Y/N):  Yes  Visit# / total visits:   23 /    G-Code (if applicable):      Date / Visit # G-Code Applied:         Progress Note: []  Yes  [x]  No  Next due by: Visit #10      Pain level:   5 /10    Subjective:   Patient reports: moderate pain today, in both neck and hands     Objective:   Observation:    Test measurements:  Neck Index = 62   Palpation: (+) tenderness and increased muscle tension over scalenes, paraspinals, UT's.  MT's    Exercises:  Exercise Resistance/Repetitions Other comments   Scap squeezes 3 x 10 6   Gentle chin tucks with posture correction 3 x 10 6   Cervical Isometrics SB, BB 3 x 10 6   SCM stretch - B 15\" x 5 each 6                                             Other Therapeutic Activities:      Manual Treatments:  STM to bilateral traps and T-spine paraspinals, patient seated - 24'    Modalities:  Moist heat to UT's x 15' - to finish      Timed Code Treatment Minutes:   40'      Total Treatment Minutes:    62 '    Treatment/Activity Tolerance:  [x] Patient tolerated treatment well [] Patient limited by fatigue  [] Patient limited by pain  [] Patient limited by other medical complications  [x] Other:     Pain after treatment:     3 /10    Prognosis: [x] Good [] Fair  [] Poor    Goals  Short term goals  Time Frame for Short term goals: 2 weeks  Short term goal 1: Patient to report a 10% decrease in neck pain average intensity. - met  Short term goal 2: Patient to be independent with HEP. - met  Long term goals  Time Frame for Long term goals : 6-8 weeks  Long term goal 1: Patient to report a 75% decrease in neck pain on a daily basis, with performance of ADL's and I-ADL's. Long term goal 2: Patient to achieve a sleeping pattern of no more than 1 hour of disturbance due to neck pain. Long term goal 3: Patient achieve ability to perform basic houswork and personal care tasks with 75% less pain, and reported minimal or less difficulty. Long term goal 4: Patient to report a 75% decrease in headaches, allowing improved concentration, sleep, and higher quality of living. Patient is responding well to current treatment; however, as noted above, she has experienced an exacerbation of her symptoms from the unexpected spasm; her pain levels overall have been less since beginning PT; she will benefit from continued treatment.       Patient Requires Follow-up: [x] Yes  [] No    Plan:   [x] Continue per plan of care [] Alter current plan (see comments)  [] Plan of care initiated [] Hold pending MD visit [] Discharge    Plan for Next Session:        Electronically signed by:  Yolis Winston PT

## 2021-07-08 ENCOUNTER — APPOINTMENT (OUTPATIENT)
Dept: PHYSICAL THERAPY | Facility: HOSPITAL | Age: 76
End: 2021-07-08
Payer: MEDICARE

## 2021-07-13 ENCOUNTER — HOSPITAL ENCOUNTER (OUTPATIENT)
Dept: PHYSICAL THERAPY | Facility: HOSPITAL | Age: 76
Setting detail: THERAPIES SERIES
Discharge: HOME | End: 2021-07-13
Payer: MEDICARE

## 2021-07-13 PROCEDURE — 97140 MANUAL THERAPY 1/> REGIONS: CPT

## 2021-07-13 PROCEDURE — 97110 THERAPEUTIC EXERCISES: CPT

## 2021-07-13 NOTE — FLOWSHEET NOTE
Physical Therapy Treatment Note   Date:  2021    TIme In: 1329                     Time Out:   1139    Patient Name:  Pranav Marcus    :  1945  MRN: [id number]    Restrictions/Precautions:  General precautions; NO ELECTRICAL STIM OR ULTRASOUND  Pertinent Medical History:  Medical/Treatment Diagnosis Information:    Cervical pain, headaches, strain/sprain; poor posture; s/p oral-maxillary surgery for cancer        Insurance/Certification information: Medicare, [facility], [address]    Physician Information:   Carol Worley DMD, MD  Plan of care signed (Y/N):  Yes  Visit# / total visits:   21 /    G-Code (if applicable):      Date / Visit # G-Code Applied:         Progress Note: []  Yes  [x]  No  Next due by: Visit #10      Pain level:   3/10    Subjective:   Patient reports she is feeling pretty good today. Objective:   Observation:    Test measurements:  Neck Index = 62   Palpation: (+) tenderness and increased muscle tension over scalenes, paraspinals, UT's. MT's    Exercises:  Exercise Resistance/Repetitions Other comments   Scap squeezes 3 x 10 13   Gentle chin tucks with posture correction 3 x 10 13   Cervical Isometrics SB, BB 3 x 10 13   SCM stretch - B 15\" x 5 each 13                                             Other Therapeutic Activities:      Manual Treatments:  STM to bilateral traps and T-spine paraspinals, patient seated - 21' by Nhan Frederick PT. Modalities:  Moist heat to UT's x 15' - to finish      Timed Code Treatment Minutes:   45      Total Treatment Minutes:    64    Treatment/Activity Tolerance:  [x] Patient tolerated treatment well [] Patient limited by fatigue  [] Patient limited by pain  [] Patient limited by other medical complications  [x] Other: Pt completed tx with no pain and good tolerance to manual tx.     Pain after treatment:      0/10    Prognosis: [x] Good [] Fair  [] Poor    Goals  Short term goals  Time Frame for Short term goals: 2 weeks  Short term goal 1: Patient to report a 10% decrease in neck pain average intensity. - met  Short term goal 2: Patient to be independent with HEP. - met  Long term goals  Time Frame for Long term goals : 6-8 weeks  Long term goal 1: Patient to report a 75% decrease in neck pain on a daily basis, with performance of ADL's and I-ADL's. Long term goal 2: Patient to achieve a sleeping pattern of no more than 1 hour of disturbance due to neck pain. Long term goal 3: Patient achieve ability to perform basic houswork and personal care tasks with 75% less pain, and reported minimal or less difficulty. Long term goal 4: Patient to report a 75% decrease in headaches, allowing improved concentration, sleep, and higher quality of living.       Patient Requires Follow-up: [x] Yes  [] No    Plan:   [x] Continue per plan of care [] Alter current plan (see comments)  [] Plan of care initiated [] Hold pending MD visit [] Discharge    Plan for Next Session:        Electronically signed by:  Ziyad Calderon PTA

## 2021-07-15 ENCOUNTER — HOSPITAL ENCOUNTER (OUTPATIENT)
Dept: PHYSICAL THERAPY | Facility: HOSPITAL | Age: 76
Setting detail: THERAPIES SERIES
Discharge: HOME | End: 2021-07-15
Payer: MEDICARE

## 2021-07-15 PROCEDURE — 97150 GROUP THERAPEUTIC PROCEDURES: CPT

## 2021-07-15 PROCEDURE — 97140 MANUAL THERAPY 1/> REGIONS: CPT

## 2021-07-15 NOTE — FLOWSHEET NOTE
Physical Therapy Treatment Note   Date:  7/15/2021    TIme In:  1329                     Time Out:   1430    Patient Name:  David Mata    :  1945  MRN: [id number]    Restrictions/Precautions:  General precautions; NO ELECTRICAL STIM OR ULTRASOUND  Pertinent Medical History:  Medical/Treatment Diagnosis Information:    Cervical pain, headaches, strain/sprain; poor posture; s/p oral-maxillary surgery for cancer        Insurance/Certification information: Medicare, [facility], [address]    Physician Information:   Reagan Munoz DMD, MD  Plan of care signed (Y/N):  Yes  Visit# / total visits:   21 /    G-Code (if applicable):      Date / Visit # G-Code Applied:         Progress Note: []  Yes  [x]  No  Next due by: Visit #10      Pain level:   3/10    Subjective:   Patient reports she is feeling pretty good today. Objective:   Observation:    Test measurements:  Neck Index = 62   Palpation: (+) tenderness and increased muscle tension over scalenes, paraspinals, UT's. MT's    Exercises:  Exercise Resistance/Repetitions Other comments   Scap squeezes 3 x 10 15   Gentle chin tucks with posture correction 3 x 10 15   Cervical Isometrics SB, BB 3 x 10 15   SCM stretch - B 15\" x 5 each 15                                             Other Therapeutic Activities:      Manual Treatments:  STM to bilateral traps and T-spine paraspinals, patient seated - 21' by Lissa Chirinos PT.     Modalities:  Moist heat to UT's x 15' - to finish      Timed Code Treatment Minutes:   36'      Total Treatment Minutes:    61'    Treatment/Activity Tolerance:  [x] Patient tolerated treatment well [] Patient limited by fatigue  [] Patient limited by pain  [] Patient limited by other medical complications  [x] Other:    Pain after treatment:      0/10    Prognosis: [x] Good [] Fair  [] Poor    Goals  Short term goals  Time Frame for Short term goals: 2 weeks  Short term goal 1: Patient to report a 10% decrease in neck pain average intensity. - met  Short term goal 2: Patient to be independent with HEP. - met  Long term goals  Time Frame for Long term goals : 6-8 weeks  Long term goal 1: Patient to report a 75% decrease in neck pain on a daily basis, with performance of ADL's and I-ADL's. Long term goal 2: Patient to achieve a sleeping pattern of no more than 1 hour of disturbance due to neck pain. Long term goal 3: Patient achieve ability to perform basic houswork and personal care tasks with 75% less pain, and reported minimal or less difficulty. Long term goal 4: Patient to report a 75% decrease in headaches, allowing improved concentration, sleep, and higher quality of living.       Patient Requires Follow-up: [x] Yes  [] No    Plan:   [x] Continue per plan of care [] Alter current plan (see comments)  [] Plan of care initiated [] Hold pending MD visit [] Discharge    Plan for Next Session:        Electronically signed by:  Israel Matamoros PT

## 2021-07-20 ENCOUNTER — HOSPITAL ENCOUNTER (OUTPATIENT)
Dept: PHYSICAL THERAPY | Facility: HOSPITAL | Age: 76
Setting detail: THERAPIES SERIES
Discharge: HOME | End: 2021-07-20
Payer: MEDICARE

## 2021-07-20 PROCEDURE — 97140 MANUAL THERAPY 1/> REGIONS: CPT

## 2021-07-20 PROCEDURE — 97110 THERAPEUTIC EXERCISES: CPT

## 2021-07-20 NOTE — FLOWSHEET NOTE
Physical Therapy Treatment / Reassessment Note   Date:  2021    TIme In:  1330                     Time Out:   [address]    Patient Name:  Radha Liu    :  1945  MRN: [id number]    Restrictions/Precautions:  General precautions; NO ELECTRICAL STIM OR ULTRASOUND  Pertinent Medical History:  Medical/Treatment Diagnosis Information:    Cervical pain, headaches, strain/sprain; poor posture; s/p oral-maxillary surgery for cancer        Insurance/Certification information: Medicare, [facility], [facility]    Physician Information:   Bryanna Rea DMD, MD  Plan of care signed (Y/N):  Yes  Visit# / total visits:   25 /    G-Code (if applicable):      Date / Visit # G-Code Applied:         Progress Note: [x]  Yes  []  No  Next due by: Visit #10      Pain level:   4 /10    Subjective:   Patient reports moderate neck pain today; overall feels less pain on average day; able to complete daily tasks, ADL's, and household chores with less pain and difficulty;      Objective:   Observation:    Test measurements:  Neck Index = 44 (was 62 at last assessment)   Palpation: (+) tenderness and increased muscle tension over scalenes, paraspinals, UT's. MT's    Exercises:  Exercise Resistance/Repetitions Other comments   Scap squeezes 3 x 10 20   Gentle chin tucks with posture correction 3 x 10 20   Cervical Isometrics SB, BB 3 x 10 20   SCM stretch - B 15\" x 5 each 20                                             Other Therapeutic Activities:      Manual Treatments:  STM to bilateral traps and T-spine paraspinals, patient seated - 29' by Corinne Adams, PT.     Modalities:  Moist heat to UT's x 15' - to finish      Timed Code Treatment Minutes:   40'      Total Treatment Minutes:    76'    Treatment/Activity Tolerance:  [x] Patient tolerated treatment well [] Patient limited by fatigue  [] Patient limited by pain  [] Patient limited by other medical complications  [x] Other:    Pain after treatment:      2 /10    Prognosis: [x] Good [] Fair  [] Poor    Goals  Short term goals  Time Frame for Short term goals: 2 weeks  Short term goal 1: Patient to report a 10% decrease in neck pain average intensity. - met  Short term goal 2: Patient to be independent with HEP. - met  Long term goals  Time Frame for Long term goals : 6-8 weeks  Long term goal 1: Patient to report a 75% decrease in neck pain on a daily basis, with performance of ADL's and I-ADL's. - not met but improving gradually  Long term goal 2: Patient to achieve a sleeping pattern of no more than 1 hour of disturbance due to neck pain. Long term goal 3: Patient achieve ability to perform basic houswork and personal care tasks with 75% less pain, and reported minimal or less difficulty. Long term goal 4: Patient to report a 75% decrease in headaches, allowing improved concentration, sleep, and higher quality of living. Patient is responding slowly, but positively to current tx; she has met STG's; progress is anticipated to be gradual in nature due to patient's condition.       Patient Requires Follow-up: [x] Yes  [] No    Plan:   [x] Continue per plan of care [] Alter current plan (see comments)  [] Plan of care initiated [] Hold pending MD visit [] Discharge    Plan for Next Session:        Electronically signed by:  Samantha Hirsch PT

## 2021-07-22 ENCOUNTER — HOSPITAL ENCOUNTER (OUTPATIENT)
Dept: PHYSICAL THERAPY | Facility: HOSPITAL | Age: 76
Setting detail: THERAPIES SERIES
Discharge: HOME | End: 2021-07-22
Payer: MEDICARE

## 2021-07-22 PROCEDURE — 97140 MANUAL THERAPY 1/> REGIONS: CPT

## 2021-07-22 PROCEDURE — 97110 THERAPEUTIC EXERCISES: CPT

## 2021-07-22 NOTE — FLOWSHEET NOTE
Physical Therapy Treatment Note   Date:  2021    TIme In:  1326                     Time Out:   3134    Patient Name:  Audrie Bernheim    :  1945  MRN: [id number]    Restrictions/Precautions:  General precautions; NO ELECTRICAL STIM OR ULTRASOUND  Pertinent Medical History:  Medical/Treatment Diagnosis Information:    Cervical pain, headaches, strain/sprain; poor posture; s/p oral-maxillary surgery for cancer        Insurance/Certification information: Medicare, [facility], [address]    Physician Information:   Gucci Lopez DMD, MD  Plan of care signed (Y/N):  Yes  Visit# / total visits:   23 /    G-Code (if applicable):      Date / Visit # G-Code Applied:         Progress Note: []  Yes  [x]  No  Next due by: Visit #10      Pain level:   2 /10    Subjective:   Patient reports moderate neck pain today; overall feels less pain on average day; able to complete daily tasks, ADL's, and household chores with less pain and difficulty;      Objective:   Observation:    Test measurements:  Neck Index = 44 (was 62 at last assessment)   Palpation: (+) tenderness and increased muscle tension over scalenes, paraspinals, UT's. MT's    Exercises:  Exercise Resistance/Repetitions Other comments   Scap squeezes 3 x 10 22   Gentle chin tucks with posture correction 3 x 10 22   Cervical Isometrics SB, BB 3 x 10 22   SCM stretch - B 15\" x 5 each 22                                             Other Therapeutic Activities:      Manual Treatments:  STM to bilateral traps and T-spine paraspinals, patient seated - 29' by Yolis Winston PT.     Modalities:  Moist heat to UT's x 15' - to finish      Timed Code Treatment Minutes:  39'      Total Treatment Minutes:    64'    Treatment/Activity Tolerance:  [x] Patient tolerated treatment well [] Patient limited by fatigue  [] Patient limited by pain  [] Patient limited by other medical complications  [x] Other:    Pain after treatment:      0 10    Prognosis: [x] Good [] Fair  [] Poor    Goals  Short term goals  Time Frame for Short term goals: 2 weeks  Short term goal 1: Patient to report a 10% decrease in neck pain average intensity. - met  Short term goal 2: Patient to be independent with HEP. - met  Long term goals  Time Frame for Long term goals : 6-8 weeks  Long term goal 1: Patient to report a 75% decrease in neck pain on a daily basis, with performance of ADL's and I-ADL's. - not met but improving gradually  Long term goal 2: Patient to achieve a sleeping pattern of no more than 1 hour of disturbance due to neck pain. Long term goal 3: Patient achieve ability to perform basic houswork and personal care tasks with 75% less pain, and reported minimal or less difficulty. Long term goal 4: Patient to report a 75% decrease in headaches, allowing improved concentration, sleep, and higher quality of living. Patient is responding slowly, but positively to current tx; she has met STG's; progress is anticipated to be gradual in nature due to patient's condition.       Patient Requires Follow-up: [x] Yes  [] No    Plan:   [x] Continue per plan of care [] Alter current plan (see comments)  [] Plan of care initiated [] Hold pending MD visit [] Discharge    Plan for Next Session:        Electronically signed by:  Veda Cowden, PT

## 2021-07-27 ENCOUNTER — HOSPITAL ENCOUNTER (OUTPATIENT)
Dept: PHYSICAL THERAPY | Facility: HOSPITAL | Age: 76
Setting detail: THERAPIES SERIES
Discharge: HOME | End: 2021-07-27
Payer: MEDICARE

## 2021-07-27 PROCEDURE — 97110 THERAPEUTIC EXERCISES: CPT

## 2021-07-27 PROCEDURE — 97150 GROUP THERAPEUTIC PROCEDURES: CPT

## 2021-07-27 NOTE — FLOWSHEET NOTE
Physical Therapy Treatment Note   Date:  2021    TIme In:  3347                     Time Out:   1    Patient Name:  Juan Carlos Swift    :  1945  MRN: [id number]    Restrictions/Precautions:  General precautions; NO ELECTRICAL STIM OR ULTRASOUND  Pertinent Medical History:  Medical/Treatment Diagnosis Information:    Cervical pain, headaches, strain/sprain; poor posture; s/p oral-maxillary surgery for cancer        Insurance/Certification information: Medicare, [facility], [facility]    Physician Information:   Bboby Guzman DMD, MD  Plan of care signed (Y/N):  Yes  Visit# / total visits:   24 /    G-Code (if applicable):      Date / Visit # G-Code Applied:         Progress Note: []  Yes  [x]  No  Next due by: Visit #10      Pain level:   6 /10    Subjective:   Patient reports moderate neck pain today; states she went to the grocery store and while shopping, her pain worsened, to a point she has to leave the store without completing her shopping     Objective:   Observation:    Test measurements:  Neck Index = 44 (was 62 at last assessment)   Palpation: (+) tenderness and increased muscle tension over scalenes, paraspinals, UT's. MT's    Exercises:  Exercise Resistance/Repetitions Other comments   Scap squeezes 3 x 10 27   Gentle chin tucks with posture correction 3 x 10 27   Cervical Isometrics SB, BB 3 x 10 27   SCM stretch - B 15\" x 5 each 27                                             Other Therapeutic Activities:      Manual Treatments:  STM to bilateral traps and T-spine paraspinals, patient seated - 29' by Jaki Eason PT.     Modalities:  Moist heat to UT's x 15' - to finish      Timed Code Treatment Minutes:  36'      Total Treatment Minutes:    64'    Treatment/Activity Tolerance:  [x] Patient tolerated treatment well [] Patient limited by fatigue  [] Patient limited by pain  [] Patient limited by other medical complications  [x] Other:    Pain after treatment:      0 /10    Prognosis: [x] Good [] Fair  [] Poor    Goals  Short term goals  Time Frame for Short term goals: 2 weeks  Short term goal 1: Patient to report a 10% decrease in neck pain average intensity. - met  Short term goal 2: Patient to be independent with HEP. - met  Long term goals  Time Frame for Long term goals : 6-8 weeks  Long term goal 1: Patient to report a 75% decrease in neck pain on a daily basis, with performance of ADL's and I-ADL's. - not met but improving gradually  Long term goal 2: Patient to achieve a sleeping pattern of no more than 1 hour of disturbance due to neck pain. Long term goal 3: Patient achieve ability to perform basic houswork and personal care tasks with 75% less pain, and reported minimal or less difficulty. Long term goal 4: Patient to report a 75% decrease in headaches, allowing improved concentration, sleep, and higher quality of living. Patient is responding slowly, but positively to current tx; she has met STG's; progress is anticipated to be gradual in nature due to patient's condition.       Patient Requires Follow-up: [x] Yes  [] No    Plan:   [x] Continue per plan of care [] Alter current plan (see comments)  [] Plan of care initiated [] Hold pending MD visit [] Discharge    Plan for Next Session:        Electronically signed by:  Ning Ortiz PT

## 2021-07-29 ENCOUNTER — HOSPITAL ENCOUNTER (OUTPATIENT)
Dept: PHYSICAL THERAPY | Facility: HOSPITAL | Age: 76
Setting detail: THERAPIES SERIES
Discharge: HOME | End: 2021-07-29
Payer: MEDICARE

## 2021-07-29 PROCEDURE — 97150 GROUP THERAPEUTIC PROCEDURES: CPT

## 2021-07-29 PROCEDURE — 97140 MANUAL THERAPY 1/> REGIONS: CPT

## 2021-07-29 NOTE — FLOWSHEET NOTE
Physical Therapy Treatment Note   Date:  2021    TIme In:   0900                    Time Out:   1003    Patient Name:  Salo Fillers    :  1945  MRN: [id number]    Restrictions/Precautions:  General precautions; NO ELECTRICAL STIM OR ULTRASOUND  Pertinent Medical History:  Medical/Treatment Diagnosis Information:    Cervical pain, headaches, strain/sprain; poor posture; s/p oral-maxillary surgery for cancer        Insurance/Certification information: Medicare, [facility], [address]    Physician Information:   Gurinder Child DMD, MD  Plan of care signed (Y/N):  Yes  Visit# / total visits:   25 /    G-Code (if applicable):      Date / Visit # G-Code Applied:         Progress Note: []  Yes  [x]  No  Next due by: Visit #10      Pain level:   2/10    Subjective:   Patient reports her neck never really loosens up. Objective:   Observation:    Test measurements:  Neck Index = 44 (was 62 at last assessment)   Palpation: (+) tenderness and increased muscle tension over scalenes, paraspinals, UT's. MT's    Exercises:  Exercise Resistance/Repetitions Other comments   Scap squeezes 3 x 10 29   Gentle chin tucks with posture correction 3 x 10 29   Cervical Isometrics SB, BB 3 x 10 29   SCM stretch - B 15\" x 5 each 29                                             Other Therapeutic Activities:      Manual Treatments:  STM to bilateral traps and T-spine paraspinals, patient seated - 25'. Modalities:  Moist heat to UT's x 15' - to finish      Timed Code Treatment Minutes:  45    Total Treatment Minutes:   63     Treatment/Activity Tolerance:  [x] Patient tolerated treatment well [] Patient limited by fatigue  [] Patient limited by pain  [] Patient limited by other medical complications  [x] Other: Pt completed tx with no pain and mod to severe muscle tension noted bilaterally in cervical musculature but did respond well to manual tx.     Pain after treatment:     0/10    Prognosis: [x] Good [] Fair  [] Poor    Goals  Short term goals  Time Frame for Short term goals: 2 weeks  Short term goal 1: Patient to report a 10% decrease in neck pain average intensity. - met  Short term goal 2: Patient to be independent with HEP. - met  Long term goals  Time Frame for Long term goals : 6-8 weeks  Long term goal 1: Patient to report a 75% decrease in neck pain on a daily basis, with performance of ADL's and I-ADL's. - not met but improving gradually  Long term goal 2: Patient to achieve a sleeping pattern of no more than 1 hour of disturbance due to neck pain. Long term goal 3: Patient achieve ability to perform basic houswork and personal care tasks with 75% less pain, and reported minimal or less difficulty. Long term goal 4: Patient to report a 75% decrease in headaches, allowing improved concentration, sleep, and higher quality of living.       Patient Requires Follow-up: [x] Yes  [] No    Plan:   [x] Continue per plan of care [] Alter current plan (see comments)  [] Plan of care initiated [] Hold pending MD visit [] Discharge    Plan for Next Session:        Electronically signed by:  Denis Calderon PTA

## 2021-08-03 ENCOUNTER — HOSPITAL ENCOUNTER (OUTPATIENT)
Dept: PHYSICAL THERAPY | Facility: HOSPITAL | Age: 76
Setting detail: THERAPIES SERIES
Discharge: HOME | End: 2021-08-03
Payer: MEDICARE

## 2021-08-03 PROCEDURE — 97140 MANUAL THERAPY 1/> REGIONS: CPT

## 2021-08-03 PROCEDURE — 97110 THERAPEUTIC EXERCISES: CPT

## 2021-08-03 NOTE — FLOWSHEET NOTE
Poor    Goals  Short term goals  Time Frame for Short term goals: 2 weeks  Short term goal 1: Patient to report a 10% decrease in neck pain average intensity. - met  Short term goal 2: Patient to be independent with HEP. - met  Long term goals  Time Frame for Long term goals : 6-8 weeks  Long term goal 1: Patient to report a 75% decrease in neck pain on a daily basis, with performance of ADL's and I-ADL's. - not met but improving gradually  Long term goal 2: Patient to achieve a sleeping pattern of no more than 1 hour of disturbance due to neck pain. Long term goal 3: Patient achieve ability to perform basic houswork and personal care tasks with 75% less pain, and reported minimal or less difficulty. Long term goal 4: Patient to report a 75% decrease in headaches, allowing improved concentration, sleep, and higher quality of living.       Patient Requires Follow-up: [x] Yes  [] No    Plan:   [x] Continue per plan of care [] Alter current plan (see comments)  [] Plan of care initiated [] Hold pending MD visit [] Discharge    Plan for Next Session:        Electronically signed by:  Krystle Cartwright, PT

## 2021-08-05 ENCOUNTER — HOSPITAL ENCOUNTER (OUTPATIENT)
Dept: PHYSICAL THERAPY | Facility: HOSPITAL | Age: 76
Setting detail: THERAPIES SERIES
Discharge: HOME | End: 2021-08-05
Payer: MEDICARE

## 2021-08-05 PROCEDURE — 97110 THERAPEUTIC EXERCISES: CPT

## 2021-08-05 PROCEDURE — 97140 MANUAL THERAPY 1/> REGIONS: CPT

## 2021-08-05 NOTE — FLOWSHEET NOTE
Physical Therapy Treatment Note   Date:  2021    TIme In:    1339                  Time Out:   1439    Patient Name:  Osmel Mcallister    :  1945  MRN: [id number]    Restrictions/Precautions:  General precautions; NO ELECTRICAL STIM OR ULTRASOUND  Pertinent Medical History:  Medical/Treatment Diagnosis Information:    Cervical pain, headaches, strain/sprain; poor posture; s/p oral-maxillary surgery for cancer        Insurance/Certification information: Medicare, [facility], [address]    Physician Information:   Mesfin Aviles DMD, MD  Plan of care signed (Y/N):  Yes  Visit# / total visits:   32 /    G-Code (if applicable):      Date / Visit # G-Code Applied:         Progress Note: []  Yes  [x]  No  Next due by: Visit #10      Pain level:   2 /10    Subjective:   Patient reports: mild neck pain today; no new c/o. Objective:   Observation:    Test measurements:  Neck Index = 44 (was 62 at last assessment)   Palpation: (+) tenderness and increased muscle tension over scalenes, paraspinals, UT's. MT's    Exercises:  Exercise Resistance/Repetitions Other comments   Scap squeezes 3 x 10 5   Gentle chin tucks with posture correction 3 x 10 5   Cervical Isometrics SB, BB 3 x 10 5   SCM stretch - B 15\" x 5 each 5                                             Other Therapeutic Activities:      Manual Treatments:  STM to bilateral traps and T-spine paraspinals, patient seated - 25'. Modalities:  Moist heat to UT's x 15' - to finish      Timed Code Treatment Minutes:  36'    Total Treatment Minutes:   61'     Treatment/Activity Tolerance:  [x] Patient tolerated treatment well [] Patient limited by fatigue  [] Patient limited by pain  [] Patient limited by other medical complications  [x] Other: Pt completed tx with no pain and mod to severe muscle tension noted bilaterally in cervical musculature but did respond well to manual tx.     Pain after treatment:     010    Prognosis: [x] Good [] Fair  [] Poor    Goals  Short term goals  Time Frame for Short term goals: 2 weeks  Short term goal 1: Patient to report a 10% decrease in neck pain average intensity. - met  Short term goal 2: Patient to be independent with HEP. - met  Long term goals  Time Frame for Long term goals : 6-8 weeks  Long term goal 1: Patient to report a 75% decrease in neck pain on a daily basis, with performance of ADL's and I-ADL's. - not met but improving gradually  Long term goal 2: Patient to achieve a sleeping pattern of no more than 1 hour of disturbance due to neck pain. Long term goal 3: Patient achieve ability to perform basic houswork and personal care tasks with 75% less pain, and reported minimal or less difficulty. Long term goal 4: Patient to report a 75% decrease in headaches, allowing improved concentration, sleep, and higher quality of living.       Patient Requires Follow-up: [x] Yes  [] No    Plan:   [x] Continue per plan of care [] Alter current plan (see comments)  [] Plan of care initiated [] Hold pending MD visit [] Discharge    Plan for Next Session:        Electronically signed by:  Kendrick Parekh PT

## 2021-08-10 ENCOUNTER — HOSPITAL ENCOUNTER (OUTPATIENT)
Dept: PHYSICAL THERAPY | Facility: HOSPITAL | Age: 76
Setting detail: THERAPIES SERIES
Discharge: HOME | End: 2021-08-10
Payer: MEDICARE

## 2021-08-10 PROCEDURE — 97150 GROUP THERAPEUTIC PROCEDURES: CPT

## 2021-08-10 PROCEDURE — 97140 MANUAL THERAPY 1/> REGIONS: CPT

## 2021-08-10 NOTE — FLOWSHEET NOTE
Physical Therapy Treatment Note   Date:  8/10/2021    TIme In:    3077                  Time Out:   2862    Patient Name:  Audrie Bernheim    :  1945  MRN: [id number]    Restrictions/Precautions:  General precautions; NO ELECTRICAL STIM OR ULTRASOUND  Pertinent Medical History:  Medical/Treatment Diagnosis Information:    Cervical pain, headaches, strain/sprain; poor posture; s/p oral-maxillary surgery for cancer        Insurance/Certification information: Medicare, [facility], [address]    Physician Information:   Gucci Lopez DMD, MD  Plan of care signed (Y/N):  Yes  Visit# / total visits:   29 /    G-Code (if applicable):      Date / Visit # G-Code Applied:         Progress Note: []  Yes  [x]  No  Next due by: Visit #10      Pain level:   4 /10    Subjective:   Patient reports: moderate neck pain today; no new c/o. Objective:   Observation:    Test measurements:  Neck Index = 44 (was 62 at last assessment)   Palpation: (+) tenderness and increased muscle tension over scalenes, paraspinals, UT's. MT's    Exercises:  Exercise Resistance/Repetitions Other comments   Scap squeezes 3 x 10 10   Gentle chin tucks with posture correction 3 x 10 10   Cervical Isometrics SB, BB 3 x 10 10   SCM stretch - B 15\" x 5 each 10                                             Other Therapeutic Activities:      Manual Treatments:  STM to bilateral traps and T-spine paraspinals, patient seated - 25'. Modalities:  Moist heat to UT's x 15' - to finish      Timed Code Treatment Minutes:  36'    Total Treatment Minutes:   62'     Treatment/Activity Tolerance:  [x] Patient tolerated treatment well [] Patient limited by fatigue  [] Patient limited by pain  [] Patient limited by other medical complications  [x] Other: Pt completed tx with no pain and mod to severe muscle tension noted bilaterally in cervical musculature but did respond well to manual tx.     Pain after treatment:     010    Prognosis: [x] Good [] Fair  [] Poor    Goals  Short term goals  Time Frame for Short term goals: 2 weeks  Short term goal 1: Patient to report a 10% decrease in neck pain average intensity. - met  Short term goal 2: Patient to be independent with HEP. - met  Long term goals  Time Frame for Long term goals : 6-8 weeks  Long term goal 1: Patient to report a 75% decrease in neck pain on a daily basis, with performance of ADL's and I-ADL's. - not met but improving gradually  Long term goal 2: Patient to achieve a sleeping pattern of no more than 1 hour of disturbance due to neck pain. Long term goal 3: Patient achieve ability to perform basic houswork and personal care tasks with 75% less pain, and reported minimal or less difficulty. Long term goal 4: Patient to report a 75% decrease in headaches, allowing improved concentration, sleep, and higher quality of living.       Patient Requires Follow-up: [x] Yes  [] No    Plan:   [x] Continue per plan of care [] Alter current plan (see comments)  [] Plan of care initiated [] Hold pending MD visit [] Discharge    Plan for Next Session:        Electronically signed by:  Amelia Vallecillo, PT

## 2021-08-12 ENCOUNTER — HOSPITAL ENCOUNTER (OUTPATIENT)
Dept: PHYSICAL THERAPY | Facility: HOSPITAL | Age: 76
Setting detail: THERAPIES SERIES
Discharge: HOME | End: 2021-08-12
Payer: MEDICARE

## 2021-08-12 PROCEDURE — 97150 GROUP THERAPEUTIC PROCEDURES: CPT

## 2021-08-12 PROCEDURE — 97140 MANUAL THERAPY 1/> REGIONS: CPT

## 2021-08-12 NOTE — FLOWSHEET NOTE
Physical Therapy Treatment Note   Date:  2021    TIme In:    1320                  Time Out:   7528    Patient Name:  Jarod Scott    :  1945  MRN: [id number]    Restrictions/Precautions:  General precautions; NO ELECTRICAL STIM OR ULTRASOUND  Pertinent Medical History:  Medical/Treatment Diagnosis Information:    Cervical pain, headaches, strain/sprain; poor posture; s/p oral-maxillary surgery for cancer        Insurance/Certification information: Medicare, [facility], [facility]    Physician Information:   Michelle Stevens DMD, MD  Plan of care signed (Y/N):  Yes  Visit# / total visits:   34 /    G-Code (if applicable):      Date / Visit # G-Code Applied:         Progress Note: []  Yes  [x]  No  Next due by: Visit #10      Pain level:   2 /10    Subjective:   Patient reports: mild neck pain today; feels better since last visit; no new c/o. Objective:   Observation:    Test measurements:  Neck Index = 44 (was 62 at last assessment)   Palpation: (+) tenderness and increased muscle tension over scalenes, paraspinals, UT's. MT's    Exercises:  Exercise Resistance/Repetitions Other comments   Scap squeezes 3 x 10 12   Gentle chin tucks with posture correction 3 x 10 12   Cervical Isometrics SB, BB 3 x 10 12   SCM stretch - B 15\" x 5 each 12                                             Other Therapeutic Activities:      Manual Treatments:  STM to bilateral traps and T-spine paraspinals, patient seated - 25'. Modalities:  Moist heat to UT's x 15' - to finish      Timed Code Treatment Minutes:  36'    Total Treatment Minutes:   62'     Treatment/Activity Tolerance:  [x] Patient tolerated treatment well [] Patient limited by fatigue  [] Patient limited by pain  [] Patient limited by other medical complications  [x] Other: Pt completed tx with no pain and mod to severe muscle tension noted bilaterally in cervical musculature but did respond well to manual tx.     Pain after treatment:     010    Prognosis: [x] Good [] Fair  [] Poor    Goals  Short term goals  Time Frame for Short term goals: 2 weeks  Short term goal 1: Patient to report a 10% decrease in neck pain average intensity. - met  Short term goal 2: Patient to be independent with HEP. - met  Long term goals  Time Frame for Long term goals : 6-8 weeks  Long term goal 1: Patient to report a 75% decrease in neck pain on a daily basis, with performance of ADL's and I-ADL's. - not met but improving gradually  Long term goal 2: Patient to achieve a sleeping pattern of no more than 1 hour of disturbance due to neck pain. Long term goal 3: Patient achieve ability to perform basic houswork and personal care tasks with 75% less pain, and reported minimal or less difficulty. Long term goal 4: Patient to report a 75% decrease in headaches, allowing improved concentration, sleep, and higher quality of living.       Patient Requires Follow-up: [x] Yes  [] No    Plan:   [x] Continue per plan of care [] Alter current plan (see comments)  [] Plan of care initiated [] Hold pending MD visit [] Discharge    Plan for Next Session:        Electronically signed by:  aHley Platt PT

## 2021-08-17 ENCOUNTER — HOSPITAL ENCOUNTER (OUTPATIENT)
Dept: PHYSICAL THERAPY | Facility: HOSPITAL | Age: 76
Setting detail: THERAPIES SERIES
Discharge: HOME | End: 2021-08-17
Payer: MEDICARE

## 2021-08-17 PROCEDURE — 97140 MANUAL THERAPY 1/> REGIONS: CPT

## 2021-08-17 PROCEDURE — 97110 THERAPEUTIC EXERCISES: CPT

## 2021-08-17 NOTE — FLOWSHEET NOTE
Physical Therapy Treatment Note   Date:  2021    TIme In:    1325                  Time Out:   18    Patient Name:  Jarod Scott    :  1945  MRN: [id number]    Restrictions/Precautions:  General precautions; NO ELECTRICAL STIM OR ULTRASOUND  Pertinent Medical History:  Medical/Treatment Diagnosis Information:    Cervical pain, headaches, strain/sprain; poor posture; s/p oral-maxillary surgery for cancer        Insurance/Certification information: Medicare, [facility], [facility] [address]    Physician Information:   Michelle Stevens DMD, MD  Plan of care signed (Y/N):  Yes  Visit# / total visits:   27 /    G-Code (if applicable):      Date / Visit # G-Code Applied:         Progress Note: []  Yes  [x]  No  Next due by: Visit #10      Pain level:   2 /10    Subjective:   Patient reports: mild neck pain today; feels better since last visit; no new c/o. Objective:   Observation:    Test measurements:  Neck Index = 44 (was 62 at last assessment)   Palpation: (+) tenderness and increased muscle tension over scalenes, paraspinals, UT's. MT's    Exercises:  Exercise Resistance/Repetitions Other comments   Scap squeezes 3 x 10 17   Gentle chin tucks with posture correction 3 x 10 17   Cervical Isometrics SB, BB 3 x 10 17   SCM stretch - B 15\" x 5 each 17                                             Other Therapeutic Activities:      Manual Treatments:  STM to bilateral traps and T-spine paraspinals, patient seated - 25'. Modalities:  Moist heat to UT's x 15' - to finish      Timed Code Treatment Minutes:  36'    Total Treatment Minutes:   58'     Treatment/Activity Tolerance:  [x] Patient tolerated treatment well [] Patient limited by fatigue  [] Patient limited by pain  [] Patient limited by other medical complications  [x] Other: Pt completed tx with no pain and mod to severe muscle tension noted bilaterally in cervical musculature but did respond well to manual tx.     Pain after treatment:     0/10    Prognosis: [x] Good [] Fair  [] Poor    Goals  Short term goals  Time Frame for Short term goals: 2 weeks  Short term goal 1: Patient to report a 10% decrease in neck pain average intensity. - met  Short term goal 2: Patient to be independent with HEP. - met  Long term goals  Time Frame for Long term goals : 6-8 weeks  Long term goal 1: Patient to report a 75% decrease in neck pain on a daily basis, with performance of ADL's and I-ADL's. - not met but improving gradually  Long term goal 2: Patient to achieve a sleeping pattern of no more than 1 hour of disturbance due to neck pain. Long term goal 3: Patient achieve ability to perform basic houswork and personal care tasks with 75% less pain, and reported minimal or less difficulty. Long term goal 4: Patient to report a 75% decrease in headaches, allowing improved concentration, sleep, and higher quality of living.       Patient Requires Follow-up: [x] Yes  [] No    Plan:   [x] Continue per plan of care [] Alter current plan (see comments)  [] Plan of care initiated [] Hold pending MD visit [] Discharge    Plan for Next Session:        Electronically signed by:  Brandon Carranza PT

## 2021-08-19 ENCOUNTER — HOSPITAL ENCOUNTER (OUTPATIENT)
Dept: PHYSICAL THERAPY | Facility: HOSPITAL | Age: 76
Setting detail: THERAPIES SERIES
Discharge: HOME | End: 2021-08-19
Payer: MEDICARE

## 2021-08-19 PROCEDURE — 97150 GROUP THERAPEUTIC PROCEDURES: CPT

## 2021-08-19 PROCEDURE — 97140 MANUAL THERAPY 1/> REGIONS: CPT

## 2021-08-19 NOTE — FLOWSHEET NOTE
Physical Therapy Treatment / Reassessment Note   Date:  2021    TIme In:    1331                  Time Out:   8122    Patient Name:  Mariano Maria    :  1945  MRN: [id number]    Restrictions/Precautions:  General precautions; NO ELECTRICAL STIM OR ULTRASOUND  Pertinent Medical History:  Medical/Treatment Diagnosis Information:    Cervical pain, headaches, strain/sprain; poor posture; s/p oral-maxillary surgery for cancer        Insurance/Certification information: Medicare, [facility], [address]    Physician Information:   Chelsey Blancas DMD, MD  Plan of care signed (Y/N):  Yes  Visit# / total visits:   32 /    G-Code (if applicable):      Date / Visit # G-Code Applied:         Progress Note: []  Yes  [x]  No  Next due by: Visit #10      Pain level:   2 /10    Subjective:   Patient reports: mild neck pain today; feels better since last visit; no new c/o. Objective:   Observation:    Test measurements:  Neck Index = 42 (was 44 at last assessment)   Palpation: (+) tenderness and increased muscle tension over scalenes, paraspinals, UT's. MT's    Exercises:  Exercise Resistance/Repetitions Other comments   Scap squeezes 3 x 10 19   Gentle chin tucks with posture correction 3 x 10 19   Cervical Isometrics SB, BB 3 x 10 19   SCM stretch - B 15\" x 5 each 19                                             Other Therapeutic Activities:      Manual Treatments:  STM to bilateral traps and T-spine paraspinals, patient seated - 25'. Modalities:  Moist heat to UT's x 15' - to finish      Timed Code Treatment Minutes:  36'    Total Treatment Minutes:   62'     Treatment/Activity Tolerance:  [x] Patient tolerated treatment well [] Patient limited by fatigue  [] Patient limited by pain  [] Patient limited by other medical complications  [x] Other: Pt completed tx with no pain and mod to severe muscle tension noted bilaterally in cervical musculature but did respond well to manual tx.     Pain after treatment: 0/10    Prognosis: [x] Good [] Fair  [] Poor    Goals  Short term goals  Time Frame for Short term goals: 2 weeks  Short term goal 1: Patient to report a 10% decrease in neck pain average intensity. - met  Short term goal 2: Patient to be independent with HEP. - met  Long term goals  Time Frame for Long term goals : 6-8 weeks  Long term goal 1: Patient to report a 75% decrease in neck pain on a daily basis, with performance of ADL's and I-ADL's. - not met but improving gradually  Long term goal 2: Patient to achieve a sleeping pattern of no more than 1 hour of disturbance due to neck pain. - not met  Long term goal 3: Patient achieve ability to perform basic houswork and personal care tasks with 75% less pain, and reported minimal or less difficulty. - not met  Long term goal 4: Patient to report a 75% decrease in headaches, allowing improved concentration, sleep, and higher quality of living. Patient is responding well to current treatment; however, as noted above, she has experienced an exacerbation of her symptoms from the unexpected spasm; her pain levels overall have been less since beginning PT; she will benefit from continued treatment.      Patient Requires Follow-up: [x] Yes  [] No    Plan:   [x] Continue per plan of care [] Alter current plan (see comments)  [] Plan of care initiated [] Hold pending MD visit [] Discharge    Plan for Next Session:        Electronically signed by:  Lissa Chirinos PT

## 2021-08-24 ENCOUNTER — HOSPITAL ENCOUNTER (OUTPATIENT)
Dept: PHYSICAL THERAPY | Facility: HOSPITAL | Age: 76
Setting detail: THERAPIES SERIES
Discharge: HOME | End: 2021-08-24
Payer: MEDICARE

## 2021-08-24 PROCEDURE — 97140 MANUAL THERAPY 1/> REGIONS: CPT

## 2021-08-24 PROCEDURE — 97150 GROUP THERAPEUTIC PROCEDURES: CPT

## 2021-08-24 NOTE — FLOWSHEET NOTE
Physical Therapy Treatment Note   Date:  2021    TIme In:    1326                 Time Out:   [place]    Patient Name:  David Mata    :  1945  MRN: [id number]    Restrictions/Precautions:  General precautions; NO ELECTRICAL STIM OR ULTRASOUND  Pertinent Medical History:  Medical/Treatment Diagnosis Information:    Cervical pain, headaches, strain/sprain; poor posture; s/p oral-maxillary surgery for cancer        Insurance/Certification information: Medicare, [facility], [address]    Physician Information:   Reagan Munoz DMD, MD  Plan of care signed (Y/N):  Yes  Visit# / total visits:   28 /    G-Code (if applicable):      Date / Visit # G-Code Applied:         Progress Note: []  Yes  [x]  No  Next due by: Visit #10      Pain level:   5 /10    Subjective:   Patient reports: moderate neck pain today, no known reason for increase in pain     Objective:   Observation:    Test measurements:  Neck Index = 42 (was 44 at last assessment)   Palpation: (+) tenderness and increased muscle tension over scalenes, paraspinals, UT's. MT's    Exercises:  Exercise Resistance/Repetitions Other comments   Scap squeezes 3 x 10 24   Gentle chin tucks with posture correction 3 x 10 24   Cervical Isometrics SB, BB 3 x 10 24   SCM stretch - B 15\" x 5 each 24                                             Other Therapeutic Activities:      Manual Treatments:  STM to bilateral traps and T-spine paraspinals, patient seated - 25'. Modalities:  Moist heat to UT's x 15' - to finish      Timed Code Treatment Minutes:  36'    Total Treatment Minutes:   54'     Treatment/Activity Tolerance:  [x] Patient tolerated treatment well [] Patient limited by fatigue  [] Patient limited by pain  [] Patient limited by other medical complications  [x] Other: Pt completed tx; noted mod to severe muscle tension noted bilaterally in cervical musculature but did respond well to manual tx.     Pain after treatment:     2 /10    Prognosis: [x] Good [] Fair  [] Poor    Goals  Short term goals  Time Frame for Short term goals: 2 weeks  Short term goal 1: Patient to report a 10% decrease in neck pain average intensity. - met  Short term goal 2: Patient to be independent with HEP. - met  Long term goals  Time Frame for Long term goals : 6-8 weeks  Long term goal 1: Patient to report a 75% decrease in neck pain on a daily basis, with performance of ADL's and I-ADL's. - not met but improving gradually  Long term goal 2: Patient to achieve a sleeping pattern of no more than 1 hour of disturbance due to neck pain. - not met  Long term goal 3: Patient achieve ability to perform basic houswork and personal care tasks with 75% less pain, and reported minimal or less difficulty. - not met  Long term goal 4: Patient to report a 75% decrease in headaches, allowing improved concentration, sleep, and higher quality of living. Patient is responding well to current treatment; however, as noted above, she has experienced an exacerbation of her symptoms from the unexpected spasm; her pain levels overall have been less since beginning PT; she will benefit from continued treatment.      Patient Requires Follow-up: [x] Yes  [] No    Plan:   [x] Continue per plan of care [] Alter current plan (see comments)  [] Plan of care initiated [] Hold pending MD visit [] Discharge    Plan for Next Session:        Electronically signed by:  Yolis Winston PT

## 2021-08-26 ENCOUNTER — HOSPITAL ENCOUNTER (OUTPATIENT)
Dept: PHYSICAL THERAPY | Facility: HOSPITAL | Age: 76
Setting detail: THERAPIES SERIES
Discharge: HOME | End: 2021-08-26
Payer: MEDICARE

## 2021-08-26 PROCEDURE — 97150 GROUP THERAPEUTIC PROCEDURES: CPT

## 2021-08-26 PROCEDURE — 97140 MANUAL THERAPY 1/> REGIONS: CPT

## 2021-08-26 NOTE — FLOWSHEET NOTE
Physical Therapy Treatment Note   Date:  2021    TIme In:    5386                  Time Out:   1427    Patient Name:  Salo Fillers    :  1945  MRN: [id number]    Restrictions/Precautions:  General precautions; NO ELECTRICAL STIM OR ULTRASOUND  Pertinent Medical History:  Medical/Treatment Diagnosis Information:    Cervical pain, headaches, strain/sprain; poor posture; s/p oral-maxillary surgery for cancer        Insurance/Certification information: Medicare, [facility], [address]    Physician Information:   Gurinder Child DMD, MD  Plan of care signed (Y/N):  Yes  Visit# / total visits:   28 /    G-Code (if applicable):      Date / Visit # G-Code Applied:         Progress Note: []  Yes  [x]  No  Next due by: Visit #10      Pain level:   3 /10    Subjective:   Patient reports: mild-mod neck pain today - feels better since last visit; no new c/o. Objective:   Observation:    Test measurements:  Neck Index = 42 (was 44 at last assessment)   Palpation: (+) tenderness and increased muscle tension over scalenes, paraspinals, UT's. MT's    Exercises:  Exercise Resistance/Repetitions Other comments   Scap squeezes 3 x 10 27   Gentle chin tucks with posture correction 3 x 10 27   Cervical Isometrics SB, BB 3 x 10 27   SCM stretch - B 15\" x 5 each 27                                             Other Therapeutic Activities:      Manual Treatments:  STM to bilateral traps and T-spine paraspinals, patient seated - 25'. Modalities:  Moist heat to UT's x 15' - to finish      Timed Code Treatment Minutes:  36'    Total Treatment Minutes:   61'     Treatment/Activity Tolerance:  [x] Patient tolerated treatment well [] Patient limited by fatigue  [] Patient limited by pain  [] Patient limited by other medical complications  [x] Other: Pt completed tx with no pain and mod to severe muscle tension noted bilaterally in cervical musculature but did respond well to manual tx.     Pain after treatment: 0/10    Prognosis: [x] Good [] Fair  [] Poor    Goals  Short term goals  Time Frame for Short term goals: 2 weeks  Short term goal 1: Patient to report a 10% decrease in neck pain average intensity. - met  Short term goal 2: Patient to be independent with HEP. - met  Long term goals  Time Frame for Long term goals : 6-8 weeks  Long term goal 1: Patient to report a 75% decrease in neck pain on a daily basis, with performance of ADL's and I-ADL's. - not met but improving gradually  Long term goal 2: Patient to achieve a sleeping pattern of no more than 1 hour of disturbance due to neck pain. - not met  Long term goal 3: Patient achieve ability to perform basic houswork and personal care tasks with 75% less pain, and reported minimal or less difficulty. - not met  Long term goal 4: Patient to report a 75% decrease in headaches, allowing improved concentration, sleep, and higher quality of living. Patient is responding well to current treatment; however, as noted above, she has experienced an exacerbation of her symptoms from the unexpected spasm; her pain levels overall have been less since beginning PT; she will benefit from continued treatment.      Patient Requires Follow-up: [x] Yes  [] No    Plan:   [x] Continue per plan of care [] Alter current plan (see comments)  [] Plan of care initiated [] Hold pending MD visit [] Discharge    Plan for Next Session:        Electronically signed by:  Joy Benjamin PT

## 2021-08-31 ENCOUNTER — HOSPITAL ENCOUNTER (OUTPATIENT)
Dept: PHYSICAL THERAPY | Facility: HOSPITAL | Age: 76
Setting detail: THERAPIES SERIES
Discharge: HOME | End: 2021-08-31
Payer: MEDICARE

## 2021-08-31 PROCEDURE — 97140 MANUAL THERAPY 1/> REGIONS: CPT

## 2021-08-31 PROCEDURE — 97150 GROUP THERAPEUTIC PROCEDURES: CPT

## 2021-08-31 NOTE — FLOWSHEET NOTE
Physical Therapy Treatment Note   Date:  2021    TIme In:    1322                  Time Out:   4178    Patient Name:  Radha Liu    :  1945  MRN: [id number]    Restrictions/Precautions:  General precautions; NO ELECTRICAL STIM OR ULTRASOUND  Pertinent Medical History:  Medical/Treatment Diagnosis Information:    Cervical pain, headaches, strain/sprain; poor posture; s/p oral-maxillary surgery for cancer        Insurance/Certification information: Medicare, [facility], [facility]    Physician Information:   Bryanna Rea DMD, MD  Plan of care signed (Y/N):  Yes  Visit# / total visits:   35 /    G-Code (if applicable):      Date / Visit # G-Code Applied:         Progress Note: []  Yes  [x]  No  Next due by: Visit #10      Pain level:   3 /10    Subjective:   Patient reports: mild-mod neck pain today - feels better since last visit; no new c/o. Objective:   Observation:    Test measurements:  Neck Index = 42 (was 44 at last assessment)   Palpation: (+) tenderness and increased muscle tension over scalenes, paraspinals, UT's. MT's    Exercises:  Exercise Resistance/Repetitions Other comments   Scap squeezes 3 x 10 31   Gentle chin tucks with posture correction 3 x 10 31   Cervical Isometrics SB, BB 3 x 10 31   SCM stretch - B 15\" x 5 each 31                                             Other Therapeutic Activities:      Manual Treatments:  STM to bilateral traps and T-spine paraspinals, patient seated - 25'. Modalities:  Moist heat to UT's x 15' - to finish      Timed Code Treatment Minutes:  36'    Total Treatment Minutes:    54'    Treatment/Activity Tolerance:  [x] Patient tolerated treatment well [] Patient limited by fatigue  [] Patient limited by pain  [] Patient limited by other medical complications  [x] Other: Pt completed tx with no pain and mod to severe muscle tension noted bilaterally in cervical musculature but did respond well to manual tx.     Pain after treatment:    0 /10    Prognosis: [x] Good [] Fair  [] Poor    Goals  Short term goals  Time Frame for Short term goals: 2 weeks  Short term goal 1: Patient to report a 10% decrease in neck pain average intensity. - met  Short term goal 2: Patient to be independent with HEP. - met  Long term goals  Time Frame for Long term goals : 6-8 weeks  Long term goal 1: Patient to report a 75% decrease in neck pain on a daily basis, with performance of ADL's and I-ADL's. - not met but improving gradually  Long term goal 2: Patient to achieve a sleeping pattern of no more than 1 hour of disturbance due to neck pain. - not met  Long term goal 3: Patient achieve ability to perform basic houswork and personal care tasks with 75% less pain, and reported minimal or less difficulty. - not met  Long term goal 4: Patient to report a 75% decrease in headaches, allowing improved concentration, sleep, and higher quality of living. Patient is responding well to current treatment; however, as noted above, she has experienced an exacerbation of her symptoms from the unexpected spasm; her pain levels overall have been less since beginning PT; she will benefit from continued treatment.      Patient Requires Follow-up: [x] Yes  [] No    Plan:   [x] Continue per plan of care [] Alter current plan (see comments)  [] Plan of care initiated [] Hold pending MD visit [] Discharge    Plan for Next Session:        Electronically signed by:  Corinne Adams, PT

## 2021-09-02 ENCOUNTER — APPOINTMENT (OUTPATIENT)
Dept: PHYSICAL THERAPY | Facility: HOSPITAL | Age: 76
End: 2021-09-02
Payer: MEDICARE

## 2021-09-07 ENCOUNTER — HOSPITAL ENCOUNTER (OUTPATIENT)
Dept: PHYSICAL THERAPY | Facility: HOSPITAL | Age: 76
Setting detail: THERAPIES SERIES
Discharge: HOME | End: 2021-09-07
Payer: MEDICARE

## 2021-09-07 PROCEDURE — 97150 GROUP THERAPEUTIC PROCEDURES: CPT

## 2021-09-07 PROCEDURE — 97140 MANUAL THERAPY 1/> REGIONS: CPT

## 2021-09-07 NOTE — FLOWSHEET NOTE
Physical Therapy Treatment Note   Date:  2021    TIme In:    6724                  Time Out:   [address]    Patient Name:  Phillip Paredes    :  1945  MRN: [id number]    Restrictions/Precautions:  General precautions; NO ELECTRICAL STIM OR ULTRASOUND  Pertinent Medical History:  Medical/Treatment Diagnosis Information:    Cervical pain, headaches, strain/sprain; poor posture; s/p oral-maxillary surgery for cancer        Insurance/Certification information: Medicare, [facility], [address]    Physician Information:   Handy Mendoza DMD, MD  Plan of care signed (Y/N):  Yes  Visit# / total visits:   29 /    G-Code (if applicable):      Date / Visit # G-Code Applied:         Progress Note: []  Yes  [x]  No  Next due by: Visit #10      Pain level:   5 /10    Subjective:   Patient reports: moderate neck pain today; had to miss PT last week due to illness unrelated to neck pain. Objective:   Observation:    Test measurements:  Neck Index = 42 (was 44 at last assessment)   Palpation: (+) tenderness and increased muscle tension over scalenes, paraspinals, UT's. MT's    Exercises:  Exercise Resistance/Repetitions Other comments   Scap squeezes 3 x 10 7   Gentle chin tucks with posture correction 3 x 10 7   Cervical Isometrics SB, BB 3 x 10 7   SCM stretch - B 15\" x 5 each 7                                             Other Therapeutic Activities:      Manual Treatments:  STM to bilateral traps and T-spine paraspinals, patient seated - 25'. Modalities:  Moist heat to UT's x 15' - to finish      Timed Code Treatment Minutes:  36'    Total Treatment Minutes:    54'    Treatment/Activity Tolerance:  [x] Patient tolerated treatment well [] Patient limited by fatigue  [] Patient limited by pain  [] Patient limited by other medical complications  [x] Other: Pt completed tx with no pain and mod to severe muscle tension noted bilaterally in cervical musculature but did respond well to manual tx.     Pain after treatment:    0 /10    Prognosis: [x] Good [] Fair  [] Poor    Goals  Short term goals  Time Frame for Short term goals: 2 weeks  Short term goal 1: Patient to report a 10% decrease in neck pain average intensity. - met  Short term goal 2: Patient to be independent with HEP. - met  Long term goals  Time Frame for Long term goals : 6-8 weeks  Long term goal 1: Patient to report a 75% decrease in neck pain on a daily basis, with performance of ADL's and I-ADL's. - not met but improving gradually  Long term goal 2: Patient to achieve a sleeping pattern of no more than 1 hour of disturbance due to neck pain. - not met  Long term goal 3: Patient achieve ability to perform basic houswork and personal care tasks with 75% less pain, and reported minimal or less difficulty. - not met  Long term goal 4: Patient to report a 75% decrease in headaches, allowing improved concentration, sleep, and higher quality of living. Patient is responding well to current treatment; however, as noted above, she has experienced an exacerbation of her symptoms from the unexpected spasm; her pain levels overall have been less since beginning PT; she will benefit from continued treatment.      Patient Requires Follow-up: [x] Yes  [] No    Plan:   [x] Continue per plan of care [] Alter current plan (see comments)  [] Plan of care initiated [] Hold pending MD visit [] Discharge    Plan for Next Session:        Electronically signed by:  Floresita Morton PT

## 2021-09-08 ENCOUNTER — HOSPITAL ENCOUNTER (OUTPATIENT)
Facility: HOSPITAL | Age: 76
Discharge: HOME | End: 2021-09-08
Payer: MEDICARE

## 2021-09-08 PROCEDURE — 87086 URINE CULTURE/COLONY COUNT: CPT

## 2021-09-08 PROCEDURE — 87186 SC STD MICRODIL/AGAR DIL: CPT

## 2021-09-08 PROCEDURE — 87077 CULTURE AEROBIC IDENTIFY: CPT

## 2021-09-09 ENCOUNTER — HOSPITAL ENCOUNTER (OUTPATIENT)
Dept: PHYSICAL THERAPY | Facility: HOSPITAL | Age: 76
Setting detail: THERAPIES SERIES
Discharge: HOME | End: 2021-09-09
Payer: MEDICARE

## 2021-09-09 PROCEDURE — 97110 THERAPEUTIC EXERCISES: CPT

## 2021-09-09 PROCEDURE — 97140 MANUAL THERAPY 1/> REGIONS: CPT

## 2021-09-09 NOTE — FLOWSHEET NOTE
Physical Therapy Treatment Note   Date:  2021    TIme In:    1326                  Time Out:   1435    Patient Name:  Delores Wang    :  1945  MRN: [id number]    Restrictions/Precautions:  General precautions; NO ELECTRICAL STIM OR ULTRASOUND  Pertinent Medical History:  Medical/Treatment Diagnosis Information:    Cervical pain, headaches, strain/sprain; poor posture; s/p oral-maxillary surgery for cancer        Insurance/Certification information: Medicare, [facility], [address]    Physician Information:   Marco Ordonez DMD, MD  Plan of care signed (Y/N):  Yes  Visit# / total visits:   28 /    G-Code (if applicable):      Date / Visit # G-Code Applied:         Progress Note: []  Yes  [x]  No  Next due by: Visit #10      Pain level:   3 /10    Subjective:   Patient reports: mold-moderate neck pain today; neck is tight  Objective:   Observation:    Test measurements:  Neck Index = 42 (was 44 at last assessment)   Palpation: (+) tenderness and increased muscle tension over scalenes, paraspinals, UT's. MT's    Exercises:  Exercise Resistance/Repetitions Other comments   Scap squeezes 3 x 10 9   Gentle chin tucks with posture correction 3 x 10 9   Cervical Isometrics SB, BB 3 x 10 9   SCM stretch - B 15\" x 5 each 9                                             Other Therapeutic Activities:      Manual Treatments:  STM to bilateral traps and T-spine paraspinals, patient seated - 25'. Modalities:  Moist heat to UT's x 15' - to finish      Timed Code Treatment Minutes:  36'    Total Treatment Minutes:    70'    Treatment/Activity Tolerance:  [x] Patient tolerated treatment well [] Patient limited by fatigue  [] Patient limited by pain  [] Patient limited by other medical complications  [x] Other: Pt completed tx with no pain and mod to severe muscle tension noted bilaterally in cervical musculature but did respond well to manual tx.     Pain after treatment:    0 /10    Prognosis: [x] Good [] Fair  [] Poor    Goals  Short term goals  Time Frame for Short term goals: 2 weeks  Short term goal 1: Patient to report a 10% decrease in neck pain average intensity. - met  Short term goal 2: Patient to be independent with HEP. - met  Long term goals  Time Frame for Long term goals : 6-8 weeks  Long term goal 1: Patient to report a 75% decrease in neck pain on a daily basis, with performance of ADL's and I-ADL's. - not met but improving gradually  Long term goal 2: Patient to achieve a sleeping pattern of no more than 1 hour of disturbance due to neck pain. - not met  Long term goal 3: Patient achieve ability to perform basic houswork and personal care tasks with 75% less pain, and reported minimal or less difficulty. - not met  Long term goal 4: Patient to report a 75% decrease in headaches, allowing improved concentration, sleep, and higher quality of living. Patient is responding well to current treatment; however, as noted above, she has experienced an exacerbation of her symptoms from the unexpected spasm; her pain levels overall have been less since beginning PT; she will benefit from continued treatment.      Patient Requires Follow-up: [x] Yes  [] No    Plan:   [x] Continue per plan of care [] Alter current plan (see comments)  [] Plan of care initiated [] Hold pending MD visit [] Discharge    Plan for Next Session:        Electronically signed by:  Modesto Teixeira PT

## 2021-09-11 LAB
ORGANISM: ABNORMAL
URINE CULTURE, ROUTINE: ABNORMAL

## 2021-09-14 ENCOUNTER — HOSPITAL ENCOUNTER (OUTPATIENT)
Dept: PHYSICAL THERAPY | Facility: HOSPITAL | Age: 76
Setting detail: THERAPIES SERIES
Discharge: HOME | End: 2021-09-14
Payer: MEDICARE

## 2021-09-14 PROCEDURE — 97150 GROUP THERAPEUTIC PROCEDURES: CPT

## 2021-09-14 PROCEDURE — 97140 MANUAL THERAPY 1/> REGIONS: CPT

## 2021-09-14 NOTE — FLOWSHEET NOTE
Physical Therapy Treatment Note   Date:  2021    TIme In:    2386                  Time Out:   1423    Patient Name:  Hair Lawson    :  1945  MRN: [id number]    Restrictions/Precautions:  General precautions; NO ELECTRICAL STIM OR ULTRASOUND  Pertinent Medical History:  Medical/Treatment Diagnosis Information:    Cervical pain, headaches, strain/sprain; poor posture; s/p oral-maxillary surgery for cancer        Insurance/Certification information: Medicare, [facility], [address]    Physician Information:   Twin Hughes DMD, MD  Plan of care signed (Y/N):  Yes  Visit# / total visits:   39 /    G-Code (if applicable):      Date / Visit # G-Code Applied:         Progress Note: []  Yes  [x]  No  Next due by: Visit #10      Pain level:   3 /10    Subjective:   Patient reports: mold-moderate neck pain today; neck is tight, stiff; had severe pain most of     Objective:   Observation:    Test measurements:  Neck Index = 42 (was 44 at last assessment)   Palpation: (+) tenderness and increased muscle tension over scalenes, paraspinals, UT's. MT's    Exercises:  Exercise Resistance/Repetitions Other comments   Scap squeezes 3 x 10 14   Gentle chin tucks with posture correction 3 x 10 14   Cervical Isometrics SB, BB 3 x 10 14   SCM stretch - B 15\" x 5 each 14                                             Other Therapeutic Activities:      Manual Treatments:  STM to bilateral traps and T-spine paraspinals, patient seated - 25'. Modalities:  Moist heat to UT's x 15' - to finish      Timed Code Treatment Minutes:  36'    Total Treatment Minutes:    64'    Treatment/Activity Tolerance:  [x] Patient tolerated treatment well [] Patient limited by fatigue  [] Patient limited by pain  [] Patient limited by other medical complications  [x] Other: Pt completed tx with no pain and mod to severe muscle tension noted bilaterally in cervical musculature but did respond well to manual tx.     Pain after treatment:    0 /10    Prognosis: [x] Good [] Fair  [] Poor    Goals  Short term goals  Time Frame for Short term goals: 2 weeks  Short term goal 1: Patient to report a 10% decrease in neck pain average intensity. - met  Short term goal 2: Patient to be independent with HEP. - met  Long term goals  Time Frame for Long term goals : 6-8 weeks  Long term goal 1: Patient to report a 75% decrease in neck pain on a daily basis, with performance of ADL's and I-ADL's. - not met but improving gradually  Long term goal 2: Patient to achieve a sleeping pattern of no more than 1 hour of disturbance due to neck pain. - not met  Long term goal 3: Patient achieve ability to perform basic houswork and personal care tasks with 75% less pain, and reported minimal or less difficulty. - not met  Long term goal 4: Patient to report a 75% decrease in headaches, allowing improved concentration, sleep, and higher quality of living. Patient is responding well to current treatment; however, as noted above, she has experienced an exacerbation of her symptoms from the unexpected spasm; her pain levels overall have been less since beginning PT; she will benefit from continued treatment.      Patient Requires Follow-up: [x] Yes  [] No    Plan:   [x] Continue per plan of care [] Alter current plan (see comments)  [] Plan of care initiated [] Hold pending MD visit [] Discharge    Plan for Next Session:        Electronically signed by:  Hilario Leyva PT

## 2021-09-16 ENCOUNTER — HOSPITAL ENCOUNTER (OUTPATIENT)
Dept: PHYSICAL THERAPY | Facility: HOSPITAL | Age: 76
Setting detail: THERAPIES SERIES
Discharge: HOME | End: 2021-09-16
Payer: MEDICARE

## 2021-09-16 PROCEDURE — 97140 MANUAL THERAPY 1/> REGIONS: CPT

## 2021-09-16 PROCEDURE — 97110 THERAPEUTIC EXERCISES: CPT

## 2021-09-16 NOTE — FLOWSHEET NOTE
Physical Therapy Treatment / Reassessment Note   Date:  2021    TIme In:    1331                  Time Out:   8864    Patient Name:  Mamta Mohan    :  1945  MRN: [id number]    Restrictions/Precautions:  General precautions; NO ELECTRICAL STIM OR ULTRASOUND  Pertinent Medical History:  Medical/Treatment Diagnosis Information:    Cervical pain, headaches, strain/sprain; poor posture; s/p oral-maxillary surgery for cancer        Insurance/Certification information: Medicare, [facility], [address]    Physician Information:   Suman Russo DMD, MD  Plan of care signed (Y/N):  Yes  Visit# / total visits:   40 /    G-Code (if applicable):      Date / Visit # G-Code Applied:         Progress Note: [x]  Yes  []  No  Next due by: Visit #10      Pain level:   4 /10    Subjective:   Patient reports: mold-moderate neck pain today; neck is tight, stiff; had severe pain most of     Objective:   Observation:    Test measurements:  Neck Index = 54 (was 42 at last assessment)   Palpation: (+) tenderness and increased muscle tension over scalenes, paraspinals, UT's. MT's    Exercises:  Exercise Resistance/Repetitions Other comments   Scap squeezes 3 x 10 16   Gentle chin tucks with posture correction 3 x 10 16   Cervical Isometrics SB, BB 3 x 10 16   SCM stretch - B 15\" x 5 each 16                                             Other Therapeutic Activities:      Manual Treatments:  STM to bilateral traps and T-spine paraspinals, patient seated - 25'. Modalities:  Moist heat to UT's x 15' - to finish      Timed Code Treatment Minutes:  36'    Total Treatment Minutes:    64'    Treatment/Activity Tolerance:  [x] Patient tolerated treatment well [] Patient limited by fatigue  [] Patient limited by pain  [] Patient limited by other medical complications  [x] Other: Pt completed tx with no pain and mod to severe muscle tension noted bilaterally in cervical musculature but did respond well to manual tx.     Pain after treatment:    0 /10    Prognosis: [x] Good [] Fair  [] Poor    Goals  Short term goals  Time Frame for Short term goals: 2 weeks  Short term goal 1: Patient to report a 10% decrease in neck pain average intensity. - met  Short term goal 2: Patient to be independent with HEP. - met  Long term goals  Time Frame for Long term goals : 6-8 weeks  Long term goal 1: Patient to report a 75% decrease in neck pain on a daily basis, with performance of ADL's and I-ADL's. - not met but improving gradually  Long term goal 2: Patient to achieve a sleeping pattern of no more than 1 hour of disturbance due to neck pain. - not met  Long term goal 3: Patient achieve ability to perform basic houswork and personal care tasks with 75% less pain, and reported minimal or less difficulty. - not met  Long term goal 4: Patient to report a 75% decrease in headaches, allowing improved concentration, sleep, and higher quality of living. Patient is responding well to current treatment; however, as noted above, she has experienced an exacerbation of her symptoms from the unexpected spasm; her pain levels overall have been less since beginning PT; she will benefit from continued treatment.      Patient Requires Follow-up: [x] Yes  [] No    Plan:   [x] Continue per plan of care [] Alter current plan (see comments)  [] Plan of care initiated [] Hold pending MD visit [] Discharge    Plan for Next Session:        Electronically signed by:  Jeoffrey Angelucci, PT

## 2021-09-21 ENCOUNTER — HOSPITAL ENCOUNTER (OUTPATIENT)
Dept: PHYSICAL THERAPY | Facility: HOSPITAL | Age: 76
Setting detail: THERAPIES SERIES
Discharge: HOME | End: 2021-09-21
Payer: MEDICARE

## 2021-09-21 PROCEDURE — 97150 GROUP THERAPEUTIC PROCEDURES: CPT

## 2021-09-21 PROCEDURE — 97140 MANUAL THERAPY 1/> REGIONS: CPT

## 2021-09-21 NOTE — FLOWSHEET NOTE
Physical Therapy Treatment Note   Date:  2021    TIme In:    1330                 Time Out:   1    Patient Name:  Audrie Bernheim    :  1945  MRN: [id number]    Restrictions/Precautions:  General precautions; NO ELECTRICAL STIM OR ULTRASOUND  Pertinent Medical History:  Medical/Treatment Diagnosis Information:    Cervical pain, headaches, strain/sprain; poor posture; s/p oral-maxillary surgery for cancer        Insurance/Certification information: Medicare, [facility], [address]    Physician Information:   Gucci Lopez DMD, MD  Plan of care signed (Y/N):  Yes  Visit# / total visits:   45 /    G-Code (if applicable):      Date / Visit # G-Code Applied:         Progress Note: []  Yes  [x]  No  Next due by: Visit #10      Pain level:   5 /10    Subjective:   Patient reports: moderate neck pain today; neck is tight, stiff; more pain over the weekend    Objective:   Observation:    Test measurements:  Neck Index = 54 (was 42 at last assessment)   Palpation: (+) tenderness and increased muscle tension over scalenes, paraspinals, UT's. MT's    Exercises:  Exercise Resistance/Repetitions Other comments   Scap squeezes 3 x 10 21   Gentle chin tucks with posture correction 3 x 10 21   Cervical Isometrics SB, BB 3 x 10 21   SCM stretch - B 15\" x 5 each 21   Scalene stretches 15\"  5                                         Other Therapeutic Activities:      Manual Treatments:  STM to bilateral traps and T-spine paraspinals, patient seated - 25'. Modalities:  Moist heat to UT's x 15' - to finish      Timed Code Treatment Minutes:    '    Total Treatment Minutes:     '    Treatment/Activity Tolerance:  [x] Patient tolerated treatment well [] Patient limited by fatigue  [] Patient limited by pain  [] Patient limited by other medical complications  [x] Other: Pt completed tx with no pain and mod to severe muscle tension noted bilaterally in cervical musculature but did respond well to manual tx.     Pain after treatment:    2 /10    Prognosis: [x] Good [] Fair  [] Poor    Goals  Short term goals  Time Frame for Short term goals: 2 weeks  Short term goal 1: Patient to report a 10% decrease in neck pain average intensity. - met  Short term goal 2: Patient to be independent with HEP. - met  Long term goals  Time Frame for Long term goals : 6-8 weeks  Long term goal 1: Patient to report a 75% decrease in neck pain on a daily basis, with performance of ADL's and I-ADL's. - not met but improving gradually  Long term goal 2: Patient to achieve a sleeping pattern of no more than 1 hour of disturbance due to neck pain. - not met  Long term goal 3: Patient achieve ability to perform basic houswork and personal care tasks with 75% less pain, and reported minimal or less difficulty. - not met  Long term goal 4: Patient to report a 75% decrease in headaches, allowing improved concentration, sleep, and higher quality of living. Patient is responding well to current treatment; however, as noted above, she has experienced an exacerbation of her symptoms from the unexpected spasm; her pain levels overall have been less since beginning PT; she will benefit from continued treatment.      Patient Requires Follow-up: [x] Yes  [] No    Plan:   [x] Continue per plan of care [] Alter current plan (see comments)  [] Plan of care initiated [] Hold pending MD visit [] Discharge    Plan for Next Session:        Electronically signed by:  Fidel Gray PT

## 2021-09-23 ENCOUNTER — HOSPITAL ENCOUNTER (OUTPATIENT)
Dept: PHYSICAL THERAPY | Facility: HOSPITAL | Age: 76
Setting detail: THERAPIES SERIES
Discharge: HOME | End: 2021-09-23
Payer: MEDICARE

## 2021-09-23 PROCEDURE — 97150 GROUP THERAPEUTIC PROCEDURES: CPT

## 2021-09-23 PROCEDURE — 97140 MANUAL THERAPY 1/> REGIONS: CPT

## 2021-09-23 NOTE — FLOWSHEET NOTE
Physical Therapy Treatment Note   Date:  2021    TIme In:    1331                 Time Out:   1    Patient Name:  Flor Mosley    :  1945  MRN: [id number]    Restrictions/Precautions:  General precautions; NO ELECTRICAL STIM OR ULTRASOUND  Pertinent Medical History:  Medical/Treatment Diagnosis Information:    Cervical pain, headaches, strain/sprain; poor posture; s/p oral-maxillary surgery for cancer        Insurance/Certification information: Medicare, [facility], [address]     Physician Information:   Sarah Beth Wolfe DMD, MD  Plan of care signed (Y/N):  Yes  Visit# / total visits:   44 /    G-Code (if applicable):      Date / Visit # G-Code Applied:         Progress Note: []  Yes  [x]  No  Next due by: Visit #10      Pain level:   3 /10    Subjective:   Patient reports: mild-moderate neck pain today; neck is tight, stiff; little less pain paris last visit    Objective:   Observation:    Test measurements:  Neck Index = 54 (was 42 at last assessment)   Palpation: (+) tenderness and increased muscle tension over scalenes, paraspinals, UT's. MT's    Exercises:  Exercise Resistance/Repetitions Other comments   Scap squeezes 3 x 10 23   Gentle chin tucks with posture correction 3 x 10 23   Cervical Isometrics SB, BB 3 x 10 23   SCM stretch - B 15\" x 5 each 23   Scalene stretches 15\"  5 23                                        Other Therapeutic Activities:      Manual Treatments:  STM to bilateral traps and T-spine paraspinals, patient seated - 25'.     Modalities:  Moist heat to UT's x 15' - to finish      Timed Code Treatment Minutes:  36  '    Total Treatment Minutes:   54  '    Treatment/Activity Tolerance:  [x] Patient tolerated treatment well [] Patient limited by fatigue  [] Patient limited by pain  [] Patient limited by other medical complications  [x] Other: Pt completed tx with no pain and mod to severe muscle tension noted bilaterally in cervical musculature but did respond well to manual tx.    Pain after treatment:    0 /10    Prognosis: [x] Good [] Fair  [] Poor    Goals  Short term goals  Time Frame for Short term goals: 2 weeks  Short term goal 1: Patient to report a 10% decrease in neck pain average intensity. - met  Short term goal 2: Patient to be independent with HEP. - met  Long term goals  Time Frame for Long term goals : 6-8 weeks  Long term goal 1: Patient to report a 75% decrease in neck pain on a daily basis, with performance of ADL's and I-ADL's. - not met but improving gradually  Long term goal 2: Patient to achieve a sleeping pattern of no more than 1 hour of disturbance due to neck pain. - not met  Long term goal 3: Patient achieve ability to perform basic houswork and personal care tasks with 75% less pain, and reported minimal or less difficulty. - not met  Long term goal 4: Patient to report a 75% decrease in headaches, allowing improved concentration, sleep, and higher quality of living. Patient is responding well to current treatment; however, as noted above, she has experienced an exacerbation of her symptoms from the unexpected spasm; her pain levels overall have been less since beginning PT; she will benefit from continued treatment.      Patient Requires Follow-up: [x] Yes  [] No    Plan:   [x] Continue per plan of care [] Alter current plan (see comments)  [] Plan of care initiated [] Hold pending MD visit [] Discharge    Plan for Next Session:        Electronically signed by:  Kendrick Parekh PT

## 2021-09-28 ENCOUNTER — HOSPITAL ENCOUNTER (OUTPATIENT)
Dept: PHYSICAL THERAPY | Facility: HOSPITAL | Age: 76
Setting detail: THERAPIES SERIES
Discharge: HOME | End: 2021-09-28
Payer: MEDICARE

## 2021-09-28 PROCEDURE — 97150 GROUP THERAPEUTIC PROCEDURES: CPT

## 2021-09-28 PROCEDURE — 97140 MANUAL THERAPY 1/> REGIONS: CPT

## 2021-09-28 NOTE — FLOWSHEET NOTE
Physical Therapy Treatment Note   Date:  2021    TIme In:    4194                 Time Out:   1412    Patient Name:  Dany Harden    :  1945  MRN: [id number]    Restrictions/Precautions:  General precautions; NO ELECTRICAL STIM OR ULTRASOUND  Pertinent Medical History:  Medical/Treatment Diagnosis Information:    Cervical pain, headaches, strain/sprain; poor posture; s/p oral-maxillary surgery for cancer        Insurance/Certification information: Medicare, [facility], [address]    Physician Information:   Brielle Maxwell DMD, MD  Plan of care signed (Y/N):  Yes  Visit# / total visits:   36 /    G-Code (if applicable):      Date / Visit # G-Code Applied:         Progress Note: []  Yes  [x]  No  Next due by: Visit #10      Pain level:   3 /10    Subjective:   Patient reports: mild-moderate neck pain today; neck is tight, stiff; little less pain paris last visit    Objective:   Observation:    Test measurements:  Neck Index = 54 (was 42 at last assessment)   Palpation: (+) tenderness and increased muscle tension over scalenes, paraspinals, UT's. MT's    Exercises:  Exercise Resistance/Repetitions Other comments   Scap squeezes 3 x 10 28   Gentle chin tucks with posture correction 3 x 10 28   Cervical Isometrics SB, BB 3 x 10 28   SCM stretch - B 15\" x 5 each 28   Scalene stretches 15\"  5 28                                        Other Therapeutic Activities:      Manual Treatments:  STM to bilateral traps and T-spine paraspinals, patient seated - 25'. Modalities:  Moist heat to UT's x 15' - to finish      Timed Code Treatment Minutes:  36  '    Total Treatment Minutes:   39 '    Treatment/Activity Tolerance:  [x] Patient tolerated treatment well [] Patient limited by fatigue  [] Patient limited by pain  [] Patient limited by other medical complications  [x] Other: Pt completed tx with no pain and mod to severe muscle tension noted bilaterally in cervical musculature but did respond well to manual tx.     Pain after treatment:    0 /10    Prognosis: [x] Good [] Fair  [] Poor    Goals  Short term goals  Time Frame for Short term goals: 2 weeks  Short term goal 1: Patient to report a 10% decrease in neck pain average intensity. - met  Short term goal 2: Patient to be independent with HEP. - met  Long term goals  Time Frame for Long term goals : 6-8 weeks  Long term goal 1: Patient to report a 75% decrease in neck pain on a daily basis, with performance of ADL's and I-ADL's. - not met but improving gradually  Long term goal 2: Patient to achieve a sleeping pattern of no more than 1 hour of disturbance due to neck pain. - not met  Long term goal 3: Patient achieve ability to perform basic houswork and personal care tasks with 75% less pain, and reported minimal or less difficulty. - not met  Long term goal 4: Patient to report a 75% decrease in headaches, allowing improved concentration, sleep, and higher quality of living. Patient is responding well to current treatment; however, as noted above, she has experienced an exacerbation of her symptoms from the unexpected spasm; her pain levels overall have been less since beginning PT; she will benefit from continued treatment.      Patient Requires Follow-up: [x] Yes  [] No    Plan:   [x] Continue per plan of care [] Alter current plan (see comments)  [] Plan of care initiated [] Hold pending MD visit [] Discharge    Plan for Next Session:        Electronically signed by:  Mary Almaraz PT

## 2021-09-30 ENCOUNTER — HOSPITAL ENCOUNTER (OUTPATIENT)
Dept: PHYSICAL THERAPY | Facility: HOSPITAL | Age: 76
Setting detail: THERAPIES SERIES
Discharge: HOME | End: 2021-09-30
Payer: MEDICARE

## 2021-09-30 PROCEDURE — 97150 GROUP THERAPEUTIC PROCEDURES: CPT

## 2021-09-30 PROCEDURE — 97140 MANUAL THERAPY 1/> REGIONS: CPT

## 2021-09-30 NOTE — FLOWSHEET NOTE
Physical Therapy Treatment Note   Date:  2021    TIme In:    5763                 Time Out:   1    Patient Name:  Angie Ferrell    :  1945  MRN: [id number]    Restrictions/Precautions:  General precautions; NO ELECTRICAL STIM OR ULTRASOUND  Pertinent Medical History:  Medical/Treatment Diagnosis Information:    Cervical pain, headaches, strain/sprain; poor posture; s/p oral-maxillary surgery for cancer        Insurance/Certification information: Medicare, [facility], [address]    Physician Information:   Douglas Bateman DMD, MD  Plan of care signed (Y/N):  Yes  Visit# / total visits:   39 /    G-Code (if applicable):      Date / Visit # G-Code Applied:         Progress Note: []  Yes  [x]  No  Next due by: Visit #10      Pain level:   3 /10    Subjective:   Patient reports: mild-moderate neck pain today; neck is tight, stiff; little less pain paris last visit    Objective:   Observation:    Test measurements:  Neck Index = 54 (was 42 at last assessment)   Palpation: (+) tenderness and increased muscle tension over scalenes, paraspinals, UT's. MT's    Exercises:  Exercise Resistance/Repetitions Other comments   Scap squeezes 3 x 10 30   Gentle chin tucks with posture correction 3 x 10 30   Cervical Isometrics SB, BB 3 x 10 30   SCM stretch - B 15\" x 5 each 30   Scalene stretches 15\"  5 30                                        Other Therapeutic Activities:      Manual Treatments:  STM to bilateral traps and T-spine paraspinals, patient seated - 25'. Modalities:  Moist heat to UT's x 15' - to finish      Timed Code Treatment Minutes:  36  '    Total Treatment Minutes:   64 '    Treatment/Activity Tolerance:  [x] Patient tolerated treatment well [] Patient limited by fatigue  [] Patient limited by pain  [] Patient limited by other medical complications  [x] Other: Pt completed tx with no pain and mod to severe muscle tension noted bilaterally in cervical musculature but did respond well to manual tx.     Pain after treatment:    0 /10    Prognosis: [x] Good [] Fair  [] Poor    Goals  Short term goals  Time Frame for Short term goals: 2 weeks  Short term goal 1: Patient to report a 10% decrease in neck pain average intensity. - met  Short term goal 2: Patient to be independent with HEP. - met  Long term goals  Time Frame for Long term goals : 6-8 weeks  Long term goal 1: Patient to report a 75% decrease in neck pain on a daily basis, with performance of ADL's and I-ADL's. - not met but improving gradually  Long term goal 2: Patient to achieve a sleeping pattern of no more than 1 hour of disturbance due to neck pain. - not met  Long term goal 3: Patient achieve ability to perform basic houswork and personal care tasks with 75% less pain, and reported minimal or less difficulty. - not met  Long term goal 4: Patient to report a 75% decrease in headaches, allowing improved concentration, sleep, and higher quality of living. Patient is responding well to current treatment; however, as noted above, she has experienced an exacerbation of her symptoms from the unexpected spasm; her pain levels overall have been less since beginning PT; she will benefit from continued treatment.      Patient Requires Follow-up: [x] Yes  [] No    Plan:   [x] Continue per plan of care [] Alter current plan (see comments)  [] Plan of care initiated [] Hold pending MD visit [] Discharge    Plan for Next Session:        Electronically signed by:  Jaky Muñiz PT

## 2021-10-05 ENCOUNTER — APPOINTMENT (OUTPATIENT)
Dept: PHYSICAL THERAPY | Facility: HOSPITAL | Age: 76
End: 2021-10-05
Payer: MEDICARE

## 2021-10-07 ENCOUNTER — APPOINTMENT (OUTPATIENT)
Dept: PHYSICAL THERAPY | Facility: HOSPITAL | Age: 76
End: 2021-10-07
Payer: MEDICARE

## 2021-10-12 ENCOUNTER — HOSPITAL ENCOUNTER (OUTPATIENT)
Dept: PHYSICAL THERAPY | Facility: HOSPITAL | Age: 76
Setting detail: THERAPIES SERIES
Discharge: HOME | End: 2021-10-12
Payer: MEDICARE

## 2021-10-12 PROCEDURE — 97140 MANUAL THERAPY 1/> REGIONS: CPT

## 2021-10-12 PROCEDURE — 97150 GROUP THERAPEUTIC PROCEDURES: CPT

## 2021-10-13 NOTE — FLOWSHEET NOTE
Physical Therapy Treatment Note   Date:  10/13/2021    TIme In:    1330                 Time Out:   1430    Patient Name:  Claire Davis    :  1945  MRN: [id number]    Restrictions/Precautions:  General precautions; NO ELECTRICAL STIM OR ULTRASOUND  Pertinent Medical History:  Medical/Treatment Diagnosis Information:    Cervical pain, headaches, strain/sprain; poor posture; s/p oral-maxillary surgery for cancer        Insurance/Certification information: Medicare, [facility], [address]    Physician Information:   Antonia Lazcano DMD, MD  Plan of care signed (Y/N):  Yes  Visit# / total visits:   43 /    G-Code (if applicable):      Date / Visit # G-Code Applied:         Progress Note: []  Yes  [x]  No  Next due by: Visit #10      Pain level:   4 /10    Subjective:   Patient reports: moderate neck pain today; neck is tight, stiff; has been out of town and busy, noting increased pain and needing to take routine rest breaks to ease pain    Objective:   Observation:    Test measurements:  Neck Index = 54 (was 42 at last assessment)   Palpation: (+) tenderness and increased muscle tension over scalenes, paraspinals, UT's. MT's    Exercises:  Exercise Resistance/Repetitions Other comments   Scap squeezes 3 x 10 12   Gentle chin tucks with posture correction 3 x 10 12   Cervical Isometrics SB, BB 3 x 10 12   SCM stretch - B 15\" x 5 each 12   Scalene stretches 15\"  5 12                                        Other Therapeutic Activities:      Manual Treatments:  STM to bilateral traps and T-spine paraspinals, patient seated - 25'.     Modalities:  Moist heat to UT's x 15' - to finish      Timed Code Treatment Minutes:  25 ' + group    Total Treatment Minutes:   61 '    Treatment/Activity Tolerance:  [x] Patient tolerated treatment well [] Patient limited by fatigue  [] Patient limited by pain  [] Patient limited by other medical complications  [x] Other: Pt completed tx with no pain and mod to severe muscle tension noted bilaterally in cervical musculature but did respond well to manual tx. Pain after treatment:    2 /10    Prognosis: [x] Good [] Fair  [] Poor    Goals  Short term goals  Time Frame for Short term goals: 2 weeks  Short term goal 1: Patient to report a 10% decrease in neck pain average intensity. - met  Short term goal 2: Patient to be independent with HEP. - met  Long term goals  Time Frame for Long term goals : 6-8 weeks  Long term goal 1: Patient to report a 75% decrease in neck pain on a daily basis, with performance of ADL's and I-ADL's. - not met but improving gradually  Long term goal 2: Patient to achieve a sleeping pattern of no more than 1 hour of disturbance due to neck pain. - not met  Long term goal 3: Patient achieve ability to perform basic houswork and personal care tasks with 75% less pain, and reported minimal or less difficulty. - not met  Long term goal 4: Patient to report a 75% decrease in headaches, allowing improved concentration, sleep, and higher quality of living. Patient is responding well to current treatment; however, as noted above, she has experienced an exacerbation of her symptoms from the unexpected spasm; her pain levels overall have been less since beginning PT; she will benefit from continued treatment.      Patient Requires Follow-up: [x] Yes  [] No    Plan:   [x] Continue per plan of care [] Alter current plan (see comments)  [] Plan of care initiated [] Hold pending MD visit [] Discharge    Plan for Next Session:        Electronically signed by:  Reagan Aschoff, PT

## 2021-10-14 ENCOUNTER — HOSPITAL ENCOUNTER (OUTPATIENT)
Dept: PHYSICAL THERAPY | Facility: HOSPITAL | Age: 76
Setting detail: THERAPIES SERIES
Discharge: HOME | End: 2021-10-14
Payer: MEDICARE

## 2021-10-14 PROCEDURE — 97150 GROUP THERAPEUTIC PROCEDURES: CPT

## 2021-10-14 PROCEDURE — 97140 MANUAL THERAPY 1/> REGIONS: CPT

## 2021-10-14 NOTE — FLOWSHEET NOTE
Physical Therapy Treatment / Reassessment Note   Date:  10/14/2021    TIme In:    2361                 Time Out:   9780    Patient Name:  Deshawn Hewitt    :  1945  MRN: [id number]    Restrictions/Precautions:  General precautions; NO ELECTRICAL STIM OR ULTRASOUND  Pertinent Medical History:  Medical/Treatment Diagnosis Information:    Cervical pain, headaches, strain/sprain; poor posture; s/p oral-maxillary surgery for cancer        Insurance/Certification information: Medicare, [facility], [facility] [address]    Physician Information:   Sumit Shankar DMD, MD  Plan of care signed (Y/N):  Yes  Visit# / total visits:   37 /    G-Code (if applicable):      Date / Visit # G-Code Applied:         Progress Note: []  Yes  [x]  No  Next due by: Visit #10      Pain level:   3 /10    Subjective:   Patient reports: moderate neck pain today; neck is tight, stiff; pain continues to fluctuate usually based on physical activity; therapy helps ease the pain    Objective:   Observation:    Test measurements:  Neck Index = 46 (was 54 at last assessment)   Palpation: (+) tenderness and increased muscle tension over scalenes, paraspinals, UT's. MT's    Exercises:  Exercise Resistance/Repetitions Other comments   Scap squeezes 3 x 10 14   Gentle chin tucks with posture correction 3 x 10 14   Cervical Isometrics SB, BB 3 x 10 14   SCM stretch - B 15\" x 5 each 14   Scalene stretches 15\"  5 14                                        Other Therapeutic Activities:      Manual Treatments:  STM to bilateral traps and T-spine paraspinals, patient seated - 25'.     Modalities:  Moist heat to UT's x 15' - to finish      Timed Code Treatment Minutes:  25 ' + group    Total Treatment Minutes:   61 '    Treatment/Activity Tolerance:  [x] Patient tolerated treatment well [] Patient limited by fatigue  [] Patient limited by pain  [] Patient limited by other medical complications  [x] Other: Pt completed tx with no pain and mod to severe muscle tension noted bilaterally in cervical musculature but did respond well to manual tx. Pain after treatment:    0 /10    Prognosis: [x] Good [] Fair  [] Poor    Goals  Short term goals  Time Frame for Short term goals: 2 weeks  Short term goal 1: Patient to report a 10% decrease in neck pain average intensity. - met  Short term goal 2: Patient to be independent with HEP. - met  Long term goals  Time Frame for Long term goals : 6-8 weeks  Long term goal 1: Patient to report a 75% decrease in neck pain on a daily basis, with performance of ADL's and I-ADL's. - not met but improving gradually  Long term goal 2: Patient to achieve a sleeping pattern of no more than 1 hour of disturbance due to neck pain. - not met  Long term goal 3: Patient achieve ability to perform basic houswork and personal care tasks with 75% less pain, and reported minimal or less difficulty. - not met  Long term goal 4: Patient to report a 75% decrease in headaches, allowing improved concentration, sleep, and higher quality of living. Patient is responding well to current treatment; however, as noted above, she has experienced an exacerbation of her symptoms from the unexpected spasm; her pain levels overall have been less since beginning PT; she will benefit from continued treatment.      Patient Requires Follow-up: [x] Yes  [] No    Plan:   [x] Continue per plan of care [] Alter current plan (see comments)  [] Plan of care initiated [] Hold pending MD visit [] Discharge    Plan for Next Session:        Electronically signed by:  Lamont Torre PT

## 2021-10-19 ENCOUNTER — HOSPITAL ENCOUNTER (OUTPATIENT)
Dept: PHYSICAL THERAPY | Facility: HOSPITAL | Age: 76
Setting detail: THERAPIES SERIES
Discharge: HOME | End: 2021-10-19
Payer: MEDICARE

## 2021-10-19 PROCEDURE — 97150 GROUP THERAPEUTIC PROCEDURES: CPT

## 2021-10-19 PROCEDURE — 97110 THERAPEUTIC EXERCISES: CPT

## 2021-10-19 NOTE — FLOWSHEET NOTE
Physical Therapy Treatment Note   Date:  10/19/2021    TIme In:    7394                 Time Out:   46    Patient Name:  Gucci Escalona    :  1945  MRN: [id number]    Restrictions/Precautions:  General precautions; NO ELECTRICAL STIM OR ULTRASOUND  Pertinent Medical History:  Medical/Treatment Diagnosis Information:    Cervical pain, headaches, strain/sprain; poor posture; s/p oral-maxillary surgery for cancer        Insurance/Certification information: Medicare, [facility], [address]    Physician Information:   Solis Chan DMD, MD  Plan of care signed (Y/N):  Yes  Visit# / total visits:   40 /    G-Code (if applicable):      Date / Visit # G-Code Applied:         Progress Note: []  Yes  [x]  No  Next due by: Visit #10      Pain level:   2 /10    Subjective:   Patient reports: moderate neck pain today; neck is tight, stiff; pain continues to fluctuate usually based on physical activity; therapy helps ease the pain    Objective:   Observation:    Test measurements:  Neck Index = 46 (was 54 at last assessment)   Palpation: (+) tenderness and increased muscle tension over scalenes, paraspinals, UT's. MT's    Exercises:  Exercise Resistance/Repetitions Other comments   Scap squeezes 3 x 10 19   Gentle chin tucks with posture correction 3 x 10 19   Cervical Isometrics SB, BB 3 x 10 19   SCM stretch - B 15\" x 5 each 19   Scalene stretches 15\"  5 19                                        Other Therapeutic Activities:      Manual Treatments:  STM to bilateral traps and T-spine paraspinals, patient seated - 25'.     Modalities:  Moist heat to UT's x 15' - to finish      Timed Code Treatment Minutes:  25 ' + group    Total Treatment Minutes:   59 '    Treatment/Activity Tolerance:  [x] Patient tolerated treatment well [] Patient limited by fatigue  [] Patient limited by pain  [] Patient limited by other medical complications  [x] Other: Pt completed tx with no pain and mod to severe muscle tension noted bilaterally in cervical musculature but did respond well to manual tx. Pain after treatment:    0 /10    Prognosis: [x] Good [] Fair  [] Poor    Goals  Short term goals  Time Frame for Short term goals: 2 weeks  Short term goal 1: Patient to report a 10% decrease in neck pain average intensity. - met  Short term goal 2: Patient to be independent with HEP. - met  Long term goals  Time Frame for Long term goals : 6-8 weeks  Long term goal 1: Patient to report a 75% decrease in neck pain on a daily basis, with performance of ADL's and I-ADL's. - not met but improving gradually  Long term goal 2: Patient to achieve a sleeping pattern of no more than 1 hour of disturbance due to neck pain. - not met  Long term goal 3: Patient achieve ability to perform basic houswork and personal care tasks with 75% less pain, and reported minimal or less difficulty. - not met  Long term goal 4: Patient to report a 75% decrease in headaches, allowing improved concentration, sleep, and higher quality of living. Patient is responding well to current treatment; however, as noted above, she has experienced an exacerbation of her symptoms from the unexpected spasm; her pain levels overall have been less since beginning PT; she will benefit from continued treatment.      Patient Requires Follow-up: [x] Yes  [] No    Plan:   [x] Continue per plan of care [] Alter current plan (see comments)  [] Plan of care initiated [] Hold pending MD visit [] Discharge    Plan for Next Session:        Electronically signed by:  Efra Kearney, PT

## 2021-10-21 ENCOUNTER — HOSPITAL ENCOUNTER (OUTPATIENT)
Dept: PHYSICAL THERAPY | Facility: HOSPITAL | Age: 76
Setting detail: THERAPIES SERIES
Discharge: HOME | End: 2021-10-21
Payer: MEDICARE

## 2021-10-21 PROCEDURE — 97150 GROUP THERAPEUTIC PROCEDURES: CPT

## 2021-10-21 PROCEDURE — 97140 MANUAL THERAPY 1/> REGIONS: CPT

## 2021-10-21 NOTE — FLOWSHEET NOTE
Physical Therapy Treatment Note   Date:  10/21/2021    TIme In:    1331                 Time Out:   1432    Patient Name:  Ashok Sandhoff    :  1945  MRN: [id number]    Restrictions/Precautions:  General precautions; NO ELECTRICAL STIM OR ULTRASOUND  Pertinent Medical History:  Medical/Treatment Diagnosis Information:    Cervical pain, headaches, strain/sprain; poor posture; s/p oral-maxillary surgery for cancer        Insurance/Certification information: Medicare, [facility], [address]    Physician Information:   Helen Hernandez DMD, MD  Plan of care signed (Y/N):  Yes  Visit# / total visits:   39 /    G-Code (if applicable):      Date / Visit # G-Code Applied:         Progress Note: []  Yes  [x]  No  Next due by: Visit #10      Pain level:   3 /10    Subjective:   Patient reports: moderate neck pain today; neck is tight, stiff; pain continues to fluctuate usually based on physical activity; therapy helps ease the pain    Objective:   Observation:    Test measurements:  Neck Index = 46 (was 54 at last assessment)   Palpation: (+) tenderness and increased muscle tension over scalenes, paraspinals, UT's. MT's    Exercises:  Exercise Resistance/Repetitions Other comments   Scap squeezes 3 x 10 21   Gentle chin tucks with posture correction 3 x 10 21   Cervical Isometrics SB, BB 3 x 10 21   SCM stretch - B 15\" x 5 each 21   Scalene stretches 15\"  5 21                                        Other Therapeutic Activities:      Manual Treatments:  STM to bilateral traps and T-spine paraspinals, patient seated - 25'.     Modalities:  Moist heat to UT's x 15' - to finish      Timed Code Treatment Minutes:  25 ' + group    Total Treatment Minutes:   64 '    Treatment/Activity Tolerance:  [x] Patient tolerated treatment well [] Patient limited by fatigue  [] Patient limited by pain  [] Patient limited by other medical complications  [x] Other: Pt completed tx with no pain and mod to severe muscle tension noted bilaterally in cervical musculature but did respond well to manual tx. Pain after treatment:    0 /10    Prognosis: [x] Good [] Fair  [] Poor    Goals  Short term goals  Time Frame for Short term goals: 2 weeks  Short term goal 1: Patient to report a 10% decrease in neck pain average intensity. - met  Short term goal 2: Patient to be independent with HEP. - met  Long term goals  Time Frame for Long term goals : 6-8 weeks  Long term goal 1: Patient to report a 75% decrease in neck pain on a daily basis, with performance of ADL's and I-ADL's. - not met but improving gradually  Long term goal 2: Patient to achieve a sleeping pattern of no more than 1 hour of disturbance due to neck pain. - not met  Long term goal 3: Patient achieve ability to perform basic houswork and personal care tasks with 75% less pain, and reported minimal or less difficulty. - not met  Long term goal 4: Patient to report a 75% decrease in headaches, allowing improved concentration, sleep, and higher quality of living. Patient is responding well to current treatment; however, as noted above, she has experienced an exacerbation of her symptoms from the unexpected spasm; her pain levels overall have been less since beginning PT; she will benefit from continued treatment.      Patient Requires Follow-up: [x] Yes  [] No    Plan:   [x] Continue per plan of care [] Alter current plan (see comments)  [] Plan of care initiated [] Hold pending MD visit [] Discharge    Plan for Next Session:        Electronically signed by:  Mert Joseph, PT

## 2021-10-26 ENCOUNTER — HOSPITAL ENCOUNTER (OUTPATIENT)
Dept: PHYSICAL THERAPY | Facility: HOSPITAL | Age: 76
Setting detail: THERAPIES SERIES
Discharge: HOME | End: 2021-10-26
Payer: MEDICARE

## 2021-10-26 PROCEDURE — 97150 GROUP THERAPEUTIC PROCEDURES: CPT

## 2021-10-26 PROCEDURE — 97140 MANUAL THERAPY 1/> REGIONS: CPT

## 2021-10-26 NOTE — FLOWSHEET NOTE
Physical Therapy Treatment Note   Date:  10/26/2021    TIme In:    1330                 Time Out:   [facility]    Patient Name:  Marlon Morley    :  1945  MRN: [id number]    Restrictions/Precautions:  General precautions; NO ELECTRICAL STIM OR ULTRASOUND  Pertinent Medical History:  Medical/Treatment Diagnosis Information:    Cervical pain, headaches, strain/sprain; poor posture; s/p oral-maxillary surgery for cancer        Insurance/Certification information: Medicare, [facility], [facility] [address]    Physician Information:   Triston Conde DMD, MD  Plan of care signed (Y/N):  Yes  Visit# / total visits:   55 /    G-Code (if applicable):      Date / Visit # G-Code Applied:         Progress Note: []  Yes  [x]  No  Next due by: Visit #10      Pain level:   3 /10    Subjective:   Patient reports: moderate neck pain today; neck is tight, stiff; pain continues to fluctuate usually based on physical activity; therapy helps ease the pain    Objective:   Observation:    Test measurements:  Neck Index = 46 (was 54 at last assessment)   Palpation: (+) tenderness and increased muscle tension over scalenes, paraspinals, UT's. MT's    Exercises:  Exercise Resistance/Repetitions Other comments   Scap squeezes 3 x 10 26   Gentle chin tucks with posture correction 3 x 10 26   Cervical Isometrics SB, BB 3 x 10 26   SCM stretch - B 15\" x 5 each 26   Scalene stretches 15\"  5 26                                        Other Therapeutic Activities:      Manual Treatments:  STM to bilateral traps and T-spine paraspinals, patient seated - 25'.     Modalities:  Moist heat to UT's x 15' - to finish      Timed Code Treatment Minutes:  25 ' + group    Total Treatment Minutes:   61'    Treatment/Activity Tolerance:  [x] Patient tolerated treatment well [] Patient limited by fatigue  [] Patient limited by pain  [] Patient limited by other medical complications  [x] Other: Pt completed tx with no pain and mod to severe muscle tension noted bilaterally in cervical musculature but did respond well to manual tx. Pain after treatment:    0 /10    Prognosis: [x] Good [] Fair  [] Poor    Goals  Short term goals  Time Frame for Short term goals: 2 weeks  Short term goal 1: Patient to report a 10% decrease in neck pain average intensity. - met  Short term goal 2: Patient to be independent with HEP. - met  Long term goals  Time Frame for Long term goals : 6-8 weeks  Long term goal 1: Patient to report a 75% decrease in neck pain on a daily basis, with performance of ADL's and I-ADL's. - not met but improving gradually  Long term goal 2: Patient to achieve a sleeping pattern of no more than 1 hour of disturbance due to neck pain. - not met  Long term goal 3: Patient achieve ability to perform basic houswork and personal care tasks with 75% less pain, and reported minimal or less difficulty. - not met  Long term goal 4: Patient to report a 75% decrease in headaches, allowing improved concentration, sleep, and higher quality of living. Patient is responding well to current treatment; however, as noted above, she has experienced an exacerbation of her symptoms from the unexpected spasm; her pain levels overall have been less since beginning PT; she will benefit from continued treatment.      Patient Requires Follow-up: [x] Yes  [] No    Plan:   [x] Continue per plan of care [] Alter current plan (see comments)  [] Plan of care initiated [] Hold pending MD visit [] Discharge    Plan for Next Session:        Electronically signed by:  Elijah Clemente, PT

## 2021-10-27 ENCOUNTER — HOSPITAL ENCOUNTER (OUTPATIENT)
Facility: HOSPITAL | Age: 76
Discharge: HOME | End: 2021-10-27
Payer: MEDICARE

## 2021-10-27 PROCEDURE — 87086 URINE CULTURE/COLONY COUNT: CPT

## 2021-10-28 ENCOUNTER — HOSPITAL ENCOUNTER (OUTPATIENT)
Dept: PHYSICAL THERAPY | Facility: HOSPITAL | Age: 76
Setting detail: THERAPIES SERIES
Discharge: HOME | End: 2021-10-28
Payer: MEDICARE

## 2021-10-28 PROCEDURE — 97150 GROUP THERAPEUTIC PROCEDURES: CPT

## 2021-10-28 PROCEDURE — 97140 MANUAL THERAPY 1/> REGIONS: CPT

## 2021-10-28 NOTE — FLOWSHEET NOTE
Physical Therapy Treatment Note / Discharge   Date:  10/28/2021    TIme In:    4131                 Time Out:   1430    Patient Name:  Yuniel Coleman    :  1945  MRN: [id number]    Restrictions/Precautions:  General precautions; NO ELECTRICAL STIM OR ULTRASOUND  Pertinent Medical History:  Medical/Treatment Diagnosis Information:    Cervical pain, headaches, strain/sprain; poor posture; s/p oral-maxillary surgery for cancer        Insurance/Certification information: Medicare, [facility], [address]    Physician Information:   Ina Ding DMD, MD  Plan of care signed (Y/N):  Yes  Visit# / total visits:   52 /    G-Code (if applicable):      Date / Visit # G-Code Applied:         Progress Note: [x]  Yes  []  No  Next due by: Visit #10      Pain level:   3 /10    Subjective:   Patient reports: moderate neck pain today; neck is tight, stiff; pain continues to fluctuate usually based on physical activity; therapy helps ease the pain    Objective:   Observation:    Test measurements:  Neck Index = 46 (was 54 at last assessment)   Palpation: (+) tenderness and increased muscle tension over scalenes, paraspinals, UT's. MT's    Exercises:  Exercise Resistance/Repetitions Other comments   Scap squeezes 3 x 10 28   Gentle chin tucks with posture correction 3 x 10 28   Cervical Isometrics SB, BB 3 x 10 28   SCM stretch - B 15\" x 5 each 28   Scalene stretches 15\"  5 28                                        Other Therapeutic Activities:      Manual Treatments:  STM to bilateral traps and T-spine paraspinals, patient seated - 25'.     Modalities:  Moist heat to UT's x 15' - to finish      Timed Code Treatment Minutes:  25 ' + group    Total Treatment Minutes:   58'    Treatment/Activity Tolerance:  [x] Patient tolerated treatment well [] Patient limited by fatigue  [] Patient limited by pain  [] Patient limited by other medical complications  [x] Other: Pt completed tx with no pain and mod to severe muscle tension noted bilaterally in cervical musculature but did respond well to manual tx. Pain after treatment:    0 /10    Prognosis: [x] Good [] Fair  [] Poor    Goals  Short term goals  Time Frame for Short term goals: 2 weeks  Short term goal 1: Patient to report a 10% decrease in neck pain average intensity. - met  Short term goal 2: Patient to be independent with HEP. - met  Long term goals  Time Frame for Long term goals : 6-8 weeks  Long term goal 1: Patient to report a 75% decrease in neck pain on a daily basis, with performance of ADL's and I-ADL's. - not met but improving gradually  Long term goal 2: Patient to achieve a sleeping pattern of no more than 1 hour of disturbance due to neck pain. - not met  Long term goal 3: Patient achieve ability to perform basic houswork and personal care tasks with 75% less pain, and reported minimal or less difficulty. - not met  Long term goal 4: Patient to report a 75% decrease in headaches, allowing improved concentration, sleep, and higher quality of living. Patient is responding well to current treatment; her pain levels overall have been less since beginning PT; her pain continues to fluctuate day to day; she responds well to treatment; she is independent with HEP; we will try to let her manage on her own with HEP at this time; due to the nature of her condition, she will likely need on-going treatment routinely to help reduce exacerbations.     Patient Requires Follow-up: [x] Yes  [] No    Plan:   [] Continue per plan of care [] Alter current plan (see comments)  [] Plan of care initiated [] Hold pending MD visit [x] Discharge    Plan for Next Session:        Electronically signed by:  Simon Alexandre PT

## 2021-10-29 LAB — URINE CULTURE, ROUTINE: NORMAL

## 2022-01-05 ENCOUNTER — HOSPITAL ENCOUNTER (OUTPATIENT)
Facility: HOSPITAL | Age: 77
Discharge: HOME | End: 2022-01-05
Payer: MEDICARE

## 2022-01-05 LAB
A/G RATIO: 1.9 (ref 0.8–2)
ALBUMIN SERPL-MCNC: 4.1 G/DL (ref 3.4–4.8)
ALP BLD-CCNC: 248 U/L (ref 25–100)
ALT SERPL-CCNC: 27 U/L (ref 4–36)
ANION GAP SERPL CALCULATED.3IONS-SCNC: 12 MMOL/L (ref 3–16)
AST SERPL-CCNC: 22 U/L (ref 8–33)
BILIRUB SERPL-MCNC: 0.3 MG/DL (ref 0.3–1.2)
BILIRUBIN URINE: NEGATIVE
BLOOD, URINE: NEGATIVE
BUN BLDV-MCNC: 15 MG/DL (ref 6–20)
CALCIUM SERPL-MCNC: 9.4 MG/DL (ref 8.5–10.5)
CHLORIDE BLD-SCNC: 103 MMOL/L (ref 98–107)
CLARITY: NORMAL
CO2: 22 MMOL/L (ref 20–30)
COLOR: YELLOW
CREAT SERPL-MCNC: 0.9 MG/DL (ref 0.4–1.2)
CREATININE URINE: 112.1 MG/DL (ref 28–259)
GFR AFRICAN AMERICAN: >59
GFR NON-AFRICAN AMERICAN: >60
GLOBULIN: 2.2 G/DL
GLUCOSE BLD-MCNC: 161 MG/DL (ref 74–106)
GLUCOSE URINE: NEGATIVE MG/DL
HBA1C MFR BLD: 6.8 %
HCT VFR BLD CALC: 37.4 % (ref 37–47)
HEMOGLOBIN: 11.8 G/DL (ref 11.5–16.5)
KETONES, URINE: NEGATIVE MG/DL
LEUKOCYTE ESTERASE, URINE: NEGATIVE
MCH RBC QN AUTO: 30.9 PG (ref 27–32)
MCHC RBC AUTO-ENTMCNC: 31.6 G/DL (ref 31–35)
MCV RBC AUTO: 97.9 FL (ref 80–100)
MICROSCOPIC EXAMINATION: NORMAL
NITRITE, URINE: NEGATIVE
PARATHYROID HORMONE INTACT: 46.4 PG/ML (ref 14–72)
PDW BLD-RTO: 12.8 % (ref 11–16)
PH UA: 5.5 (ref 5–8)
PLATELET # BLD: 133 K/UL (ref 150–400)
PMV BLD AUTO: 9.3 FL (ref 6–10)
POTASSIUM SERPL-SCNC: 4.7 MMOL/L (ref 3.4–5.1)
PROTEIN PROTEIN: 27 MG/DL
PROTEIN UA: NEGATIVE MG/DL
PROTEIN/CREAT RATIO: 0.2 MG/DL
RBC # BLD: 3.82 M/UL (ref 3.8–5.8)
SODIUM BLD-SCNC: 137 MMOL/L (ref 136–145)
SPECIFIC GRAVITY UA: 1.02 (ref 1–1.03)
TOTAL PROTEIN: 6.3 G/DL (ref 6.4–8.3)
URIC ACID, SERUM: 4 MG/DL (ref 2.5–7.1)
URINE TYPE: NORMAL
UROBILINOGEN, URINE: 0.2 E.U./DL
VITAMIN D 25-HYDROXY: 24.2 (ref 32–100)
WBC # BLD: 8 K/UL (ref 4–11)

## 2022-01-05 PROCEDURE — 83036 HEMOGLOBIN GLYCOSYLATED A1C: CPT

## 2022-01-05 PROCEDURE — 81003 URINALYSIS AUTO W/O SCOPE: CPT

## 2022-01-05 PROCEDURE — 82306 VITAMIN D 25 HYDROXY: CPT

## 2022-01-05 PROCEDURE — 84156 ASSAY OF PROTEIN URINE: CPT

## 2022-01-05 PROCEDURE — 85027 COMPLETE CBC AUTOMATED: CPT

## 2022-01-05 PROCEDURE — 83970 ASSAY OF PARATHORMONE: CPT

## 2022-01-05 PROCEDURE — 82570 ASSAY OF URINE CREATININE: CPT

## 2022-01-05 PROCEDURE — 84550 ASSAY OF BLOOD/URIC ACID: CPT

## 2022-01-05 PROCEDURE — 36415 COLL VENOUS BLD VENIPUNCTURE: CPT

## 2022-01-05 PROCEDURE — 80053 COMPREHEN METABOLIC PANEL: CPT

## 2022-01-25 ENCOUNTER — OFFICE VISIT (OUTPATIENT)
Dept: ENDOCRINOLOGY | Facility: CLINIC | Age: 77
End: 2022-01-25

## 2022-01-25 VITALS
WEIGHT: 140 LBS | DIASTOLIC BLOOD PRESSURE: 63 MMHG | SYSTOLIC BLOOD PRESSURE: 118 MMHG | OXYGEN SATURATION: 95 % | HEIGHT: 64 IN | HEART RATE: 84 BPM | BODY MASS INDEX: 23.9 KG/M2

## 2022-01-25 DIAGNOSIS — E11.65 TYPE 2 DIABETES MELLITUS WITH HYPERGLYCEMIA, WITHOUT LONG-TERM CURRENT USE OF INSULIN: Primary | Chronic | ICD-10-CM

## 2022-01-25 LAB
EXPIRATION DATE: ABNORMAL
GLUCOSE BLDC GLUCOMTR-MCNC: 209 MG/DL (ref 70–130)
Lab: ABNORMAL

## 2022-01-25 PROCEDURE — 82947 ASSAY GLUCOSE BLOOD QUANT: CPT | Performed by: PHYSICIAN ASSISTANT

## 2022-01-25 PROCEDURE — 99213 OFFICE O/P EST LOW 20 MIN: CPT | Performed by: PHYSICIAN ASSISTANT

## 2022-01-25 RX ORDER — NATEGLINIDE 120 MG/1
120 TABLET ORAL
Qty: 90 TABLET | Refills: 11 | Status: SHIPPED | OUTPATIENT
Start: 2022-01-25 | End: 2022-03-30

## 2022-01-25 RX ORDER — CARVEDILOL 25 MG/1
TABLET, FILM COATED ORAL
Qty: 100 EACH | Refills: 3 | Status: SHIPPED | OUTPATIENT
Start: 2022-01-25 | End: 2023-02-08

## 2022-02-15 ENCOUNTER — HOSPITAL ENCOUNTER (OUTPATIENT)
Facility: HOSPITAL | Age: 77
Discharge: HOME | End: 2022-02-15
Payer: MEDICARE

## 2022-02-15 PROCEDURE — 87086 URINE CULTURE/COLONY COUNT: CPT

## 2022-02-15 PROCEDURE — 87186 SC STD MICRODIL/AGAR DIL: CPT

## 2022-02-15 PROCEDURE — 87077 CULTURE AEROBIC IDENTIFY: CPT

## 2022-02-18 LAB
ORGANISM: ABNORMAL
URINE CULTURE, ROUTINE: ABNORMAL

## 2022-03-30 DIAGNOSIS — E11.65 TYPE 2 DIABETES MELLITUS WITH HYPERGLYCEMIA, WITHOUT LONG-TERM CURRENT USE OF INSULIN: Chronic | ICD-10-CM

## 2022-03-30 RX ORDER — NATEGLINIDE 120 MG/1
TABLET ORAL
Qty: 270 TABLET | Refills: 0 | Status: SHIPPED | OUTPATIENT
Start: 2022-03-30 | End: 2022-07-01

## 2022-04-20 ENCOUNTER — HOSPITAL ENCOUNTER (OUTPATIENT)
Facility: HOSPITAL | Age: 77
Discharge: HOME | End: 2022-04-20
Payer: MEDICARE

## 2022-04-20 PROCEDURE — 87086 URINE CULTURE/COLONY COUNT: CPT

## 2022-04-22 LAB — URINE CULTURE, ROUTINE: NORMAL

## 2022-05-13 ENCOUNTER — HOSPITAL ENCOUNTER (OUTPATIENT)
Facility: HOSPITAL | Age: 77
Discharge: HOME | End: 2022-05-13
Payer: MEDICARE

## 2022-05-13 PROCEDURE — 87086 URINE CULTURE/COLONY COUNT: CPT

## 2022-05-15 LAB — URINE CULTURE, ROUTINE: NORMAL

## 2022-07-01 DIAGNOSIS — E11.65 TYPE 2 DIABETES MELLITUS WITH HYPERGLYCEMIA, WITHOUT LONG-TERM CURRENT USE OF INSULIN: Chronic | ICD-10-CM

## 2022-07-01 RX ORDER — NATEGLINIDE 120 MG/1
TABLET ORAL
Qty: 270 TABLET | Refills: 0 | Status: SHIPPED | OUTPATIENT
Start: 2022-07-01 | End: 2022-08-15 | Stop reason: SDUPTHER

## 2022-08-15 ENCOUNTER — OFFICE VISIT (OUTPATIENT)
Dept: ENDOCRINOLOGY | Facility: CLINIC | Age: 77
End: 2022-08-15

## 2022-08-15 VITALS
HEART RATE: 80 BPM | WEIGHT: 134.8 LBS | SYSTOLIC BLOOD PRESSURE: 122 MMHG | HEIGHT: 64 IN | DIASTOLIC BLOOD PRESSURE: 62 MMHG | OXYGEN SATURATION: 96 % | BODY MASS INDEX: 23.01 KG/M2

## 2022-08-15 DIAGNOSIS — E11.65 TYPE 2 DIABETES MELLITUS WITH HYPERGLYCEMIA, WITHOUT LONG-TERM CURRENT USE OF INSULIN: Primary | Chronic | ICD-10-CM

## 2022-08-15 LAB
EXPIRATION DATE: ABNORMAL
EXPIRATION DATE: NORMAL
GLUCOSE BLDC GLUCOMTR-MCNC: 133 MG/DL (ref 70–130)
HBA1C MFR BLD: 7.3 %
Lab: ABNORMAL
Lab: NORMAL

## 2022-08-15 PROCEDURE — 99213 OFFICE O/P EST LOW 20 MIN: CPT | Performed by: PHYSICIAN ASSISTANT

## 2022-08-15 PROCEDURE — 83036 HEMOGLOBIN GLYCOSYLATED A1C: CPT | Performed by: PHYSICIAN ASSISTANT

## 2022-08-15 PROCEDURE — 3051F HG A1C>EQUAL 7.0%<8.0%: CPT | Performed by: PHYSICIAN ASSISTANT

## 2022-08-15 PROCEDURE — 82947 ASSAY GLUCOSE BLOOD QUANT: CPT | Performed by: PHYSICIAN ASSISTANT

## 2022-08-15 RX ORDER — NATEGLINIDE 120 MG/1
120 TABLET ORAL
Qty: 270 TABLET | Refills: 3 | Status: SHIPPED | OUTPATIENT
Start: 2022-08-15

## 2022-08-15 RX ORDER — OMEPRAZOLE 20 MG/1
TABLET, DELAYED RELEASE ORAL AS NEEDED
COMMUNITY
End: 2022-08-15

## 2022-08-15 NOTE — PROGRESS NOTES
Office Note      Date: 08/15/2022  Patient Name: Janette Diaz  MRN: [id number]  : 1945    Chief Complaint   Patient presents with   • Diabetes     History of Present Illness:   Janette Diaz is a 76 y.o. female who presents today for follow up on type 2 diabetes.  Diabetes was diagnosed in ~.  Current diabetic medications: nateglinide.  She is testing FSBG once per day at various times.  Fasting readings <130, then around 160 in the afternoon (2-3 hours after eating).  She denies any hypoglycemia.  Feet: No sores or other issues.  Foot exam up to date.   Last eye exam: 2022, Dr. Emigdio Velarde.  ACE inhibitor/ARB: no.  Statin: pravastatin.  Labs up to date with PCP.  She is followed by nephrology.    Subjective      Review of Systems   Constitutional: Negative.   Cardiovascular: Negative.    Endocrine: Negative.    Gastrointestinal: Negative.      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 6 oz.; 1970 -7 lbs. 3 oz. 2018   • Varicose veins    • Wears glasses       Past Surgical History:   Procedure Laterality Date   • SUPRACERVICAL HYSTERECTOMY SACROCOLPOPEXY N/A 2018    Procedure: LAPAROSCOPIC SUPRACERVICAL HYSTERECTOMY SACROCOLPOPEXY WITH DAVINCI ROBOT;  Surgeon: Mihai Best MD;  Location:  [facility];  Service: DaVinci   • ANTERIOR AND POSTERIOR VAGINAL REPAIR N/A 2018    Procedure: ANTERIOR AND POSTERIOR VAGINAL REPAIR;  Surgeon: Mihai Best MD;  Location:  [facility];  Service: Gynecology   • CYSTOSCOPY RETROGRADE PYELOGRAM Bilateral  "9/5/2018    Procedure: CYSTOSCOPY RETROGRADE PYELOGRAM;  Surgeon: Alex Harden MD;  Location:  [facility];  Service: Urology   • ANTERIOR VAGINAL REPAIR N/A 10/3/2018    Procedure: ANTERIOR VAGINAL REPAIR, TRANSVAGINAL TAPING SUSPENSION;  Surgeon: Mihai Best MD;  Location:  [facility];  Service: Obstetrics/Gynecology   • CHOLECYSTECTOMY     • COLONOSCOPY      4-5 YEARS AGO   • EYE SURGERY      cataract extraction with lens implant   • JOINT REPLACEMENT      bilateral knee replacement   • TUBAL ABDOMINAL LIGATION       The following portions of the patient's history were reviewed and updated as appropriate: allergies, current medications, past family history, past medical history, past social history, past surgical history and problem list.    Objective     Vitals:    08/15/22 0936   BP: 122/62   Pulse: 80   SpO2: 96%   Weight: 61.1 kg (134 lb 12.8 oz)   Height: 162.6 cm (64\")   PainSc: 0-No pain   Body mass index is 23.14 kg/m².    Physical Exam  Vitals reviewed.   Constitutional:       General: She is not in acute distress.  Neurological:      Mental Status: She is alert and oriented to person, place, and time.   Psychiatric:         Mood and Affect: Affect normal.         HEMOGLOBIN A1C  Lab Results   Component Value Date    HGBA1C 7.3 08/15/2022    HGBA1C 6.8 (H) 01/05/2022    HGBA1C 7.4 (H) 07/05/2021     GLUCOSE  Lab Results   Component Value Date    POCGLU 133 (A) 08/15/2022       Current Outpatient Medications   Medication Instructions   • allopurinol (ZYLOPRIM) 100 MG tablet 1 tablet, Oral, Daily   • amitriptyline (ELAVIL) 50 MG tablet Take 50 mg by mouth nightly.   • ASPIRIN 81 PO 81 mg, Oral, Daily   • B Complex Vitamins (VITAMIN B COMPLEX PO) Oral, Daily   • chlorhexidine (PERIDEX) 0.12 % solution Daily   • Contour Test test strip Testing 1 time per day; E11.9   • doxazosin (CARDURA) 8 MG tablet Daily   • gabapentin (NEURONTIN) 400 MG capsule 3 Times Daily   • HYDROcodone-acetaminophen (NORCO) 5-325 " MG per tablet hydrocodone 5 mg-acetaminophen 325 mg tablet   TAKE 1 TABLET BY MOUTH 4 TIMES DAILY AS NEEDED   • levothyroxine (SYNTHROID, LEVOTHROID) 75 MCG tablet Daily   • Multiple Vitamins-Minerals (PRESERVISION AREDS 2+MULTI VIT PO) PreserVision AREDS   • nateglinide (STARLIX) 120 mg, Oral, 3 Times Daily Before Meals   • Nutritional Supplements (GLUCERNA 1.5 MUSHTAQ PO) 2 times daily   • Omeprazole 20 MG tablet delayed-release As Needed   • pravastatin (PRAVACHOL) 20 MG tablet Daily   • vitamin D (ERGOCALCIFEROL) 50,000 Units, Oral, Every 14 Days       Assessment / Plan      Assessment & Plan:  1. Type 2 diabetes mellitus with hyperglycemia, without long-term current use of insulin ([facility])  A1c increased, but still reasonable <7.5%.  Continue neteglinide.  Encouraged nutritious dietary choices, moderation of carbohydrates, avoidance of added sugar and regular exercise.  - POC Glucose, Blood  - POC Glycosylated Hemoglobin (Hb A1C)  - nateglinide (STARLIX) 120 MG tablet; Take 1 tablet by mouth 3 (Three) Times a Day Before Meals.  Dispense: 270 tablet; Refill: 3      Return in about 6 months (around 2/15/2023) for next scheduled follow up. She was advised to contact the office with any interval questions or concerns.    TAMARA Tejada  Endocrinology  08/15/2022

## 2022-10-08 ENCOUNTER — HOSPITAL ENCOUNTER (OUTPATIENT)
Facility: HOSPITAL | Age: 77
Discharge: HOME | End: 2022-10-08
Payer: MEDICARE

## 2022-10-08 LAB
A/G RATIO: 1.7 (ref 0.8–2)
ALBUMIN SERPL-MCNC: 4.3 G/DL (ref 3.4–4.8)
ALP BLD-CCNC: 182 U/L (ref 25–100)
ALT SERPL-CCNC: 34 U/L (ref 4–36)
ANION GAP SERPL CALCULATED.3IONS-SCNC: 11 MMOL/L (ref 3–16)
AST SERPL-CCNC: 29 U/L (ref 8–33)
BACTERIA: ABNORMAL /HPF
BILIRUB SERPL-MCNC: 0.3 MG/DL (ref 0.3–1.2)
BILIRUBIN URINE: NEGATIVE
BLOOD, URINE: ABNORMAL
BUN BLDV-MCNC: 30 MG/DL (ref 6–20)
CALCIUM SERPL-MCNC: 10.4 MG/DL (ref 8.5–10.5)
CHLORIDE BLD-SCNC: 100 MMOL/L (ref 98–107)
CLARITY: ABNORMAL
CO2: 26 MMOL/L (ref 20–30)
COLOR: YELLOW
CREAT SERPL-MCNC: 1.2 MG/DL (ref 0.4–1.2)
CREATININE URINE: 131.5 MG/DL (ref 28–259)
EPITHELIAL CELLS, UA: ABNORMAL /HPF (ref 0–5)
GFR AFRICAN AMERICAN: 53
GFR NON-AFRICAN AMERICAN: 43
GLOBULIN: 2.5 G/DL
GLUCOSE BLD-MCNC: 179 MG/DL (ref 74–106)
GLUCOSE URINE: NEGATIVE MG/DL
HBA1C MFR BLD: 7.2 %
HCT VFR BLD CALC: 34.6 % (ref 37–47)
HEMOGLOBIN: 11.2 G/DL (ref 11.5–16.5)
KETONES, URINE: NEGATIVE MG/DL
LEUKOCYTE ESTERASE, URINE: ABNORMAL
MCH RBC QN AUTO: 31.2 PG (ref 27–32)
MCHC RBC AUTO-ENTMCNC: 32.4 G/DL (ref 31–35)
MCV RBC AUTO: 96.4 FL (ref 80–100)
MICROSCOPIC EXAMINATION: YES
NITRITE, URINE: NEGATIVE
PDW BLD-RTO: 13.2 % (ref 11–16)
PH UA: 5.5 (ref 5–8)
PLATELET # BLD: 130 K/UL (ref 150–400)
PMV BLD AUTO: 8.9 FL (ref 6–10)
POTASSIUM SERPL-SCNC: 5.5 MMOL/L (ref 3.4–5.1)
PROTEIN PROTEIN: 32 MG/DL
PROTEIN UA: NEGATIVE MG/DL
PROTEIN/CREAT RATIO: 0.2 MG/DL
RBC # BLD: 3.59 M/UL (ref 3.8–5.8)
RBC UA: ABNORMAL /HPF (ref 0–4)
SODIUM BLD-SCNC: 137 MMOL/L (ref 136–145)
SPECIFIC GRAVITY UA: 1.02 (ref 1–1.03)
TOTAL PROTEIN: 6.8 G/DL (ref 6.4–8.3)
TSH SERPL DL<=0.05 MIU/L-ACNC: 1.02 UIU/ML (ref 0.27–4.2)
URIC ACID, SERUM: 4.8 MG/DL (ref 2.5–7.1)
URINE TYPE: ABNORMAL
UROBILINOGEN, URINE: 0.2 E.U./DL
WBC # BLD: 8.4 K/UL (ref 4–11)
WBC UA: >100 /HPF (ref 0–5)

## 2022-10-08 PROCEDURE — 85027 COMPLETE CBC AUTOMATED: CPT

## 2022-10-08 PROCEDURE — 83036 HEMOGLOBIN GLYCOSYLATED A1C: CPT

## 2022-10-08 PROCEDURE — 80053 COMPREHEN METABOLIC PANEL: CPT

## 2022-10-08 PROCEDURE — 84550 ASSAY OF BLOOD/URIC ACID: CPT

## 2022-10-08 PROCEDURE — 82570 ASSAY OF URINE CREATININE: CPT

## 2022-10-08 PROCEDURE — 84156 ASSAY OF PROTEIN URINE: CPT

## 2022-10-08 PROCEDURE — 36415 COLL VENOUS BLD VENIPUNCTURE: CPT

## 2022-10-08 PROCEDURE — 82306 VITAMIN D 25 HYDROXY: CPT

## 2022-10-08 PROCEDURE — 81001 URINALYSIS AUTO W/SCOPE: CPT

## 2022-10-08 PROCEDURE — 84443 ASSAY THYROID STIM HORMONE: CPT

## 2022-10-10 LAB — VITAMIN D 25-HYDROXY: 46.1 (ref 32–100)

## 2023-02-07 ENCOUNTER — HOSPITAL ENCOUNTER (OUTPATIENT)
Dept: GENERAL RADIOLOGY | Facility: HOSPITAL | Age: 78
Discharge: HOME | End: 2023-02-07
Payer: MEDICARE

## 2023-02-07 ENCOUNTER — HOSPITAL ENCOUNTER (OUTPATIENT)
Facility: HOSPITAL | Age: 78
Discharge: HOME | End: 2023-02-07
Payer: MEDICARE

## 2023-02-07 DIAGNOSIS — R05.9 COMPLAINING OF COUGH: ICD-10-CM

## 2023-02-07 PROCEDURE — 71046 X-RAY EXAM CHEST 2 VIEWS: CPT

## 2023-02-08 DIAGNOSIS — E11.65 TYPE 2 DIABETES MELLITUS WITH HYPERGLYCEMIA, WITHOUT LONG-TERM CURRENT USE OF INSULIN: Chronic | ICD-10-CM

## 2023-02-08 RX ORDER — CARVEDILOL 25 MG/1
TABLET, FILM COATED ORAL
Qty: 100 EACH | Refills: 5 | Status: SHIPPED | OUTPATIENT
Start: 2023-02-08

## 2023-05-14 ENCOUNTER — HOSPITAL ENCOUNTER (INPATIENT)
Facility: HOSPITAL | Age: 78
LOS: 1 days | Discharge: HOME | End: 2023-05-17
Attending: EMERGENCY MEDICINE | Admitting: INTERNAL MEDICINE
Payer: MEDICARE

## 2023-05-14 ENCOUNTER — APPOINTMENT (OUTPATIENT)
Dept: CT IMAGING | Facility: HOSPITAL | Age: 78
End: 2023-05-14
Payer: MEDICARE

## 2023-05-14 DIAGNOSIS — E86.0 DEHYDRATION: ICD-10-CM

## 2023-05-14 DIAGNOSIS — A08.4 VIRAL GASTROENTERITIS: ICD-10-CM

## 2023-05-14 DIAGNOSIS — R11.2 INTRACTABLE NAUSEA AND VOMITING: Primary | ICD-10-CM

## 2023-05-14 PROBLEM — K29.70 GASTRITIS WITHOUT BLEEDING, UNSPECIFIED CHRONICITY, UNSPECIFIED GASTRITIS TYPE: Status: ACTIVE | Noted: 2023-05-14

## 2023-05-14 LAB
ALBUMIN SERPL-MCNC: 4.1 G/DL (ref 3.4–4.8)
ALBUMIN/GLOB SERPL: 1.3 {RATIO} (ref 0.8–2)
ALP SERPL-CCNC: 171 U/L (ref 25–100)
ALT SERPL-CCNC: 15 U/L (ref 4–36)
ANION GAP SERPL CALCULATED.3IONS-SCNC: 17 MMOL/L (ref 3–16)
AST SERPL-CCNC: 22 U/L (ref 8–33)
BASOPHILS # BLD: 0 K/UL (ref 0–0.1)
BASOPHILS NFR BLD: 0.4 %
BILIRUB SERPL-MCNC: 0.4 MG/DL (ref 0.3–1.2)
BILIRUB UR QL STRIP.AUTO: NEGATIVE
BUN SERPL-MCNC: 22 MG/DL (ref 6–20)
CALCIUM SERPL-MCNC: 10.3 MG/DL (ref 8.5–10.5)
CHLORIDE SERPL-SCNC: 98 MMOL/L (ref 98–107)
CLARITY UR: CLEAR
CO2 SERPL-SCNC: 23 MMOL/L (ref 20–30)
COLOR UR: YELLOW
CREAT SERPL-MCNC: 1 MG/DL (ref 0.4–1.2)
EOSINOPHIL # BLD: 0.1 K/UL (ref 0–0.4)
EOSINOPHIL NFR BLD: 0.5 %
ERYTHROCYTE [DISTWIDTH] IN BLOOD BY AUTOMATED COUNT: 14.7 % (ref 11–16)
FLUAV AG NPH QL: NEGATIVE
FLUBV AG NPH QL: NEGATIVE
GFR SERPLBLD CREATININE-BSD FMLA CKD-EPI: 57 ML/MIN/{1.73_M2}
GLOBULIN SER CALC-MCNC: 3.2 G/DL
GLUCOSE SERPL-MCNC: 177 MG/DL (ref 74–106)
GLUCOSE UR STRIP.AUTO-MCNC: NEGATIVE MG/DL
HCT VFR BLD AUTO: 37.4 % (ref 37–47)
HGB BLD-MCNC: 12.5 G/DL (ref 11.5–16.5)
HGB UR QL STRIP.AUTO: NEGATIVE
IMM GRANULOCYTES # BLD: 0.1 K/UL
IMM GRANULOCYTES NFR BLD: 0.5 % (ref 0–5)
KETONES UR STRIP.AUTO-MCNC: 15 MG/DL
LACTATE BLDV-SCNC: 2.2 MMOL/L (ref 0.4–2)
LEUKOCYTE ESTERASE UR QL STRIP.AUTO: NEGATIVE
LIPASE SERPL-CCNC: 40 U/L (ref 5.6–51.3)
LYMPHOCYTES # BLD: 1.5 K/UL (ref 1.5–4)
LYMPHOCYTES NFR BLD: 14.1 %
MAGNESIUM SERPL-MCNC: 1.8 MG/DL (ref 1.7–2.4)
MCH RBC QN AUTO: 30.6 PG (ref 27–32)
MCHC RBC AUTO-ENTMCNC: 33.4 G/DL (ref 31–35)
MCV RBC AUTO: 91.7 FL (ref 80–100)
MONOCYTES # BLD: 0.5 K/UL (ref 0.2–0.8)
MONOCYTES NFR BLD: 4.9 %
NEUTROPHILS # BLD: 8.6 K/UL (ref 2–7.5)
NEUTS SEG NFR BLD: 79.6 %
NITRITE UR QL STRIP.AUTO: NEGATIVE
PH UR STRIP.AUTO: 7 [PH] (ref 5–8)
PLATELET # BLD AUTO: 168 K/UL (ref 150–400)
PMV BLD AUTO: 9.3 FL (ref 6–10)
POTASSIUM SERPL-SCNC: 4.4 MMOL/L (ref 3.4–5.1)
PROT SERPL-MCNC: 7.3 G/DL (ref 6.4–8.3)
PROT UR STRIP.AUTO-MCNC: NEGATIVE MG/DL
RBC # BLD AUTO: 4.08 M/UL (ref 3.8–5.8)
SARS-COV-2 RDRP RESP QL NAA+PROBE: NOT DETECTED
SODIUM SERPL-SCNC: 138 MMOL/L (ref 136–145)
SP GR UR STRIP.AUTO: 1.01 (ref 1–1.03)
TROPONIN, HIGH SENSITIVITY: 10 NG/L (ref 0–14)
UA DIPSTICK W REFLEX MICRO PNL UR: ABNORMAL
URN SPEC COLLECT METH UR: ABNORMAL
UROBILINOGEN UR STRIP-ACNC: 0.2 E.U./DL
WBC # BLD AUTO: 10.8 K/UL (ref 4–11)

## 2023-05-14 PROCEDURE — 6360000004 HC RX CONTRAST MEDICATION: Performed by: EMERGENCY MEDICINE

## 2023-05-14 PROCEDURE — 6360000002 HC RX W HCPCS

## 2023-05-14 PROCEDURE — 87635 SARS-COV-2 COVID-19 AMP PRB: CPT

## 2023-05-14 PROCEDURE — 51701 INSERT BLADDER CATHETER: CPT

## 2023-05-14 PROCEDURE — 99285 EMERGENCY DEPT VISIT HI MDM: CPT

## 2023-05-14 PROCEDURE — 74177 CT ABD & PELVIS W/CONTRAST: CPT

## 2023-05-14 PROCEDURE — 83735 ASSAY OF MAGNESIUM: CPT

## 2023-05-14 PROCEDURE — 2580000003 HC RX 258: Performed by: EMERGENCY MEDICINE

## 2023-05-14 PROCEDURE — 87804 INFLUENZA ASSAY W/OPTIC: CPT

## 2023-05-14 PROCEDURE — 6360000002 HC RX W HCPCS: Performed by: EMERGENCY MEDICINE

## 2023-05-14 PROCEDURE — 70450 CT HEAD/BRAIN W/O DYE: CPT

## 2023-05-14 PROCEDURE — G0378 HOSPITAL OBSERVATION PER HR: HCPCS

## 2023-05-14 PROCEDURE — 83605 ASSAY OF LACTIC ACID: CPT

## 2023-05-14 PROCEDURE — 84484 ASSAY OF TROPONIN QUANT: CPT

## 2023-05-14 PROCEDURE — 80053 COMPREHEN METABOLIC PANEL: CPT

## 2023-05-14 PROCEDURE — 36415 COLL VENOUS BLD VENIPUNCTURE: CPT

## 2023-05-14 PROCEDURE — 93005 ELECTROCARDIOGRAM TRACING: CPT

## 2023-05-14 PROCEDURE — 96376 TX/PRO/DX INJ SAME DRUG ADON: CPT

## 2023-05-14 PROCEDURE — 96361 HYDRATE IV INFUSION ADD-ON: CPT

## 2023-05-14 PROCEDURE — 85025 COMPLETE CBC W/AUTO DIFF WBC: CPT

## 2023-05-14 PROCEDURE — 96375 TX/PRO/DX INJ NEW DRUG ADDON: CPT

## 2023-05-14 PROCEDURE — 81003 URINALYSIS AUTO W/O SCOPE: CPT

## 2023-05-14 PROCEDURE — 83690 ASSAY OF LIPASE: CPT

## 2023-05-14 PROCEDURE — 96365 THER/PROPH/DIAG IV INF INIT: CPT

## 2023-05-14 RX ORDER — ONDANSETRON 2 MG/ML
4 INJECTION INTRAMUSCULAR; INTRAVENOUS ONCE
Status: COMPLETED | OUTPATIENT
Start: 2023-05-14 | End: 2023-05-14

## 2023-05-14 RX ORDER — ONDANSETRON 4 MG/1
4 TABLET, ORALLY DISINTEGRATING ORAL EVERY 8 HOURS PRN
Status: DISCONTINUED | OUTPATIENT
Start: 2023-05-14 | End: 2023-05-17 | Stop reason: HOSPADM

## 2023-05-14 RX ORDER — SODIUM CHLORIDE 9 MG/ML
INJECTION, SOLUTION INTRAVENOUS PRN
Status: DISCONTINUED | OUTPATIENT
Start: 2023-05-14 | End: 2023-05-17 | Stop reason: HOSPADM

## 2023-05-14 RX ORDER — IPRATROPIUM BROMIDE AND ALBUTEROL SULFATE 2.5; .5 MG/3ML; MG/3ML
1 SOLUTION RESPIRATORY (INHALATION) EVERY 4 HOURS PRN
Status: DISCONTINUED | OUTPATIENT
Start: 2023-05-14 | End: 2023-05-17 | Stop reason: HOSPADM

## 2023-05-14 RX ORDER — POLYETHYLENE GLYCOL 3350 17 G/17G
17 POWDER, FOR SOLUTION ORAL DAILY PRN
Status: DISCONTINUED | OUTPATIENT
Start: 2023-05-14 | End: 2023-05-17 | Stop reason: HOSPADM

## 2023-05-14 RX ORDER — ACETAMINOPHEN 325 MG/1
650 TABLET ORAL EVERY 6 HOURS PRN
Status: DISCONTINUED | OUTPATIENT
Start: 2023-05-14 | End: 2023-05-17 | Stop reason: HOSPADM

## 2023-05-14 RX ORDER — DOXAZOSIN MESYLATE 4 MG/1
8 TABLET ORAL NIGHTLY
Status: DISCONTINUED | OUTPATIENT
Start: 2023-05-15 | End: 2023-05-17 | Stop reason: HOSPADM

## 2023-05-14 RX ORDER — ONDANSETRON 2 MG/ML
4 INJECTION INTRAMUSCULAR; INTRAVENOUS EVERY 6 HOURS PRN
Status: DISCONTINUED | OUTPATIENT
Start: 2023-05-14 | End: 2023-05-17 | Stop reason: HOSPADM

## 2023-05-14 RX ORDER — LEVOTHYROXINE SODIUM 0.07 MG/1
75 TABLET ORAL DAILY
COMMUNITY

## 2023-05-14 RX ORDER — ERGOCALCIFEROL 1.25 MG/1
50000 CAPSULE ORAL WEEKLY
Status: DISCONTINUED | OUTPATIENT
Start: 2023-05-21 | End: 2023-05-17 | Stop reason: HOSPADM

## 2023-05-14 RX ORDER — ACETAMINOPHEN 650 MG/1
650 SUPPOSITORY RECTAL EVERY 6 HOURS PRN
Status: DISCONTINUED | OUTPATIENT
Start: 2023-05-14 | End: 2023-05-17 | Stop reason: HOSPADM

## 2023-05-14 RX ORDER — ALLOPURINOL 100 MG/1
100 TABLET ORAL DAILY
Status: DISCONTINUED | OUTPATIENT
Start: 2023-05-15 | End: 2023-05-17 | Stop reason: HOSPADM

## 2023-05-14 RX ORDER — ENOXAPARIN SODIUM 100 MG/ML
40 INJECTION SUBCUTANEOUS DAILY
Status: DISCONTINUED | OUTPATIENT
Start: 2023-05-15 | End: 2023-05-17 | Stop reason: HOSPADM

## 2023-05-14 RX ORDER — 0.9 % SODIUM CHLORIDE 0.9 %
1000 INTRAVENOUS SOLUTION INTRAVENOUS ONCE
Status: COMPLETED | OUTPATIENT
Start: 2023-05-14 | End: 2023-05-14

## 2023-05-14 RX ORDER — MORPHINE SULFATE 2 MG/ML
2 INJECTION, SOLUTION INTRAMUSCULAR; INTRAVENOUS ONCE
Status: COMPLETED | OUTPATIENT
Start: 2023-05-14 | End: 2023-05-14

## 2023-05-14 RX ORDER — PRAVASTATIN SODIUM 20 MG
20 TABLET ORAL DAILY
Status: DISCONTINUED | OUTPATIENT
Start: 2023-05-15 | End: 2023-05-17 | Stop reason: HOSPADM

## 2023-05-14 RX ORDER — SODIUM CHLORIDE 9 MG/ML
INJECTION, SOLUTION INTRAVENOUS CONTINUOUS
Status: DISCONTINUED | OUTPATIENT
Start: 2023-05-15 | End: 2023-05-15

## 2023-05-14 RX ORDER — AMITRIPTYLINE HYDROCHLORIDE 25 MG/1
50 TABLET, FILM COATED ORAL NIGHTLY
Status: DISCONTINUED | OUTPATIENT
Start: 2023-05-15 | End: 2023-05-17 | Stop reason: HOSPADM

## 2023-05-14 RX ORDER — SODIUM CHLORIDE 0.9 % (FLUSH) 0.9 %
5-40 SYRINGE (ML) INJECTION EVERY 12 HOURS SCHEDULED
Status: DISCONTINUED | OUTPATIENT
Start: 2023-05-15 | End: 2023-05-17 | Stop reason: HOSPADM

## 2023-05-14 RX ORDER — PROCHLORPERAZINE EDISYLATE 5 MG/ML
10 INJECTION INTRAMUSCULAR; INTRAVENOUS EVERY 6 HOURS PRN
Status: DISCONTINUED | OUTPATIENT
Start: 2023-05-14 | End: 2023-05-17 | Stop reason: HOSPADM

## 2023-05-14 RX ORDER — REPAGLINIDE 1 MG/1
1 TABLET ORAL
Status: DISCONTINUED | OUTPATIENT
Start: 2023-05-15 | End: 2023-05-17 | Stop reason: HOSPADM

## 2023-05-14 RX ORDER — PROMETHAZINE HYDROCHLORIDE 25 MG/ML
12.5 INJECTION, SOLUTION INTRAMUSCULAR; INTRAVENOUS ONCE
Status: DISCONTINUED | OUTPATIENT
Start: 2023-05-14 | End: 2023-05-14

## 2023-05-14 RX ORDER — SODIUM CHLORIDE 0.9 % (FLUSH) 0.9 %
5-40 SYRINGE (ML) INJECTION PRN
Status: DISCONTINUED | OUTPATIENT
Start: 2023-05-14 | End: 2023-05-17 | Stop reason: HOSPADM

## 2023-05-14 RX ORDER — LEVOTHYROXINE SODIUM 0.07 MG/1
75 TABLET ORAL DAILY
Status: DISCONTINUED | OUTPATIENT
Start: 2023-05-15 | End: 2023-05-17 | Stop reason: HOSPADM

## 2023-05-14 RX ORDER — GABAPENTIN 300 MG/1
300 CAPSULE ORAL 3 TIMES DAILY
Status: DISCONTINUED | OUTPATIENT
Start: 2023-05-15 | End: 2023-05-17 | Stop reason: HOSPADM

## 2023-05-14 RX ORDER — ONDANSETRON 2 MG/ML
INJECTION INTRAMUSCULAR; INTRAVENOUS
Status: COMPLETED
Start: 2023-05-14 | End: 2023-05-14

## 2023-05-14 RX ORDER — ERGOCALCIFEROL 1.25 MG/1
50000 CAPSULE ORAL WEEKLY
Status: DISCONTINUED | OUTPATIENT
Start: 2023-05-15 | End: 2023-05-14

## 2023-05-14 RX ADMIN — IOPAMIDOL 100 ML: 755 INJECTION, SOLUTION INTRAVENOUS at 19:51

## 2023-05-14 RX ADMIN — Medication 12.5 MG: at 22:10

## 2023-05-14 RX ADMIN — SODIUM CHLORIDE 1000 ML: 9 INJECTION, SOLUTION INTRAVENOUS at 18:50

## 2023-05-14 RX ADMIN — MORPHINE SULFATE 2 MG: 2 INJECTION, SOLUTION INTRAMUSCULAR; INTRAVENOUS at 18:50

## 2023-05-14 RX ADMIN — ONDANSETRON 4 MG: 2 INJECTION INTRAMUSCULAR; INTRAVENOUS at 19:33

## 2023-05-14 RX ADMIN — PROCHLORPERAZINE EDISYLATE 10 MG: 5 INJECTION INTRAMUSCULAR; INTRAVENOUS at 20:28

## 2023-05-14 RX ADMIN — ONDANSETRON 4 MG: 2 INJECTION INTRAMUSCULAR; INTRAVENOUS at 18:50

## 2023-05-14 RX ADMIN — SODIUM CHLORIDE 1000 ML: 9 INJECTION, SOLUTION INTRAVENOUS at 20:28

## 2023-05-14 RX ADMIN — ONDANSETRON 4 MG: 2 INJECTION INTRAMUSCULAR; INTRAVENOUS at 21:38

## 2023-05-14 RX ADMIN — Medication 12.5 MG: at 23:38

## 2023-05-15 PROBLEM — Z85.819: Status: ACTIVE | Noted: 2023-05-15

## 2023-05-15 PROBLEM — K52.9 GASTROENTERITIS: Status: ACTIVE | Noted: 2023-05-14

## 2023-05-15 PROBLEM — R40.4 TRANSIENT ALTERATION OF AWARENESS: Status: RESOLVED | Noted: 2018-08-14 | Resolved: 2023-05-15

## 2023-05-15 PROBLEM — E53.8 B12 DEFICIENCY: Status: RESOLVED | Noted: 2018-08-15 | Resolved: 2023-05-15

## 2023-05-15 PROBLEM — R78.81 BACTEREMIA: Status: RESOLVED | Noted: 2018-08-14 | Resolved: 2023-05-15

## 2023-05-15 PROBLEM — N13.30 HYDRONEPHROSIS: Status: RESOLVED | Noted: 2018-08-14 | Resolved: 2023-05-15

## 2023-05-15 LAB
ANION GAP SERPL CALCULATED.3IONS-SCNC: 9 MMOL/L (ref 3–16)
BASOPHILS # BLD: 0 K/UL (ref 0–0.1)
BASOPHILS NFR BLD: 0.4 %
BUN SERPL-MCNC: 16 MG/DL (ref 6–20)
CALCIUM SERPL-MCNC: 8.4 MG/DL (ref 8.5–10.5)
CHLORIDE SERPL-SCNC: 108 MMOL/L (ref 98–107)
CO2 SERPL-SCNC: 23 MMOL/L (ref 20–30)
CREAT SERPL-MCNC: 0.9 MG/DL (ref 0.4–1.2)
EOSINOPHIL # BLD: 0.1 K/UL (ref 0–0.4)
EOSINOPHIL NFR BLD: 0.7 %
ERYTHROCYTE [DISTWIDTH] IN BLOOD BY AUTOMATED COUNT: 14.8 % (ref 11–16)
FOLATE SERPL-MCNC: >20 NG/ML
GFR SERPLBLD CREATININE-BSD FMLA CKD-EPI: >60 ML/MIN/{1.73_M2}
GLUCOSE BLD-MCNC: 124 MG/DL (ref 74–106)
GLUCOSE BLD-MCNC: 147 MG/DL (ref 74–106)
GLUCOSE BLD-MCNC: 187 MG/DL (ref 74–106)
GLUCOSE BLD-MCNC: 204 MG/DL (ref 74–106)
GLUCOSE SERPL-MCNC: 124 MG/DL (ref 74–106)
HAV IGM SERPL QL IA: NORMAL
HBA1C MFR BLD: 6.9 %
HBV CORE IGM SERPL QL IA: NORMAL
HBV SURFACE AG SERPL QL IA: NORMAL
HCT VFR BLD AUTO: 31.3 % (ref 37–47)
HCV AB SERPL QL IA: NORMAL
HGB BLD-MCNC: 10.1 G/DL (ref 11.5–16.5)
IMM GRANULOCYTES # BLD: 0 K/UL
IMM GRANULOCYTES NFR BLD: 0.3 % (ref 0–5)
IRON SATN MFR SERPL: 32 % (ref 15–50)
IRON SERPL-MCNC: 75 UG/DL (ref 37–145)
LYMPHOCYTES # BLD: 2.2 K/UL (ref 1.5–4)
LYMPHOCYTES NFR BLD: 21.4 %
MCH RBC QN AUTO: 30.1 PG (ref 27–32)
MCHC RBC AUTO-ENTMCNC: 32.3 G/DL (ref 31–35)
MCV RBC AUTO: 93.4 FL (ref 80–100)
MONOCYTES # BLD: 0.9 K/UL (ref 0.2–0.8)
MONOCYTES NFR BLD: 8.8 %
NEUTROPHILS # BLD: 7 K/UL (ref 2–7.5)
NEUTS SEG NFR BLD: 68.4 %
PERFORMED ON: ABNORMAL
PLATELET # BLD AUTO: 133 K/UL (ref 150–400)
PMV BLD AUTO: 9 FL (ref 6–10)
POTASSIUM SERPL-SCNC: 4 MMOL/L (ref 3.4–5.1)
RBC # BLD AUTO: 3.35 M/UL (ref 3.8–5.8)
SODIUM SERPL-SCNC: 140 MMOL/L (ref 136–145)
TIBC SERPL-MCNC: 237 UG/DL (ref 250–450)
VIT B12 SERPL-MCNC: 1443 PG/ML (ref 211–911)
WBC # BLD AUTO: 10.3 K/UL (ref 4–11)

## 2023-05-15 PROCEDURE — 6370000000 HC RX 637 (ALT 250 FOR IP): Performed by: INTERNAL MEDICINE

## 2023-05-15 PROCEDURE — 85025 COMPLETE CBC W/AUTO DIFF WBC: CPT

## 2023-05-15 PROCEDURE — 83036 HEMOGLOBIN GLYCOSYLATED A1C: CPT

## 2023-05-15 PROCEDURE — 83540 ASSAY OF IRON: CPT

## 2023-05-15 PROCEDURE — 82607 VITAMIN B-12: CPT

## 2023-05-15 PROCEDURE — 83550 IRON BINDING TEST: CPT

## 2023-05-15 PROCEDURE — 2580000003 HC RX 258: Performed by: INTERNAL MEDICINE

## 2023-05-15 PROCEDURE — 99222 1ST HOSP IP/OBS MODERATE 55: CPT | Performed by: INTERNAL MEDICINE

## 2023-05-15 PROCEDURE — 82746 ASSAY OF FOLIC ACID SERUM: CPT

## 2023-05-15 PROCEDURE — 2580000003 HC RX 258: Performed by: PHYSICIAN ASSISTANT

## 2023-05-15 PROCEDURE — G0378 HOSPITAL OBSERVATION PER HR: HCPCS

## 2023-05-15 PROCEDURE — 36415 COLL VENOUS BLD VENIPUNCTURE: CPT

## 2023-05-15 PROCEDURE — 6370000000 HC RX 637 (ALT 250 FOR IP): Performed by: PHYSICIAN ASSISTANT

## 2023-05-15 PROCEDURE — 97116 GAIT TRAINING THERAPY: CPT

## 2023-05-15 PROCEDURE — 80074 ACUTE HEPATITIS PANEL: CPT

## 2023-05-15 PROCEDURE — 97165 OT EVAL LOW COMPLEX 30 MIN: CPT

## 2023-05-15 PROCEDURE — 6360000002 HC RX W HCPCS: Performed by: INTERNAL MEDICINE

## 2023-05-15 PROCEDURE — 97161 PT EVAL LOW COMPLEX 20 MIN: CPT

## 2023-05-15 PROCEDURE — 80048 BASIC METABOLIC PNL TOTAL CA: CPT

## 2023-05-15 PROCEDURE — 2500000003 HC RX 250 WO HCPCS: Performed by: PHYSICIAN ASSISTANT

## 2023-05-15 RX ORDER — INSULIN LISPRO 100 [IU]/ML
0-4 INJECTION, SOLUTION INTRAVENOUS; SUBCUTANEOUS
Status: DISCONTINUED | OUTPATIENT
Start: 2023-05-15 | End: 2023-05-17 | Stop reason: HOSPADM

## 2023-05-15 RX ORDER — CARVEDILOL 25 MG/1
1 TABLET, FILM COATED ORAL DAILY
COMMUNITY
Start: 2023-02-08

## 2023-05-15 RX ORDER — INSULIN LISPRO 100 [IU]/ML
0-4 INJECTION, SOLUTION INTRAVENOUS; SUBCUTANEOUS NIGHTLY
Status: DISCONTINUED | OUTPATIENT
Start: 2023-05-15 | End: 2023-05-17 | Stop reason: HOSPADM

## 2023-05-15 RX ORDER — DEXTROSE MONOHYDRATE 100 MG/ML
250 INJECTION, SOLUTION INTRAVENOUS PRN
Status: DISCONTINUED | OUTPATIENT
Start: 2023-05-15 | End: 2023-05-17 | Stop reason: HOSPADM

## 2023-05-15 RX ORDER — DEXTROSE MONOHYDRATE 100 MG/ML
125 INJECTION, SOLUTION INTRAVENOUS PRN
Status: DISCONTINUED | OUTPATIENT
Start: 2023-05-15 | End: 2023-05-17 | Stop reason: HOSPADM

## 2023-05-15 RX ORDER — SODIUM CHLORIDE 9 MG/ML
INJECTION, SOLUTION INTRAVENOUS CONTINUOUS
Status: DISCONTINUED | OUTPATIENT
Start: 2023-05-15 | End: 2023-05-17

## 2023-05-15 RX ORDER — METRONIDAZOLE 500 MG/100ML
500 INJECTION, SOLUTION INTRAVENOUS EVERY 8 HOURS
Status: DISCONTINUED | OUTPATIENT
Start: 2023-05-15 | End: 2023-05-17

## 2023-05-15 RX ORDER — HYDRALAZINE HYDROCHLORIDE 25 MG/1
25 TABLET, FILM COATED ORAL 2 TIMES DAILY
Status: DISCONTINUED | OUTPATIENT
Start: 2023-05-15 | End: 2023-05-17 | Stop reason: HOSPADM

## 2023-05-15 RX ORDER — DEXTROSE MONOHYDRATE 100 MG/ML
INJECTION, SOLUTION INTRAVENOUS CONTINUOUS PRN
Status: DISCONTINUED | OUTPATIENT
Start: 2023-05-15 | End: 2023-05-17 | Stop reason: HOSPADM

## 2023-05-15 RX ADMIN — AMITRIPTYLINE HYDROCHLORIDE 50 MG: 25 TABLET, FILM COATED ORAL at 21:26

## 2023-05-15 RX ADMIN — HYDRALAZINE HYDROCHLORIDE 25 MG: 25 TABLET, FILM COATED ORAL at 21:28

## 2023-05-15 RX ADMIN — GABAPENTIN 300 MG: 300 CAPSULE ORAL at 08:24

## 2023-05-15 RX ADMIN — METRONIDAZOLE 500 MG: 500 INJECTION, SOLUTION INTRAVENOUS at 13:16

## 2023-05-15 RX ADMIN — ENOXAPARIN SODIUM 40 MG: 100 INJECTION SUBCUTANEOUS at 08:23

## 2023-05-15 RX ADMIN — LEVOTHYROXINE SODIUM 75 MCG: 0.07 TABLET ORAL at 06:05

## 2023-05-15 RX ADMIN — METRONIDAZOLE 500 MG: 500 INJECTION, SOLUTION INTRAVENOUS at 21:31

## 2023-05-15 RX ADMIN — GABAPENTIN 300 MG: 300 CAPSULE ORAL at 13:12

## 2023-05-15 RX ADMIN — DOXAZOSIN 8 MG: 4 TABLET ORAL at 21:26

## 2023-05-15 RX ADMIN — PRAVASTATIN SODIUM 20 MG: 20 TABLET ORAL at 08:24

## 2023-05-15 RX ADMIN — ALLOPURINOL 100 MG: 100 TABLET ORAL at 08:24

## 2023-05-15 RX ADMIN — SODIUM CHLORIDE: 9 INJECTION, SOLUTION INTRAVENOUS at 12:45

## 2023-05-15 RX ADMIN — GABAPENTIN 300 MG: 300 CAPSULE ORAL at 21:26

## 2023-05-15 RX ADMIN — SODIUM CHLORIDE: 9 INJECTION, SOLUTION INTRAVENOUS at 00:19

## 2023-05-15 RX ADMIN — AMITRIPTYLINE HYDROCHLORIDE 50 MG: 25 TABLET, FILM COATED ORAL at 00:20

## 2023-05-15 RX ADMIN — DOXAZOSIN 8 MG: 4 TABLET ORAL at 00:20

## 2023-05-15 NOTE — H&P
(moderate) ([facility] [N18.30]  Try to avoid any nephrotoxic agent. Monitor renal function, electrolytes and urine output. 02/16/2016    Essential hypertension [I10]  Blood pressure was elevated on arrival but much better since. Still fluctuating around the upper normal limit. Try to get blood pressure under better control specially with her renal failure. 02/16/2016    RANI (acute kidney injury) ([facility] [N17.9]  Try to avoid any nephrotoxic agent. Support with IV fluid. Monitor electrolytes. 05/04/2015    Type 2 diabetes mellitus ([facility] [E11.9]  Seems to be stable. A1c is 6.9. Continue current regimen. Monitor fingersticks and cover with/scale insulin if needed. 05/04/2015    Anemia [D64.9]  Hemoglobin trended down with IV fluid. Proceed with anemia work-up. Further recommendation based on test results.    05/04/2015         Refugio Hernandez MD certifies per CMS regulation for 42 CFR [phone]), that the patient may reasonably be expected to be discharged or transferred to a hospital within 96 hours after admission to [address] [place] [address]      Electronically signed by Refugio Hernandez MD on 5/15/2023 at 10:52 PM

## 2023-05-15 NOTE — ED PROVIDER NOTES
05/15/23 0900 05/14/23 2353  enoxaparin (LOVENOX) injection 40 mg  DAILY        Question:  Indication of Use  Answer:  Prophylaxis-DVT/PE    Acknowledged Ryan Every    05/15/23 0700 05/14/23 2353  levothyroxine (SYNTHROID) tablet 75 mcg  DAILY         Acknowledged Ryan Every    05/15/23 0700 05/14/23 2353  [Held by provider]  repaglinide (PRANDIN) tablet 1 mg  3 TIMES DAILY BEFORE MEALS        (Held by provider since Sun 5/14/2023 at 2353 by Marybel Castellanos LPN. Hold Reason: Other)    Last MAR action: Automatically Held on 05/18/23 at [facility]    05/15/23 0015 05/14/23 2353  amitriptyline (ELAVIL) tablet 50 mg  NIGHTLY         Last MAR action: Given - by [facility] on 05/15/23 at [address] [facility]    05/15/23 0015 05/14/23 2353  doxazosin (CARDURA) tablet 8 mg  NIGHTLY         Last MAR action: Given - by [facility] on 05/15/23 at [address] [facility]    05/15/23 0015 05/14/23 2353  0.9 % sodium chloride infusion  CONTINUOUS         Last MAR action: New Bag - by [facility] on 05/15/23 at [address]    05/14/23 2353 05/14/23 2353  sodium chloride flush 0.9 % injection 5-40 mL  PRN         Acknowledged Ryan Every    05/14/23 2353 05/14/23 2353  0.9 % sodium chloride infusion  PRN         Acknowledged Ryan Every    05/14/23 2353 05/14/23 2353  ondansetron (ZOFRAN-ODT) disintegrating tablet 4 mg  EVERY 8 HOURS PRN        See Hyperspace for full Linked Orders Report. Acknowledged Ryan Every    05/14/23 2353 05/14/23 2353  ondansetron (ZOFRAN) injection 4 mg  EVERY 6 HOURS PRN        See Hyperspace for full Linked Orders Report.     Acknowledged Ryan Every    05/14/23 2353 05/14/23 2353  polyethylene glycol (GLYCOLAX) packet 17 g  DAILY PRN         Acknowledged Ryan Every    05/14/23 2353 05/14/23 6083  acetaminophen (TYLENOL) tablet 650 mg  EVERY 6 HOURS PRN        See Hyperspace for full Linked

## 2023-05-15 NOTE — CARE COORDINATION
Lives With: Spouse  Type of Home: House  Home Layout: One level  Home Access: Stairs to enter without rails  Entrance Stairs - Number of Steps: 1 IMAN  Bathroom Shower/Tub: Tub/Shower unit  Bathroom Toilet: Standard  Bathroom Equipment: Shower chair, Grab bars in shower  Bathroom Accessibility: Not accessible  Has the patient had two or more falls in the past year or any fall with injury in the past year?: No  ADL Assistance: [address] [place] [address] Avenue: Independent  Homemaking Responsibilities: Yes  Ambulation Assistance: Independent  Transfer Assistance: Independent  Active : Yes  Patient's  Info: Spouse drives     Lives with SO. Has SC, grab bars. I at baseline. No DME needs noted per therapy.

## 2023-05-15 NOTE — ED NOTES
Can cancel repeat troponin per Dr. Rylee Martinez at this time.       Rivera Rossi RN  05/14/23 0135

## 2023-05-15 NOTE — ED NOTES
Straight-cath performed for UA sample at this time. Pt tolerated well.       Bravo Hoffman RN  05/14/23 0988

## 2023-05-15 NOTE — ED NOTES
Pt awake, no current complaints at this time. Pt and daughter agreeable to admission to medical unit.       Abelardo Barrow RN  05/14/23 1486

## 2023-05-16 PROBLEM — E83.42 HYPOMAGNESEMIA: Status: ACTIVE | Noted: 2023-05-16

## 2023-05-16 PROBLEM — K29.70 GASTRITIS WITHOUT BLEEDING, UNSPECIFIED CHRONICITY, UNSPECIFIED GASTRITIS TYPE: Status: ACTIVE | Noted: 2023-05-16

## 2023-05-16 PROBLEM — E87.6 HYPOKALEMIA: Status: ACTIVE | Noted: 2023-05-16

## 2023-05-16 LAB
ALBUMIN SERPL-MCNC: 3 G/DL (ref 3.4–4.8)
ALBUMIN/GLOB SERPL: 1.4 {RATIO} (ref 0.8–2)
ALP SERPL-CCNC: 107 U/L (ref 25–100)
ALT SERPL-CCNC: 9 U/L (ref 4–36)
ANION GAP SERPL CALCULATED.3IONS-SCNC: 8 MMOL/L (ref 3–16)
AST SERPL-CCNC: 12 U/L (ref 8–33)
BILIRUB SERPL-MCNC: <0.2 MG/DL (ref 0.3–1.2)
BUN SERPL-MCNC: 9 MG/DL (ref 6–20)
C DIFF TOX A+B STL QL IA: NORMAL
CALCIUM SERPL-MCNC: 8.3 MG/DL (ref 8.5–10.5)
CHLORIDE SERPL-SCNC: 112 MMOL/L (ref 98–107)
CO2 SERPL-SCNC: 22 MMOL/L (ref 20–30)
CREAT SERPL-MCNC: 0.9 MG/DL (ref 0.4–1.2)
ERYTHROCYTE [DISTWIDTH] IN BLOOD BY AUTOMATED COUNT: 14.9 % (ref 11–16)
GFR SERPLBLD CREATININE-BSD FMLA CKD-EPI: >60 ML/MIN/{1.73_M2}
GLOBULIN SER CALC-MCNC: 2.2 G/DL
GLUCOSE BLD-MCNC: 178 MG/DL (ref 74–106)
GLUCOSE BLD-MCNC: 180 MG/DL (ref 74–106)
GLUCOSE BLD-MCNC: 214 MG/DL (ref 74–106)
GLUCOSE BLD-MCNC: 81 MG/DL (ref 74–106)
GLUCOSE SERPL-MCNC: 92 MG/DL (ref 74–106)
HCT VFR BLD AUTO: 29.9 % (ref 37–47)
HGB BLD-MCNC: 9.6 G/DL (ref 11.5–16.5)
MAGNESIUM SERPL-MCNC: 1.6 MG/DL (ref 1.7–2.4)
MCH RBC QN AUTO: 30.4 PG (ref 27–32)
MCHC RBC AUTO-ENTMCNC: 32.1 G/DL (ref 31–35)
MCV RBC AUTO: 94.6 FL (ref 80–100)
PERFORMED ON: ABNORMAL
PERFORMED ON: NORMAL
PLATELET # BLD AUTO: 116 K/UL (ref 150–400)
PMV BLD AUTO: 8.7 FL (ref 6–10)
POTASSIUM SERPL-SCNC: 3.5 MMOL/L (ref 3.4–5.1)
PROT SERPL-MCNC: 5.2 G/DL (ref 6.4–8.3)
RBC # BLD AUTO: 3.16 M/UL (ref 3.8–5.8)
SODIUM SERPL-SCNC: 142 MMOL/L (ref 136–145)
TSH SERPL DL<=0.005 MIU/L-ACNC: 0.38 UIU/ML (ref 0.27–4.2)
WBC # BLD AUTO: 7.2 K/UL (ref 4–11)

## 2023-05-16 PROCEDURE — 2580000003 HC RX 258: Performed by: PHYSICIAN ASSISTANT

## 2023-05-16 PROCEDURE — 80053 COMPREHEN METABOLIC PANEL: CPT

## 2023-05-16 PROCEDURE — 84443 ASSAY THYROID STIM HORMONE: CPT

## 2023-05-16 PROCEDURE — 6360000002 HC RX W HCPCS: Performed by: INTERNAL MEDICINE

## 2023-05-16 PROCEDURE — 1200000000 HC SEMI PRIVATE

## 2023-05-16 PROCEDURE — 2500000003 HC RX 250 WO HCPCS: Performed by: PHYSICIAN ASSISTANT

## 2023-05-16 PROCEDURE — 36415 COLL VENOUS BLD VENIPUNCTURE: CPT

## 2023-05-16 PROCEDURE — 87506 IADNA-DNA/RNA PROBE TQ 6-11: CPT

## 2023-05-16 PROCEDURE — G0378 HOSPITAL OBSERVATION PER HR: HCPCS

## 2023-05-16 PROCEDURE — 87449 NOS EACH ORGANISM AG IA: CPT

## 2023-05-16 PROCEDURE — 6370000000 HC RX 637 (ALT 250 FOR IP): Performed by: PHYSICIAN ASSISTANT

## 2023-05-16 PROCEDURE — 99232 SBSQ HOSP IP/OBS MODERATE 35: CPT | Performed by: INTERNAL MEDICINE

## 2023-05-16 PROCEDURE — 87324 CLOSTRIDIUM AG IA: CPT

## 2023-05-16 PROCEDURE — 83735 ASSAY OF MAGNESIUM: CPT

## 2023-05-16 PROCEDURE — 6370000000 HC RX 637 (ALT 250 FOR IP): Performed by: INTERNAL MEDICINE

## 2023-05-16 PROCEDURE — 85027 COMPLETE CBC AUTOMATED: CPT

## 2023-05-16 PROCEDURE — 6360000002 HC RX W HCPCS: Performed by: PHYSICIAN ASSISTANT

## 2023-05-16 RX ORDER — VITAMIN B COMPLEX
1 CAPSULE ORAL DAILY
COMMUNITY

## 2023-05-16 RX ORDER — ANTIOX #8/OM3/DHA/EPA/LUT/ZEAX 250-2.5 MG
CAPSULE ORAL
COMMUNITY

## 2023-05-16 RX ORDER — MULTIVIT WITH MINERALS/LUTEIN
1000 TABLET ORAL DAILY
Status: ON HOLD | COMMUNITY
End: 2023-05-16

## 2023-05-16 RX ORDER — MAGNESIUM SULFATE IN WATER 40 MG/ML
2000 INJECTION, SOLUTION INTRAVENOUS ONCE
Status: COMPLETED | OUTPATIENT
Start: 2023-05-16 | End: 2023-05-16

## 2023-05-16 RX ORDER — MULTIVIT-MIN/FA/LYCOPEN/LUTEIN .4-300-25
1 TABLET ORAL DAILY
COMMUNITY

## 2023-05-16 RX ADMIN — GABAPENTIN 300 MG: 300 CAPSULE ORAL at 21:26

## 2023-05-16 RX ADMIN — INSULIN LISPRO 1 UNITS: 100 INJECTION, SOLUTION INTRAVENOUS; SUBCUTANEOUS at 12:17

## 2023-05-16 RX ADMIN — HYDRALAZINE HYDROCHLORIDE 25 MG: 25 TABLET, FILM COATED ORAL at 09:04

## 2023-05-16 RX ADMIN — LEVOTHYROXINE SODIUM 75 MCG: 0.07 TABLET ORAL at 06:30

## 2023-05-16 RX ADMIN — ALLOPURINOL 100 MG: 100 TABLET ORAL at 09:04

## 2023-05-16 RX ADMIN — ENOXAPARIN SODIUM 40 MG: 100 INJECTION SUBCUTANEOUS at 09:04

## 2023-05-16 RX ADMIN — SODIUM CHLORIDE: 9 INJECTION, SOLUTION INTRAVENOUS at 03:14

## 2023-05-16 RX ADMIN — HYDRALAZINE HYDROCHLORIDE 25 MG: 25 TABLET, FILM COATED ORAL at 18:16

## 2023-05-16 RX ADMIN — GABAPENTIN 300 MG: 300 CAPSULE ORAL at 09:04

## 2023-05-16 RX ADMIN — PRAVASTATIN SODIUM 20 MG: 20 TABLET ORAL at 09:04

## 2023-05-16 RX ADMIN — AMITRIPTYLINE HYDROCHLORIDE 50 MG: 25 TABLET, FILM COATED ORAL at 21:26

## 2023-05-16 RX ADMIN — METRONIDAZOLE 500 MG: 500 INJECTION, SOLUTION INTRAVENOUS at 14:04

## 2023-05-16 RX ADMIN — GABAPENTIN 300 MG: 300 CAPSULE ORAL at 14:02

## 2023-05-16 RX ADMIN — MAGNESIUM SULFATE HEPTAHYDRATE 2000 MG: 40 INJECTION, SOLUTION INTRAVENOUS at 09:56

## 2023-05-16 RX ADMIN — DOXAZOSIN 8 MG: 4 TABLET ORAL at 21:26

## 2023-05-16 RX ADMIN — METRONIDAZOLE 500 MG: 500 INJECTION, SOLUTION INTRAVENOUS at 06:31

## 2023-05-16 RX ADMIN — POTASSIUM BICARBONATE 40 MEQ: 782 TABLET, EFFERVESCENT ORAL at 09:52

## 2023-05-16 RX ADMIN — METRONIDAZOLE 500 MG: 500 INJECTION, SOLUTION INTRAVENOUS at 21:28

## 2023-05-16 NOTE — PLAN OF CARE
Problem: Discharge Planning  Goal: Discharge to home or other facility with appropriate resources  5/16/2023 0244 by Jamaica Elizondo RN  Outcome: Progressing  5/15/2023 1758 by Irene Dye RN  Outcome: Progressing     Problem: Skin/Tissue Integrity  Goal: Absence of new skin breakdown  Description: 1. Monitor for areas of redness and/or skin breakdown  2. Assess vascular access sites hourly  3. Every 4-6 hours minimum:  Change oxygen saturation probe site  4. Every 4-6 hours:  If on nasal continuous positive airway pressure, respiratory therapy assess nares and determine need for appliance change or resting period.   5/15/2023 1758 by Irene Dye RN  Outcome: Progressing     Problem: Safety - Adult  Goal: Free from fall injury  5/16/2023 0244 by Jamaica Elizondo RN  Outcome: Progressing  5/15/2023 1758 by Irene Dye RN  Outcome: Progressing     Problem: Chronic Conditions and Co-morbidities  Goal: Patient's chronic conditions and co-morbidity symptoms are monitored and maintained or improved  5/15/2023 1758 by Irene Dye RN  Outcome: Progressing

## 2023-05-16 NOTE — PLAN OF CARE
Problem: Discharge Planning  Goal: Discharge to home or other facility with appropriate resources  5/16/2023 1633 by Diana Gilliam RN  Outcome: Progressing  5/16/2023 0244 by Bry Slater RN  Outcome: Progressing     Problem: Skin/Tissue Integrity  Goal: Absence of new skin breakdown  Description: 1. Monitor for areas of redness and/or skin breakdown  2. Assess vascular access sites hourly  3. Every 4-6 hours minimum:  Change oxygen saturation probe site  4. Every 4-6 hours:  If on nasal continuous positive airway pressure, respiratory therapy assess nares and determine need for appliance change or resting period.   Outcome: Progressing     Problem: Safety - Adult  Goal: Free from fall injury  5/16/2023 1633 by Diana Gilliam RN  Outcome: Progressing  5/16/2023 0244 by Bry Slater RN  Outcome: Progressing     Problem: Chronic Conditions and Co-morbidities  Goal: Patient's chronic conditions and co-morbidity symptoms are monitored and maintained or improved  Outcome: Progressing Propranolol Pregnancy And Lactation Text: This medication is Pregnancy Category C and it isn't known if it is safe during pregnancy. It is excreted in breast milk.

## 2023-05-16 NOTE — ACP (ADVANCE CARE PLANNING)
Advance Care Planning     General Advance Care Planning (ACP) Conversation    Date of Conversation: 5/14/2023  Conducted with: Patient with Decision Making Capacity per staff on admit    Healthcare Decision Maker:    Primary Decision Maker: Luiza Zaidi - [phone]    Secondary Decision Maker: brinda marcum - Cipriano - [phone]  Click here to complete Healthcare Decision Makers including selection of the Healthcare Decision Maker Relationship (ie \"Primary\"). Today we documented Decision Maker(s) consistent with Legal Next of Kin hierarchy.     Content/Action Overview:  Has NO ACP documents/care preferences - information provided, considering goals and options  Reviewed DNR/DNI and patient elects Full Code (Attempt Resuscitation)    Length of Voluntary ACP Conversation in minutes:  <16 minutes (Non-Billable)

## 2023-05-17 VITALS
SYSTOLIC BLOOD PRESSURE: 153 MMHG | HEART RATE: 64 BPM | OXYGEN SATURATION: 95 % | RESPIRATION RATE: 17 BRPM | DIASTOLIC BLOOD PRESSURE: 66 MMHG | WEIGHT: 130.2 LBS | HEIGHT: 62 IN | TEMPERATURE: 98 F | BODY MASS INDEX: 23.96 KG/M2

## 2023-05-17 LAB
ALBUMIN SERPL-MCNC: 3.1 G/DL (ref 3.4–4.8)
ALBUMIN/GLOB SERPL: 1.5 {RATIO} (ref 0.8–2)
ALP SERPL-CCNC: 107 U/L (ref 25–100)
ALT SERPL-CCNC: 9 U/L (ref 4–36)
ANION GAP SERPL CALCULATED.3IONS-SCNC: 8 MMOL/L (ref 3–16)
AST SERPL-CCNC: 14 U/L (ref 8–33)
BILIRUB SERPL-MCNC: 0.3 MG/DL (ref 0.3–1.2)
BUN SERPL-MCNC: 7 MG/DL (ref 6–20)
CALCIUM SERPL-MCNC: 8.4 MG/DL (ref 8.5–10.5)
CHLORIDE SERPL-SCNC: 109 MMOL/L (ref 98–107)
CO2 SERPL-SCNC: 23 MMOL/L (ref 20–30)
CREAT SERPL-MCNC: 0.7 MG/DL (ref 0.4–1.2)
ERYTHROCYTE [DISTWIDTH] IN BLOOD BY AUTOMATED COUNT: 14.9 % (ref 11–16)
GFR SERPLBLD CREATININE-BSD FMLA CKD-EPI: >60 ML/MIN/{1.73_M2}
GI PATHOGENS PNL STL NAA+PROBE: NORMAL
GLOBULIN SER CALC-MCNC: 2.1 G/DL
GLUCOSE BLD-MCNC: 112 MG/DL (ref 74–106)
GLUCOSE SERPL-MCNC: 96 MG/DL (ref 74–106)
HCT VFR BLD AUTO: 30.5 % (ref 37–47)
HGB BLD-MCNC: 9.8 G/DL (ref 11.5–16.5)
MCH RBC QN AUTO: 30.2 PG (ref 27–32)
MCHC RBC AUTO-ENTMCNC: 32.1 G/DL (ref 31–35)
MCV RBC AUTO: 93.8 FL (ref 80–100)
PERFORMED ON: ABNORMAL
PLATELET # BLD AUTO: 122 K/UL (ref 150–400)
PMV BLD AUTO: 8.9 FL (ref 6–10)
POTASSIUM SERPL-SCNC: 3.7 MMOL/L (ref 3.4–5.1)
PROT SERPL-MCNC: 5.2 G/DL (ref 6.4–8.3)
RBC # BLD AUTO: 3.25 M/UL (ref 3.8–5.8)
SODIUM SERPL-SCNC: 140 MMOL/L (ref 136–145)
WBC # BLD AUTO: 7.3 K/UL (ref 4–11)

## 2023-05-17 PROCEDURE — 2500000003 HC RX 250 WO HCPCS: Performed by: PHYSICIAN ASSISTANT

## 2023-05-17 PROCEDURE — 80053 COMPREHEN METABOLIC PANEL: CPT

## 2023-05-17 PROCEDURE — 99238 HOSP IP/OBS DSCHRG MGMT 30/<: CPT | Performed by: INTERNAL MEDICINE

## 2023-05-17 PROCEDURE — 6370000000 HC RX 637 (ALT 250 FOR IP): Performed by: INTERNAL MEDICINE

## 2023-05-17 PROCEDURE — 2580000003 HC RX 258: Performed by: PHYSICIAN ASSISTANT

## 2023-05-17 PROCEDURE — 6370000000 HC RX 637 (ALT 250 FOR IP): Performed by: PHYSICIAN ASSISTANT

## 2023-05-17 PROCEDURE — 36415 COLL VENOUS BLD VENIPUNCTURE: CPT

## 2023-05-17 PROCEDURE — 85027 COMPLETE CBC AUTOMATED: CPT

## 2023-05-17 PROCEDURE — 2580000003 HC RX 258: Performed by: INTERNAL MEDICINE

## 2023-05-17 PROCEDURE — 6360000002 HC RX W HCPCS: Performed by: INTERNAL MEDICINE

## 2023-05-17 RX ORDER — POTASSIUM CHLORIDE 750 MG/1
40 CAPSULE, EXTENDED RELEASE ORAL ONCE
Status: COMPLETED | OUTPATIENT
Start: 2023-05-17 | End: 2023-05-17

## 2023-05-17 RX ORDER — LOPERAMIDE HYDROCHLORIDE 2 MG/1
2 CAPSULE ORAL 4 TIMES DAILY PRN
Status: DISCONTINUED | OUTPATIENT
Start: 2023-05-17 | End: 2023-05-17 | Stop reason: HOSPADM

## 2023-05-17 RX ORDER — LOPERAMIDE HYDROCHLORIDE 2 MG/1
2 CAPSULE ORAL 4 TIMES DAILY PRN
Qty: 40 CAPSULE | Refills: 0 | Status: SHIPPED | OUTPATIENT
Start: 2023-05-17 | End: 2023-05-27

## 2023-05-17 RX ORDER — HYDRALAZINE HYDROCHLORIDE 25 MG/1
25 TABLET, FILM COATED ORAL 2 TIMES DAILY
Qty: 90 TABLET | Refills: 3 | Status: SHIPPED | OUTPATIENT
Start: 2023-05-17

## 2023-05-17 RX ADMIN — METRONIDAZOLE 500 MG: 500 INJECTION, SOLUTION INTRAVENOUS at 05:47

## 2023-05-17 RX ADMIN — PRAVASTATIN SODIUM 20 MG: 20 TABLET ORAL at 08:51

## 2023-05-17 RX ADMIN — LEVOTHYROXINE SODIUM 75 MCG: 0.07 TABLET ORAL at 05:45

## 2023-05-17 RX ADMIN — ENOXAPARIN SODIUM 40 MG: 100 INJECTION SUBCUTANEOUS at 08:51

## 2023-05-17 RX ADMIN — GABAPENTIN 300 MG: 300 CAPSULE ORAL at 08:51

## 2023-05-17 RX ADMIN — POTASSIUM CHLORIDE 40 MEQ: 10 CAPSULE, COATED, EXTENDED RELEASE ORAL at 09:53

## 2023-05-17 RX ADMIN — ALLOPURINOL 100 MG: 100 TABLET ORAL at 08:51

## 2023-05-17 RX ADMIN — SODIUM CHLORIDE: 9 INJECTION, SOLUTION INTRAVENOUS at 09:54

## 2023-05-17 RX ADMIN — HYDRALAZINE HYDROCHLORIDE 25 MG: 25 TABLET, FILM COATED ORAL at 08:51

## 2023-05-17 RX ADMIN — LOPERAMIDE HYDROCHLORIDE 2 MG: 2 CAPSULE ORAL at 10:39

## 2023-05-17 RX ADMIN — SODIUM CHLORIDE, PRESERVATIVE FREE 10 ML: 5 INJECTION INTRAVENOUS at 08:52

## 2023-05-17 NOTE — FLOWSHEET NOTE
05/17/23 0851   Assessment   Charting Type Shift assessment   Psychosocial   Psychosocial (WDL) WDL   Neurological   Neuro (WDL) WDL   Level of Consciousness 0   [place] Coma Scale   Eye Opening 4   Best Verbal Response 5   Best Motor Response 6   [place] Coma Scale Score 15   HEENT (Head, Ears, Eyes, Nose, & Throat)   HEENT (WDL) WDL   Right Eye Glasses   Left Eye Glasses   Right Ear Deaf   Voice Difficulty talking   Teeth Dentures upper   Respiratory   Respiratory (WDL) WDL   Breath Sounds   Right Upper Lobe Clear   Right Middle Lobe Clear   Right Lower Lobe Diminished   Left Upper Lobe Clear   Left Lower Lobe Diminished   Cardiac   Cardiac (WDL) WDL   Gastrointestinal   Abdominal (WDL) X   Abdomen Inspection Rounded; Soft   Last BM (including prior to admit) 05/17/23   RUQ Bowel Sounds Active   LUQ Bowel Sounds Active   RLQ Bowel Sounds Active   LLQ Bowel Sounds Active   Genitourinary   Genitourinary (WDL) WDL   Peripheral Vascular   Peripheral Vascular (WDL) WDL   Skin Integumentary    Skin Integumentary (WDL) X   Skin Color Pale   Skin Condition/Temp Dry; Warm   Skin Integrity Redness   Location sacrum   Care Plan - Skin/Tissue Integrity Goals   Skin Integrity Remains Intact Monitor for areas of redness and/or skin breakdown   Musculoskeletal   Musculoskeletal (WDL) X   RL Extremity Weakness   LL Extremity Weakness   Care Plan - Discharge Planning Goals   Discharge to home or other facility with appropriate resources Identify barriers to discharge with patient and caregiver; Identify discharge learning needs (meds, wound care, etc)     Pt is alert and oriented. Pt is resting in bed and appears to have no acute distress. Pt currently on room air. Pt's lung sounds are clear. Pt encouraged to cough and deep breathe. Pt call bell and bedside table within reach.

## 2023-05-17 NOTE — PLAN OF CARE
Problem: Discharge Planning  Goal: Discharge to home or other facility with appropriate resources  Outcome: Progressing  Flowsheets (Taken 5/17/2023 0767)  Discharge to home or other facility with appropriate resources:   Identify barriers to discharge with patient and caregiver   Identify discharge learning needs (meds, wound care, etc)     Problem: Skin/Tissue Integrity  Goal: Absence of new skin breakdown  Description: 1. Monitor for areas of redness and/or skin breakdown  2. Assess vascular access sites hourly  3. Every 4-6 hours minimum:  Change oxygen saturation probe site  4. Every 4-6 hours:  If on nasal continuous positive airway pressure, respiratory therapy assess nares and determine need for appliance change or resting period.   Outcome: Progressing     Problem: Safety - Adult  Goal: Free from fall injury  Outcome: Progressing     Problem: Chronic Conditions and Co-morbidities  Goal: Patient's chronic conditions and co-morbidity symptoms are monitored and maintained or improved  Outcome: Progressing

## 2023-05-17 NOTE — PROGRESS NOTES
CLINICAL PHARMACY NOTE: MEDS TO BEDS    Total # of Prescriptions Filled: 2   The following medications were delivered to the patient:  Discharge Medication List as of 5/17/2023 11:30 AM        START taking these medications    Details   loperamide (IMODIUM) 2 MG capsule Take 1 capsule by mouth 4 times daily as needed for Diarrhea, Disp-40 capsule, R-0Normal      hydrALAZINE (APRESOLINE) 25 MG tablet Take 1 tablet by mouth 2 times daily, Disp-90 tablet, R-3Normal               Additional Documentation: Medications were delivered to patient's room number 4. Patient had no questions.  paid with card.
Complete Med HX completed by med list from [facility]) and by patient interview. Added:  -Vitamin B complex 1000 mcg caps   -Centrum Silver multivitamin   -Preservision vitamins       Removed:  -Cyanocobalamin 1000 mcg/ml (Patient stated she no longer takes injection)     Changed:  N/A      If you have any questions, please call Pharmacy at [place] [phone].
Occupational Therapy  Facility/Department: [facility]  Occupational Therapy Initial Assessment    Name: Beto Hoover  : 1945  MRN: [id number]  Date of Service: 5/15/2023    Discharge Recommendations:   Home with assist Prn. Patient Diagnosis(es): The primary encounter diagnosis was Intractable nausea and vomiting. Diagnoses of Dehydration and Viral gastroenteritis were also pertinent to this visit. Past Medical History:  has a past medical history of Arthritis, Chronic bronchitis ([facility] Diabetes mellitus ([facility] Fibromyalgia, Hyperlipidemia, Hypertension, and Thyroid disease. Past Surgical History:  has a past surgical history that includes Cholecystectomy; eye surgery; knee surgery (Bilateral); Cataract removal (Bilateral); Hysterectomy; and bladder repair. Assessment   Assessment: This 65year old female was referred to OT services upon admission following onset of gastritis. Pt presents in bed on room air and drowsy. Pt awakened and agreeable to mobility. Pt transferred to sitting at EOB with MOD I. Pt AROM and MMT WFL. Pt completed LB dressing with independence. Pt declines need to toilet but states she was independent with task. Pt come to stand and ambulated in hallway x400 feet with no AD. No LOB. Pt tolerated well. Pt returned to room and transferred to supine with independence. Pt appears at baseline functional status. No skilled OT services warranted at this time. Prognosis: Good  Decision Making: Low Complexity  No Skilled OT: At baseline function; Safe to return home; Independent with ADL's  REQUIRES OT FOLLOW-UP: No  Activity Tolerance  Activity Tolerance: Patient Tolerated treatment well        Plan         Restrictions  Restrictions/Precautions  Restrictions/Precautions: Fall Risk, General Precautions, Contact Precautions  Required Braces or Orthoses?: No    Subjective   General  Chart Reviewed: Yes  Patient assessed for rehabilitation services?: Yes  Family / Caregiver
Physical Therapy  Facility/Department: [facility]  Physical Therapy Initial Assessment/Discharge Summary    Name: Mariam Alan  : 1945  MRN: [id number]  Date of Service: 5/15/2023    Discharge Recommendations:  Home with assist PRN          Patient Diagnosis(es): The primary encounter diagnosis was Intractable nausea and vomiting. Diagnoses of Dehydration and Viral gastroenteritis were also pertinent to this visit. Past Medical History:  has a past medical history of Arthritis, Chronic bronchitis ([facility] Diabetes mellitus ([facility] Fibromyalgia, Hyperlipidemia, Hypertension, and Thyroid disease. Past Surgical History:  has a past surgical history that includes Cholecystectomy; eye surgery; knee surgery (Bilateral); Cataract removal (Bilateral); Hysterectomy; and bladder repair. Assessment   Assessment: PT eval completed on this patient admitted to hospital with primary diagnosis of gastritis. She is received supine in bed on RA. NAD. Drowsy but pleasant and cooperative. She is able to perform bed mobility with mod I. Sit to stand, transfers and able to ambulate 350' with SBA. Patient is returned to supine in bed per her request. Patient presents at her baseline functional level and does not require continued skilled PT intervention at this time. Therapy Prognosis: Good  Decision Making: Low Complexity  Requires PT Follow-Up: No  Activity Tolerance  Activity Tolerance: Patient tolerated evaluation without incident;Patient tolerated treatment well     Plan   Physcial Therapy Plan  General Plan: Discharge with evaluation only  Safety Devices  Type of Devices: Call light within reach, Left in bed     Restrictions  Restrictions/Precautions  Restrictions/Precautions: Fall Risk, General Precautions, Contact Precautions  Required Braces or Orthoses?: No     Subjective   Pain: No c/o.   General  Chart Reviewed: Yes  Patient assessed for rehabilitation services?: Yes  Family / Caregiver Present: No  Follows
Pt arrived to floor from  ED via stretch. Chief complaint is N/V for the past 24 hrs. History of mouth cancer. Has upper dentures with upper artifical palate. Patient can not have straws; cups; ice. Drinks from water bottle. Vitals taken and recorded. Head to toe assessment done. Appropriate meds administered. HOB elevated. Aspiration and C-Diff precautions. Call light with in reach. Family at bed side.
Pt is stable and discharged at 1205. IV was removed without complication, tip intact. Pt educated on new medications, next dose time and on pharmacy . Pt educated on follow up appointments. Pt's spouse at bedside. Pt verbalized understanding and had no further questions. Pt taken by wheelchair to parking lot and left via private vehicle with spouse.
with ALISSA Price. Agree with note as above. Assessment and plan was done by me as below. 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 Continue supportive and symptomatic treatment. Continue Flagyl. Follow stool studies. Monitor electrolytes and replace if needed (magnesium). Titrate blood pressure regimen to help get her blood pressure under better control. Monitor renal function and try to avoid a nephrotoxic agent. Monitor hemoglobin level which has been trending down. Monitor platelet count which has been trending down as well.       Electronically signed by Carlos Alberto Leroy MD on 5/16/2023 at 11:56 PM

## 2023-05-17 NOTE — PLAN OF CARE
Problem: Discharge Planning  Goal: Discharge to home or other facility with appropriate resources  5/17/2023 1119 by Malathi Oliver RN  Outcome: Adequate for Discharge  5/17/2023 1033 by Malathi Oliver RN  Outcome: Progressing  Flowsheets (Taken 5/17/2023 0684)  Discharge to home or other facility with appropriate resources:   Identify barriers to discharge with patient and caregiver   Identify discharge learning needs (meds, wound care, etc)     Problem: Skin/Tissue Integrity  Goal: Absence of new skin breakdown  Description: 1. Monitor for areas of redness and/or skin breakdown  2. Assess vascular access sites hourly  3. Every 4-6 hours minimum:  Change oxygen saturation probe site  4. Every 4-6 hours:  If on nasal continuous positive airway pressure, respiratory therapy assess nares and determine need for appliance change or resting period.   5/17/2023 1119 by Malathi Oliver RN  Outcome: Adequate for Discharge  5/17/2023 1033 by Malathi Oliver RN  Outcome: Progressing     Problem: Safety - Adult  Goal: Free from fall injury  5/17/2023 1119 by Malathi Oliver RN  Outcome: Adequate for Discharge  5/17/2023 1033 by Malathi Oliver RN  Outcome: Progressing     Problem: Chronic Conditions and Co-morbidities  Goal: Patient's chronic conditions and co-morbidity symptoms are monitored and maintained or improved  5/17/2023 1119 by Malathi Oliver RN  Outcome: Adequate for Discharge  5/17/2023 1033 by Malathi Oliver RN  Outcome: Progressing

## 2023-05-17 NOTE — DISCHARGE SUMMARY
1 week    Labs: For convenience and continuity at follow-up the following most recent labs are provided:    CBC:   Lab Results   Component Value Date/Time    WBC 7.3 05/17/2023 04:56 AM    HGB 9.8 05/17/2023 04:56 AM    HCT 30.5 05/17/2023 04:56 AM     05/17/2023 04:56 AM       RENAL:   Lab Results   Component Value Date/Time     05/17/2023 04:56 AM    K 3.7 05/17/2023 04:56 AM     05/17/2023 04:56 AM    CO2 23 05/17/2023 04:56 AM    BUN 7 05/17/2023 04:56 AM    CREATININE 0.7 05/17/2023 04:56 AM         Discharge Medications:   Current Discharge Medication List             Details   loperamide (IMODIUM) 2 MG capsule Take 1 capsule by mouth 4 times daily as needed for Diarrhea  Qty: 40 capsule, Refills: 0      hydrALAZINE (APRESOLINE) 25 MG tablet Take 1 tablet by mouth 2 times daily  Qty: 90 tablet, Refills: 3                Details   Multiple Vitamins-Minerals (CENTRUM SILVER ADULT 50+) TABS Take 1 tablet by mouth daily      Multiple Vitamins-Minerals (PRESERVISION AREDS 2) CAPS Take by mouth      b complex vitamins capsule Take 1 capsule by mouth daily      levothyroxine (SYNTHROID) 75 MCG tablet Take 1 tablet by mouth Daily      CONTOUR TEST strip 1 each by Miscell. (Med.Supl.;Non-Drugs) route daily To check glucose      Syringe/Needle, Disp, (SYRINGE 3CC/25GX1\") 25G X 1\" 3 ML MISC Use as directed  Qty: 4 each, Refills: 0      gabapentin (NEURONTIN) 400 MG capsule Take 1 capsule by mouth 3 times daily.       allopurinol (ZYLOPRIM) 100 MG tablet Take 1 tablet by mouth daily      ipratropium-albuterol (DUONEB) 0.5-2.5 (3) MG/3ML SOLN nebulizer solution Inhale 3 mLs into the lungs every 4 hours as needed for Shortness of Breath  Qty: 30 vial, Refills: 0      Ergocalciferol (VITAMIN D2 PO) Take 1.25 mg by mouth every 14 days Indications: take on Saturday      doxazosin (CARDURA) 8 MG tablet Take 1 tablet by mouth nightly      albuterol (PROAIR HFA) 108 (90 BASE) MCG/ACT inhaler Inhale 2 puffs into

## 2023-05-17 NOTE — FLOWSHEET NOTE
05/16/23 5228   Assessment   Charting Type Shift assessment   Psychosocial   Psychosocial (WDL) WDL   Neurological   Neuro (WDL) WDL   [place] Coma Scale   Eye Opening 4   Best Verbal Response 5   Best Motor Response 6   [place] Coma Scale Score 15   HEENT (Head, Ears, Eyes, Nose, & Throat)   HEENT (WDL) WDL   Right Eye Glasses   Left Eye Glasses   Right Ear Deaf   Voice Difficulty talking   Teeth Dentures upper   Respiratory   Respiratory (WDL) WDL   Breath Sounds   Right Upper Lobe Clear   Right Middle Lobe Clear   Right Lower Lobe Diminished   Left Upper Lobe Clear   Left Lower Lobe Diminished   Cardiac   Cardiac (WDL) WDL   Gastrointestinal   Abdominal (WDL) X   Abdomen Inspection Rounded; Soft   RUQ Bowel Sounds Active   LUQ Bowel Sounds Active   RLQ Bowel Sounds Active   LLQ Bowel Sounds Active   Genitourinary   Genitourinary (WDL) WDL   Peripheral Vascular   Peripheral Vascular (WDL) WDL   Skin Integumentary    Skin Integumentary (WDL) X   Skin Color Pale   Skin Condition/Temp Warm;Dry   Skin Integrity Redness   Location sacrum   Musculoskeletal   Musculoskeletal (WDL) X   RL Extremity Weakness   LL Extremity Weakness

## 2023-06-13 ENCOUNTER — OFFICE VISIT (OUTPATIENT)
Dept: ENDOCRINOLOGY | Facility: CLINIC | Age: 78
End: 2023-06-13
Payer: MEDICARE

## 2023-06-13 VITALS
DIASTOLIC BLOOD PRESSURE: 64 MMHG | BODY MASS INDEX: 22.2 KG/M2 | WEIGHT: 130 LBS | SYSTOLIC BLOOD PRESSURE: 120 MMHG | OXYGEN SATURATION: 97 % | HEART RATE: 81 BPM | HEIGHT: 64 IN

## 2023-06-13 DIAGNOSIS — E11.65 TYPE 2 DIABETES MELLITUS WITH HYPERGLYCEMIA, WITHOUT LONG-TERM CURRENT USE OF INSULIN: Primary | Chronic | ICD-10-CM

## 2023-06-13 LAB
EXPIRATION DATE: ABNORMAL
GLUCOSE BLDC GLUCOMTR-MCNC: 253 MG/DL (ref 70–130)
Lab: ABNORMAL

## 2023-06-13 PROCEDURE — 1160F RVW MEDS BY RX/DR IN RCRD: CPT | Performed by: PHYSICIAN ASSISTANT

## 2023-06-13 PROCEDURE — 99213 OFFICE O/P EST LOW 20 MIN: CPT | Performed by: PHYSICIAN ASSISTANT

## 2023-06-13 PROCEDURE — 3078F DIAST BP <80 MM HG: CPT | Performed by: PHYSICIAN ASSISTANT

## 2023-06-13 PROCEDURE — 1159F MED LIST DOCD IN RCRD: CPT | Performed by: PHYSICIAN ASSISTANT

## 2023-06-13 PROCEDURE — 82947 ASSAY GLUCOSE BLOOD QUANT: CPT | Performed by: PHYSICIAN ASSISTANT

## 2023-06-13 PROCEDURE — 3074F SYST BP LT 130 MM HG: CPT | Performed by: PHYSICIAN ASSISTANT

## 2023-06-13 RX ORDER — CARVEDILOL 25 MG/1
TABLET, FILM COATED ORAL
Qty: 200 EACH | Refills: 3 | Status: SHIPPED | OUTPATIENT
Start: 2023-06-13

## 2023-06-13 RX ORDER — NATEGLINIDE 120 MG/1
120 TABLET ORAL
Qty: 270 TABLET | Refills: 3 | Status: SHIPPED | OUTPATIENT
Start: 2023-06-13

## 2023-06-13 RX ORDER — HYDRALAZINE HYDROCHLORIDE 25 MG/1
1 TABLET, FILM COATED ORAL 2 TIMES DAILY
COMMUNITY
Start: 2023-05-17

## 2023-06-13 RX ORDER — BLOOD-GLUCOSE METER
1 EACH MISCELLANEOUS 2 TIMES DAILY
Qty: 1 KIT | Refills: 0 | Status: SHIPPED | OUTPATIENT
Start: 2023-06-13

## 2023-06-13 NOTE — PROGRESS NOTES
Office Note      Date: 2023  Patient Name: Janette Diaz  MRN: [id number]  : 1945    Chief Complaint   Patient presents with    Diabetes       History of Present Illness  Janette Diaz is a 77 y.o. female who presents today for follow up on type 2 diabetes.   Diabetes was diagnosed in ~.  Known diabetic complications: CKD.  Current diabetic medications: Nateglinide.  Past diabetic medications: Metformin, pioglitazone, sulfonylurea, DPP-4 inh, basal insulin.  Never taken SGLT2 inh (history of recurrent UTIs) or GLP-1 RA.  She is testing blood sugars once per day.  Fasting readings typically 110-120 range, lowest has been 78.  She denies any hypoglycemia.  Feet: No sores or new issues.  Foot exam today.  Last eye exam: .  ACE inhibitor/ARB: No.  Statin: Pravastatin.  CKD - followed by nephrology.  She was admitted to [facility] with pneumonia in 2023.  She was more recently admitted to [facility] in [place], [place] in May 2023 with intractable nausea, vomiting, and diarrhea.  Diagnosed with viral gastroenteritis.  She reports that she feels okay overall, but energy level is lower than it used to be    Review of Systems   Constitutional: Negative.  Positive for fatigue.   Cardiovascular: Negative.    Gastrointestinal: Negative.    Endocrine: Negative.      Past Medical History:   Diagnosis Date    Benign essential hypertension     Bladder prolapse, female, acquired     Cancer of oral cavity     s/p surgery, radiation    Chronic kidney disease     Dysuria     Fibromyalgia     Hyperlipidemia     Hypertension     Hyperthyroidism     developed hypothyroidism following I-131    Macular degeneration     Osteoarthritis     PONV (postoperative nausea and vomiting)     Postablative hypothyroidism     s/p I-131 x3    Type II diabetes mellitus, uncontrolled     Vaginal delivery 1965 -  9 lbs. 6 oz.; 1970 -7 lbs. 3 oz. 2018    Varicose veins     Wears glasses       Past Surgical  "History:   Procedure Laterality Date    SUPRACERVICAL HYSTERECTOMY SACROCOLPOPEXY N/A 7/2/2018    Procedure: LAPAROSCOPIC SUPRACERVICAL HYSTERECTOMY SACROCOLPOPEXY WITH DAVINCI ROBOT;  Surgeon: Mihai Best MD;  Location: [facility] OR;  Service: DaVinci    ANTERIOR AND POSTERIOR VAGINAL REPAIR N/A 7/2/2018    Procedure: ANTERIOR AND POSTERIOR VAGINAL REPAIR;  Surgeon: Mihai Best MD;  Location:  [facility];  Service: Gynecology    CYSTOSCOPY RETROGRADE PYELOGRAM Bilateral 9/5/2018    Procedure: CYSTOSCOPY RETROGRADE PYELOGRAM;  Surgeon: Alex Harden MD;  Location:  [facility];  Service: Urology    ANTERIOR VAGINAL REPAIR N/A 10/3/2018    Procedure: ANTERIOR VAGINAL REPAIR, TRANSVAGINAL TAPING SUSPENSION;  Surgeon: Mihai Best MD;  Location:  [facility] OR;  Service: Obstetrics/Gynecology    CHOLECYSTECTOMY      COLONOSCOPY      4-5 YEARS AGO    EYE SURGERY      cataract extraction with lens implant    JOINT REPLACEMENT      bilateral knee replacement    TUBAL ABDOMINAL LIGATION       The following portions of the patient's history were reviewed and updated as appropriate: allergies, current medications, past family history, past medical history, past social history, past surgical history, and problem list.    Vitals:  /64   Pulse 81   Ht 162.6 cm (64\")   Wt 59 kg (130 lb)   SpO2 97%   BMI 22.31 kg/m²     Physical Exam  Vitals reviewed.   Constitutional:       General: She is not in acute distress.  Cardiovascular:      Pulses:           Dorsalis pedis pulses are 2+ on the right side and 2+ on the left side.        Posterior tibial pulses are 2+ on the right side and 2+ on the left side.   Musculoskeletal:      Right foot: No deformity.      Left foot: Bunion present.   Feet:      Right foot:      Protective Sensation: 8 sites tested.  8 sites sensed.      Skin integrity: No ulcer or skin breakdown.      Toenail Condition: Right toenails are normal.      Left foot:      Protective Sensation: 8 sites tested.  8 " sites sensed.      Skin integrity: Callus (mild) present. No ulcer or skin breakdown.      Toenail Condition: Left toenails are normal.      Comments: Diabetic Foot Exam Performed and Monofilament Test Performed  Neurological:      Mental Status: She is alert and oriented to person, place, and time.   Psychiatric:         Mood and Affect: Affect normal.       Labs/Imaging    Hemoglobin A1c  Lab Results   Component Value Date    HGBA1C 6.9 (H) 05/15/2023    HGBA1C 7.2 (H) 04/10/2023    HGBA1C 7.2 (H) 10/08/2022     Glucose  Lab Results   Component Value Date    POCGLU 253 (A) 06/13/2023   Repeat fingerstick       Current Outpatient Medications   Medication Instructions    allopurinol (ZYLOPRIM) 100 MG tablet 1 tablet, Oral, Daily    amitriptyline (ELAVIL) 50 MG tablet Take 50 mg by mouth nightly.    B Complex Vitamins (VITAMIN B COMPLEX PO) Oral, Daily    Blood Glucose Monitoring Suppl (Contour Next Monitor) w/Device kit 1 each, Does not apply, 2 Times Daily, Dx: E11.65    Contour Test test strip USE 1 STRIP TO CHECK GLUCOSE TWICE DAILY    doxazosin (CARDURA) 8 MG tablet Daily    gabapentin (NEURONTIN) 400 MG capsule 3 Times Daily    hydrALAZINE (APRESOLINE) 25 MG tablet 1 tablet, Oral, 2 Times Daily    HYDROcodone-acetaminophen (NORCO) 5-325 MG per tablet hydrocodone 5 mg-acetaminophen 325 mg tablet   TAKE 1 TABLET BY MOUTH 4 TIMES DAILY AS NEEDED    levothyroxine (SYNTHROID, LEVOTHROID) 75 MCG tablet Daily    Multiple Vitamins-Minerals (PRESERVISION AREDS 2+MULTI VIT PO) PreserVision AREDS    nateglinide (STARLIX) 120 mg, Oral, 3 Times Daily Before Meals    Nutritional Supplements (GLUCERNA 1.5 MUSHTAQ PO) 2 times daily    Omeprazole 20 MG tablet delayed-release As Needed    pravastatin (PRAVACHOL) 20 MG tablet Daily    vitamin D (ERGOCALCIFEROL) 50,000 Units, Oral, Every 14 Days       Assessment & Plan  Diagnoses and all orders for this visit:    1. Type 2 diabetes mellitus with hyperglycemia, without long-term  current use of insulin (Primary)  Assessment & Plan:  Diabetes is unchanged.  A1c okay 6.9% last month.  This could be falsely lowered somewhat due to anemia.  Fasting blood sugars look good.  No known hypoglycemia.  Fingerstick BG was high in the office today ~3h after breakfast.  BG was 253, improved to 217.  She will start testing some blood sugars 2h after meals and call if often over 200.  Encouraged nutritious dietary choices, moderation of carbohydrates, regular physical activity.  Continue nateglinide with meals.  Diabetes will be reassessed in 6 months, sooner PRN.    Orders:  -     POC Glucose, Blood  -     Cancel: POC Glycosylated Hemoglobin (Hb A1C)  -     Contour Test test strip; USE 1 STRIP TO CHECK GLUCOSE TWICE DAILY  Dispense: 200 each; Refill: 3  -     Blood Glucose Monitoring Suppl (Contour Next Monitor) w/Device kit; 1 each 2 (Two) Times a Day. Dx: E11.65  Dispense: 1 kit; Refill: 0  -     nateglinide (STARLIX) 120 MG tablet; Take 1 tablet by mouth 3 (Three) Times a Day Before Meals.  Dispense: 270 tablet; Refill: 3      Return in about 6 months (around 12/13/2023) for next scheduled follow up. She was advised to contact the office with any interval questions or concerns.    TMAARA Tejada  Endocrinology  06/13/2023

## 2023-06-13 NOTE — ASSESSMENT & PLAN NOTE
Diabetes is unchanged.  A1c okay 6.9% last month.  This could be falsely lowered somewhat due to anemia.  Fasting blood sugars look good.  No known hypoglycemia.  Fingerstick BG was high in the office today ~3h after breakfast.  BG was 253, improved to 217.  She will start testing some blood sugars 2h after meals and call if often over 200.  Encouraged nutritious dietary choices, moderation of carbohydrates, regular physical activity.  Continue nateglinide with meals.  Diabetes will be reassessed in 6 months, sooner PRN.

## 2023-10-09 ENCOUNTER — HOSPITAL ENCOUNTER (OUTPATIENT)
Facility: HOSPITAL | Age: 78
Discharge: HOME | End: 2023-10-09
Payer: MEDICARE

## 2023-10-09 LAB
25(OH)D3 SERPL-MCNC: 40.6 NG/ML (ref 32–100)
ALBUMIN SERPL-MCNC: 4.2 G/DL (ref 3.4–4.8)
ALBUMIN/GLOB SERPL: 1.6 {RATIO} (ref 0.8–2)
ALP SERPL-CCNC: 191 U/L (ref 25–100)
ALT SERPL-CCNC: 18 U/L (ref 4–36)
AMORPH SED URNS QL MICRO: ABNORMAL /HPF
ANION GAP SERPL CALCULATED.3IONS-SCNC: 9 MMOL/L (ref 3–16)
AST SERPL-CCNC: 21 U/L (ref 8–33)
BILIRUB SERPL-MCNC: <0.2 MG/DL (ref 0.3–1.2)
BILIRUB UR QL STRIP.AUTO: NEGATIVE
BUN SERPL-MCNC: 23 MG/DL (ref 6–20)
CALCIUM SERPL-MCNC: 9.8 MG/DL (ref 8.5–10.5)
CHLORIDE SERPL-SCNC: 102 MMOL/L (ref 98–107)
CLARITY UR: CLEAR
CO2 SERPL-SCNC: 26 MMOL/L (ref 20–30)
COLOR UR: YELLOW
CREAT SERPL-MCNC: 1.1 MG/DL (ref 0.4–1.2)
CREAT UR-MCNC: 84.5 MG/DL (ref 28–259)
EPI CELLS #/AREA URNS HPF: ABNORMAL /HPF (ref 0–5)
ERYTHROCYTE [DISTWIDTH] IN BLOOD BY AUTOMATED COUNT: 14.1 % (ref 11–16)
GFR SERPLBLD CREATININE-BSD FMLA CKD-EPI: 51 ML/MIN/{1.73_M2}
GLOBULIN SER CALC-MCNC: 2.7 G/DL
GLUCOSE SERPL-MCNC: 144 MG/DL (ref 74–106)
GLUCOSE UR STRIP.AUTO-MCNC: NEGATIVE MG/DL
HBA1C MFR BLD: 7.6 %
HCT VFR BLD AUTO: 36.9 % (ref 37–47)
HGB BLD-MCNC: 11.6 G/DL (ref 11.5–16.5)
HGB UR QL STRIP.AUTO: NEGATIVE
KETONES UR STRIP.AUTO-MCNC: NEGATIVE MG/DL
LEUKOCYTE ESTERASE UR QL STRIP.AUTO: ABNORMAL
MCH RBC QN AUTO: 29.8 PG (ref 27–32)
MCHC RBC AUTO-ENTMCNC: 31.4 G/DL (ref 31–35)
MCV RBC AUTO: 94.9 FL (ref 80–100)
MUCOUS THREADS URNS QL MICRO: ABNORMAL /LPF
NITRITE UR QL STRIP.AUTO: NEGATIVE
PH UR STRIP.AUTO: 5.5 [PH] (ref 5–8)
PLATELET # BLD AUTO: 177 K/UL (ref 150–400)
PMV BLD AUTO: 9.3 FL (ref 6–10)
POTASSIUM SERPL-SCNC: 5 MMOL/L (ref 3.4–5.1)
PROT SERPL-MCNC: 6.9 G/DL (ref 6.4–8.3)
PROT UR STRIP.AUTO-MCNC: NEGATIVE MG/DL
PROT UR-MCNC: 9 MG/DL
PROT/CREAT UR-RTO: 0.1 MG/DL
PTH-INTACT SERPL-MCNC: 27.2 PG/ML (ref 14–72)
RBC # BLD AUTO: 3.89 M/UL (ref 3.8–5.8)
RBC #/AREA URNS HPF: ABNORMAL /HPF (ref 0–4)
SODIUM SERPL-SCNC: 137 MMOL/L (ref 136–145)
SP GR UR STRIP.AUTO: 1.01 (ref 1–1.03)
UA DIPSTICK W REFLEX MICRO PNL UR: YES
URATE SERPL-MCNC: 5.1 MG/DL (ref 2.5–7.1)
URN SPEC COLLECT METH UR: ABNORMAL
UROBILINOGEN UR STRIP-ACNC: 0.2 E.U./DL
WBC # BLD AUTO: 9.9 K/UL (ref 4–11)
WBC #/AREA URNS HPF: ABNORMAL /HPF (ref 0–5)

## 2023-10-09 PROCEDURE — 36415 COLL VENOUS BLD VENIPUNCTURE: CPT

## 2023-10-09 PROCEDURE — 82306 VITAMIN D 25 HYDROXY: CPT

## 2023-10-09 PROCEDURE — 83036 HEMOGLOBIN GLYCOSYLATED A1C: CPT

## 2023-10-09 PROCEDURE — 81001 URINALYSIS AUTO W/SCOPE: CPT

## 2023-10-09 PROCEDURE — 85027 COMPLETE CBC AUTOMATED: CPT

## 2023-10-09 PROCEDURE — 80053 COMPREHEN METABOLIC PANEL: CPT

## 2023-10-09 PROCEDURE — 84156 ASSAY OF PROTEIN URINE: CPT

## 2023-10-09 PROCEDURE — 84550 ASSAY OF BLOOD/URIC ACID: CPT

## 2023-10-09 PROCEDURE — 83970 ASSAY OF PARATHORMONE: CPT

## 2023-10-09 PROCEDURE — 82570 ASSAY OF URINE CREATININE: CPT

## 2024-03-05 ENCOUNTER — OFFICE VISIT (OUTPATIENT)
Dept: ENDOCRINOLOGY | Facility: CLINIC | Age: 79
End: 2024-03-05
Payer: MEDICARE

## 2024-03-05 VITALS
HEART RATE: 74 BPM | SYSTOLIC BLOOD PRESSURE: 118 MMHG | DIASTOLIC BLOOD PRESSURE: 62 MMHG | BODY MASS INDEX: 22.36 KG/M2 | WEIGHT: 131 LBS | OXYGEN SATURATION: 96 % | HEIGHT: 64 IN

## 2024-03-05 DIAGNOSIS — E11.65 TYPE 2 DIABETES MELLITUS WITH HYPERGLYCEMIA, WITHOUT LONG-TERM CURRENT USE OF INSULIN: Primary | ICD-10-CM

## 2024-03-05 LAB
EXPIRATION DATE: ABNORMAL
EXPIRATION DATE: ABNORMAL
GLUCOSE BLDC GLUCOMTR-MCNC: 181 MG/DL (ref 70–130)
HBA1C MFR BLD: 7.5 % (ref 4.5–5.7)
Lab: ABNORMAL
Lab: ABNORMAL

## 2024-03-05 RX ORDER — AMLODIPINE BESYLATE 5 MG/1
5 TABLET ORAL DAILY
COMMUNITY

## 2024-03-05 RX ORDER — ASPIRIN 81 MG/1
81 TABLET ORAL DAILY
COMMUNITY

## 2024-03-05 RX ORDER — BLOOD-GLUCOSE METER
1 EACH MISCELLANEOUS 2 TIMES DAILY
Qty: 1 KIT | Refills: 0 | Status: SHIPPED | OUTPATIENT
Start: 2024-03-05

## 2024-03-05 RX ORDER — CARVEDILOL 25 MG/1
TABLET, FILM COATED ORAL
Qty: 200 EACH | Refills: 3 | Status: SHIPPED | OUTPATIENT
Start: 2024-03-05

## 2024-03-05 RX ORDER — NATEGLINIDE 120 MG/1
120 TABLET ORAL
Qty: 270 TABLET | Refills: 3 | Status: SHIPPED | OUTPATIENT
Start: 2024-03-05

## 2024-03-05 NOTE — PROGRESS NOTES
Office Note      Date: 2024  Patient Name: Janette Diaz  MRN: [id number]  : 1945    Chief Complaint   Patient presents with    Diabetes       History of Present Illness:   Janette Diaz is a 78 y.o. female who presents for follow-up for type 2 diabetes diagnosed in .  It has been about 9 months since her last appointment.  She was previously followed by Jonelle Worthington.  She remains on that nateglinide 120 mg 3 times a day.  She reports she is taking this regularly.  She reports she is not surprised her hemoglobin A1c is up some.  She has had a rash on her face and head and has had to be on steroids a few times for this.  She recently saw dermatology and they did a biopsy and she is awaiting the results.  They were concerned this may be related to her oral cancer that was treated 5 years ago but she saw the oncologist and they do not feel this is a concern.  She denies any trouble with hypoglycemia.  She had a history of intolerance to other diabetes medications and SGLT2 inhibitors have not been an option due to recurrent urinary tract infections.  She is due for her eye exam and will get this scheduled.  They are establishing a new eye doctor.  She denies any trouble with her feet today.  Saranya did her foot exam at her appointment in .  She has fasting labs coming up with her primary care physician and will have a copy sent here for review.  She sees nephrology.    Subjective     Review of Systems:   Review of Systems   Constitutional: Negative.    Cardiovascular: Negative.    Gastrointestinal: Negative.    Endocrine: Negative.    Skin:  Positive for rash.   Neurological: Negative.        The following portions of the patient's history were reviewed and updated as appropriate: allergies, current medications, past family history, past medical history, past social history, past surgical history, and problem list.    Objective     Vitals:    24 1326   BP: 118/62   Pulse: 74   SpO2:  "96%   Weight: 59.4 kg (131 lb)   Height: 162.6 cm (64\")     Body mass index is 22.49 kg/m².    Physical Exam    HEMOGLOBIN A1C  Lab Results   Component Value Date    HGBA1C 7.5 (A) 03/05/2024       GLUCOSE  Glucose   Date Value Ref Range Status   03/05/2024 181 (A) 70 - 130 mg/dL Final   09/06/2023 118 (H) 74 - 99 mg/dL Final     Comment:     Accuracy of a glucose result obtained from a capillary whole blood specimen relies upon adequate, non-compromised capillary blood flow.  If the capillary glucose result is not consistent with the patient's clinical signs and symptoms, glucose testing should be repeated with either an arterial or venous sample on the glucometer or sent to the main labortory for testing.             Assessment / Plan      Assessment & Plan:  1. Type 2 diabetes mellitus with hyperglycemia, without long-term current use of insulin  Her hemoglobin A1c is up some as compared to last June when this was checked at 7.5%.  For now we will continue nateglinide 120 mg 3 times a day.  I refilled this prescription today.  I also sent a new prescription for a meter and test strips.  Her weight is stable.  I encouraged healthy eating habits and physical activity as tolerated.  I encouraged her to get her eye exam scheduled.  Her blood pressure is okay today.  - POC Glucose, Blood  - POC Glycosylated Hemoglobin (Hb A1C)  - Contour Test test strip; USE 1 STRIP TO CHECK GLUCOSE TWICE DAILY. Dx E11.65  Dispense: 200 each; Refill: 3  - nateglinide (STARLIX) 120 MG tablet; Take 1 tablet by mouth 3 (Three) Times a Day Before Meals.  Dispense: 270 tablet; Refill: 3  - Blood Glucose Monitoring Suppl (Contour Next Monitor) w/Device kit; Use 1 each 2 (Two) Times a Day. Dx: E11.65  Dispense: 1 kit; Refill: 0      Return in about 6 months (around 9/5/2024) for Recheck.     This note was dictated using Dragon voice recognition.    Electronically signed by: TAMARA Pike  03/05/2024  "

## 2024-03-14 DIAGNOSIS — E11.65 TYPE 2 DIABETES MELLITUS WITH HYPERGLYCEMIA, WITHOUT LONG-TERM CURRENT USE OF INSULIN: ICD-10-CM

## 2024-03-15 ENCOUNTER — TELEPHONE (OUTPATIENT)
Dept: ENDOCRINOLOGY | Facility: CLINIC | Age: 79
End: 2024-03-15
Payer: MEDICARE

## 2024-03-15 RX ORDER — LANCETS 33 GAUGE
1 EACH MISCELLANEOUS 2 TIMES DAILY
Qty: 200 EACH | Refills: 3 | Status: SHIPPED | OUTPATIENT
Start: 2024-03-15

## 2024-06-28 ENCOUNTER — HOSPITAL ENCOUNTER (OUTPATIENT)
Facility: HOSPITAL | Age: 79
Discharge: HOME | End: 2024-06-28
Payer: MEDICARE

## 2024-06-28 LAB
25(OH)D3 SERPL-MCNC: 44.6 NG/ML (ref 32–100)
ALBUMIN SERPL-MCNC: 4 G/DL (ref 3.4–4.8)
ALBUMIN/GLOB SERPL: 1.4 {RATIO} (ref 0.8–2)
ALP SERPL-CCNC: 217 U/L (ref 25–100)
ALT SERPL-CCNC: 23 U/L (ref 4–36)
ANION GAP SERPL CALCULATED.3IONS-SCNC: 11 MMOL/L (ref 3–16)
AST SERPL-CCNC: 23 U/L (ref 8–33)
BILIRUB SERPL-MCNC: 0.3 MG/DL (ref 0.3–1.2)
BILIRUB UR QL STRIP.AUTO: NEGATIVE
BUN SERPL-MCNC: 17 MG/DL (ref 6–20)
CALCIUM SERPL-MCNC: 9.6 MG/DL (ref 8.5–10.5)
CHLORIDE SERPL-SCNC: 104 MMOL/L (ref 98–107)
CLARITY UR: CLEAR
CO2 SERPL-SCNC: 26 MMOL/L (ref 20–30)
COLOR UR: YELLOW
CREAT SERPL-MCNC: 1 MG/DL (ref 0.4–1.2)
CREAT UR-MCNC: 38.5 MG/DL (ref 28–259)
EPI CELLS #/AREA URNS HPF: NORMAL /HPF (ref 0–5)
ERYTHROCYTE [DISTWIDTH] IN BLOOD BY AUTOMATED COUNT: 13.2 % (ref 11–16)
GFR SERPLBLD CREATININE-BSD FMLA CKD-EPI: 57 ML/MIN/{1.73_M2}
GLOBULIN SER CALC-MCNC: 2.8 G/DL
GLUCOSE SERPL-MCNC: 141 MG/DL (ref 74–106)
GLUCOSE UR STRIP.AUTO-MCNC: NEGATIVE MG/DL
HBA1C MFR BLD: 7 %
HCT VFR BLD AUTO: 36.9 % (ref 37–47)
HGB BLD-MCNC: 12.2 G/DL (ref 11.5–16.5)
HGB UR QL STRIP.AUTO: NEGATIVE
KETONES UR STRIP.AUTO-MCNC: NEGATIVE MG/DL
LEUKOCYTE ESTERASE UR QL STRIP.AUTO: NEGATIVE
MCH RBC QN AUTO: 31.5 PG (ref 27–32)
MCHC RBC AUTO-ENTMCNC: 33.1 G/DL (ref 31–35)
MCV RBC AUTO: 95.3 FL (ref 80–100)
NITRITE UR QL STRIP.AUTO: NEGATIVE
PH UR STRIP.AUTO: 6 [PH] (ref 5–8)
PLATELET # BLD AUTO: 148 K/UL (ref 150–400)
PMV BLD AUTO: 9.2 FL (ref 6–10)
POTASSIUM SERPL-SCNC: 5.2 MMOL/L (ref 3.4–5.1)
PROT SERPL-MCNC: 6.8 G/DL (ref 6.4–8.3)
PROT UR STRIP.AUTO-MCNC: NEGATIVE MG/DL
PROT UR-MCNC: 6 MG/DL
PROT/CREAT UR-RTO: 0.2 MG/DL
RBC # BLD AUTO: 3.87 M/UL (ref 3.8–5.8)
RBC #/AREA URNS HPF: NORMAL /HPF (ref 0–4)
SODIUM SERPL-SCNC: 141 MMOL/L (ref 136–145)
SP GR UR STRIP.AUTO: 1.01 (ref 1–1.03)
UA DIPSTICK W REFLEX MICRO PNL UR: NORMAL
URATE SERPL-MCNC: 4.2 MG/DL (ref 2.5–7.1)
URN SPEC COLLECT METH UR: NORMAL
UROBILINOGEN UR STRIP-ACNC: 0.2 E.U./DL
WBC # BLD AUTO: 7.8 K/UL (ref 4–11)
WBC #/AREA URNS HPF: NORMAL /HPF (ref 0–5)

## 2024-06-28 PROCEDURE — 83970 ASSAY OF PARATHORMONE: CPT

## 2024-06-28 PROCEDURE — 85027 COMPLETE CBC AUTOMATED: CPT

## 2024-06-28 PROCEDURE — 82306 VITAMIN D 25 HYDROXY: CPT

## 2024-06-28 PROCEDURE — 83036 HEMOGLOBIN GLYCOSYLATED A1C: CPT

## 2024-06-28 PROCEDURE — 82570 ASSAY OF URINE CREATININE: CPT

## 2024-06-28 PROCEDURE — 81001 URINALYSIS AUTO W/SCOPE: CPT

## 2024-06-28 PROCEDURE — 84156 ASSAY OF PROTEIN URINE: CPT

## 2024-06-28 PROCEDURE — 84550 ASSAY OF BLOOD/URIC ACID: CPT

## 2024-06-28 PROCEDURE — 80053 COMPREHEN METABOLIC PANEL: CPT

## 2024-06-28 PROCEDURE — 36415 COLL VENOUS BLD VENIPUNCTURE: CPT

## 2024-06-29 LAB — PTH-INTACT SERPL-MCNC: 31.3 PG/ML (ref 14–72)

## 2024-09-05 ENCOUNTER — OFFICE VISIT (OUTPATIENT)
Dept: ENDOCRINOLOGY | Facility: CLINIC | Age: 79
End: 2024-09-05
Payer: MEDICARE

## 2024-09-05 VITALS
OXYGEN SATURATION: 98 % | WEIGHT: 126 LBS | SYSTOLIC BLOOD PRESSURE: 118 MMHG | BODY MASS INDEX: 21.51 KG/M2 | HEIGHT: 64 IN | DIASTOLIC BLOOD PRESSURE: 64 MMHG | HEART RATE: 77 BPM

## 2024-09-05 DIAGNOSIS — E11.65 TYPE 2 DIABETES MELLITUS WITH HYPERGLYCEMIA, WITHOUT LONG-TERM CURRENT USE OF INSULIN: Primary | ICD-10-CM

## 2024-09-05 LAB
EXPIRATION DATE: ABNORMAL
EXPIRATION DATE: NORMAL
GLUCOSE BLDC GLUCOMTR-MCNC: 128 MG/DL (ref 70–130)
HBA1C MFR BLD: 6.7 % (ref 4.5–5.7)
Lab: ABNORMAL
Lab: NORMAL

## 2024-09-05 RX ORDER — NATEGLINIDE 120 MG/1
120 TABLET ORAL
Qty: 270 TABLET | Refills: 3 | Status: SHIPPED | OUTPATIENT
Start: 2024-09-05

## 2024-09-05 RX ORDER — CARVEDILOL 25 MG/1
TABLET, FILM COATED ORAL
OUTPATIENT
Start: 2024-09-05

## 2024-09-05 RX ORDER — CARVEDILOL 25 MG/1
TABLET, FILM COATED ORAL
Qty: 200 EACH | Refills: 3 | Status: SHIPPED | OUTPATIENT
Start: 2024-09-05

## 2024-09-05 NOTE — PROGRESS NOTES
"     Office Note      Date: 2024  Patient Name: Janette Diaz  MRN: [id number]  : 1945    Chief Complaint   Patient presents with    Diabetes       History of Present Illness:   Janette Diaz is a 78 y.o. female who presents for follow-up for type 2 diabetes diagnosed in .  She remains on nateglinide 120 mg 3 times a day.  She reports overall her blood sugars have been good.  She denies any severe or frequent hypoglycemia her lowest blood glucose has been 80 mg/dL.  She has a history of intolerance to multiple other diabetes medications and we have not used SGLT2 inhibitors due to recurrent urinary tract infections.  She is up-to-date on her eye exam she is being treated regularly for dry eyes.  She continues to see nephrology regularly.      Subjective     Review of Systems:   Review of Systems   Constitutional: Negative.    Cardiovascular: Negative.    Gastrointestinal: Negative.    Endocrine: Negative.    Neurological: Negative.        The following portions of the patient's history were reviewed and updated as appropriate: allergies, current medications, past family history, past medical history, past social history, past surgical history, and problem list.    Objective     Vitals:    24 1410   BP: 118/64   Pulse: 77   SpO2: 98%   Weight: 57.2 kg (126 lb)   Height: 162.6 cm (64\")     Body mass index is 21.63 kg/m².    Physical Exam  Vitals reviewed.   Constitutional:       General: She is not in acute distress.     Appearance: Normal appearance.   Neurological:      Mental Status: She is alert.         HEMOGLOBIN A1C  Lab Results   Component Value Date    HGBA1C 6.7 (A) 2024       GLUCOSE  Glucose   Date Value Ref Range Status   2024 128 70 - 130 mg/dL Final   2023 118 (H) 74 - 99 mg/dL Final     Comment:     Accuracy of a glucose result obtained from a capillary whole blood specimen relies upon adequate, non-compromised capillary blood flow.  If the capillary " glucose result is not consistent with the patient's clinical signs and symptoms, glucose testing should be repeated with either an arterial or venous sample on the glucometer or sent to the main labortory for testing.             Assessment / Plan      Assessment & Plan:  1. Type 2 diabetes mellitus with hyperglycemia, without long-term current use of insulin  Her hemoglobin A1c today is excellent at 6.7%.  We will continue her current medications.  I refilled her Starlix today as well as her test strips  Her weight is down 5 pounds since her appointment in March.  I encouraged continued healthy eating habits and physical activity as tolerated.  Her blood pressure is excellent today.  We will plan to check back in in 6 months unless anything changes in the interim.  - POC Glucose, Blood  - POC Glycosylated Hemoglobin (Hb A1C)  - Contour Test test strip; USE 1 STRIP TO CHECK GLUCOSE TWICE DAILY. Dx E11.65  Dispense: 200 each; Refill: 3  - nateglinide (STARLIX) 120 MG tablet; Take 1 tablet by mouth 3 (Three) Times a Day Before Meals.  Dispense: 270 tablet; Refill: 3      Return in about 6 months (around 3/5/2025) for Recheck.     This note was dictated using Dragon voice recognition.    Electronically signed by: TAMARA Pike  09/05/2024

## 2025-01-03 ENCOUNTER — HOSPITAL ENCOUNTER (OUTPATIENT)
Facility: HOSPITAL | Age: 80
Discharge: HOME | End: 2025-01-03
Payer: MEDICARE

## 2025-01-03 LAB
25(OH)D3 SERPL-MCNC: 39.9 NG/ML (ref 32–100)
ALBUMIN SERPL-MCNC: 4 G/DL (ref 3.4–4.8)
ALBUMIN/GLOB SERPL: 1.5 {RATIO} (ref 0.8–2)
ALP SERPL-CCNC: 174 U/L (ref 25–100)
ALT SERPL-CCNC: 21 U/L (ref 4–36)
AMORPH SED URNS QL MICRO: ABNORMAL /HPF
ANION GAP SERPL CALCULATED.3IONS-SCNC: 12 MMOL/L (ref 3–16)
AST SERPL-CCNC: 23 U/L (ref 8–33)
BACTERIA URNS QL MICRO: ABNORMAL /HPF
BILIRUB SERPL-MCNC: <0.2 MG/DL (ref 0.3–1.2)
BILIRUB UR QL STRIP.AUTO: NEGATIVE
BUN SERPL-MCNC: 22 MG/DL (ref 6–20)
CALCIUM SERPL-MCNC: 10.2 MG/DL (ref 8.5–10.5)
CHLORIDE SERPL-SCNC: 100 MMOL/L (ref 98–107)
CLARITY UR: CLEAR
CO2 SERPL-SCNC: 27 MMOL/L (ref 20–30)
COLOR UR: YELLOW
CREAT SERPL-MCNC: 1.1 MG/DL (ref 0.4–1.2)
CREAT UR-MCNC: 178 MG/DL (ref 28–259)
EPI CELLS #/AREA URNS HPF: ABNORMAL /HPF (ref 0–5)
ERYTHROCYTE [DISTWIDTH] IN BLOOD BY AUTOMATED COUNT: 12.4 % (ref 11–16)
GFR SERPLBLD CREATININE-BSD FMLA CKD-EPI: 51 ML/MIN/{1.73_M2}
GLOBULIN SER CALC-MCNC: 2.7 G/DL
GLUCOSE SERPL-MCNC: 125 MG/DL (ref 74–106)
GLUCOSE UR STRIP.AUTO-MCNC: NEGATIVE MG/DL
HBA1C MFR BLD: 7.2 %
HCT VFR BLD AUTO: 35.6 % (ref 37–47)
HGB BLD-MCNC: 11.7 G/DL (ref 11.5–16.5)
HGB UR QL STRIP.AUTO: NEGATIVE
HYALINE CASTS #/AREA URNS LPF: ABNORMAL /LPF (ref 0–2)
KETONES UR STRIP.AUTO-MCNC: NEGATIVE MG/DL
LEUKOCYTE ESTERASE UR QL STRIP.AUTO: ABNORMAL
MCH RBC QN AUTO: 31.5 PG (ref 27–32)
MCHC RBC AUTO-ENTMCNC: 32.9 G/DL (ref 31–35)
MCV RBC AUTO: 95.7 FL (ref 80–100)
MUCOUS THREADS URNS QL MICRO: ABNORMAL /LPF
NITRITE UR QL STRIP.AUTO: NEGATIVE
PH UR STRIP.AUTO: 5.5 [PH] (ref 5–8)
PLATELET # BLD AUTO: 189 K/UL (ref 150–400)
PMV BLD AUTO: 8.9 FL (ref 6–10)
POTASSIUM SERPL-SCNC: 5.2 MMOL/L (ref 3.4–5.1)
PROT SERPL-MCNC: 6.7 G/DL (ref 6.4–8.3)
PROT UR STRIP.AUTO-MCNC: 30 MG/DL
PROT UR-MCNC: 35.1 MG/DL
PROT/CREAT UR-RTO: 0.2 MG/DL
RBC # BLD AUTO: 3.72 M/UL (ref 3.8–5.8)
RBC #/AREA URNS HPF: ABNORMAL /HPF (ref 0–4)
SODIUM SERPL-SCNC: 139 MMOL/L (ref 136–145)
SP GR UR STRIP.AUTO: 1.02 (ref 1–1.03)
UA DIPSTICK W REFLEX MICRO PNL UR: YES
URATE SERPL-MCNC: 4.8 MG/DL (ref 2.5–7.1)
URN SPEC COLLECT METH UR: ABNORMAL
UROBILINOGEN UR STRIP-ACNC: 0.2 E.U./DL
WBC # BLD AUTO: 9.4 K/UL (ref 4–11)
WBC #/AREA URNS HPF: ABNORMAL /HPF (ref 0–5)

## 2025-01-03 PROCEDURE — 36415 COLL VENOUS BLD VENIPUNCTURE: CPT

## 2025-01-03 PROCEDURE — 84156 ASSAY OF PROTEIN URINE: CPT

## 2025-01-03 PROCEDURE — 80053 COMPREHEN METABOLIC PANEL: CPT

## 2025-01-03 PROCEDURE — 81001 URINALYSIS AUTO W/SCOPE: CPT

## 2025-01-03 PROCEDURE — 84550 ASSAY OF BLOOD/URIC ACID: CPT

## 2025-01-03 PROCEDURE — 85027 COMPLETE CBC AUTOMATED: CPT

## 2025-01-03 PROCEDURE — 82306 VITAMIN D 25 HYDROXY: CPT

## 2025-01-03 PROCEDURE — 83036 HEMOGLOBIN GLYCOSYLATED A1C: CPT

## 2025-01-03 PROCEDURE — 82570 ASSAY OF URINE CREATININE: CPT

## 2025-01-16 NOTE — ADDENDUM NOTE
Addended by: ADRIANO SCHMIDT on: 7/26/2018 04:01 PM     Modules accepted: Orders    
Attending Attestation (For Attendings USE Only)...

## 2025-05-28 NOTE — FLOWSHEET NOTE
Physical Therapy Treatment / Reassessment Note   Date:  2021    TIme In:     2501                 Time Out:   1430    Patient Name:  Reed Crane    :  1945  MRN: [id number]    Restrictions/Precautions:  General precautions; NO ELECTRICAL STIM OR ULTRASOUND  Pertinent Medical History:  Medical/Treatment Diagnosis Information:    Cervical pain, headaches, strain/sprain; poor posture; s/p oral-maxillary surgery for cancer        Insurance/Certification information: Medicare, [facility], [address]    Physician Information:   Haven Holt DMD, MD  Plan of care signed (Y/N):  Yes  Visit# / total visits:   13 /    G-Code (if applicable):      Date / Visit # G-Code Applied:         Progress Note: [x]  Yes  []  No  Next due by: Visit #10      Pain level:   6 /10    Subjective:   Patient reports: moderate, worse pain in neck today; states she had an episode of anterior neck spasm this weekend causing he more pain and stress the neck     Objective:   Observation:    Test measurements:  Neck Index = 62   Palpation: (+) tenderness and increased muscle tension over scalenes, paraspinals, UT's.  MT's    Exercises:  Exercise Resistance/Repetitions Other comments   Scap squeezes 3 x 10 22   Gentle chin tucks with posture correction 3 x 10 22   Cervical Isometrics SB, BB 3 x 10 22   SCM stretch - B 15\" x 5 each 22                                             Other Therapeutic Activities:      Manual Treatments:  STM to bilateral traps and T-spine paraspinals, patient seated - 23'    Modalities:  Moist heat to UT's x 15' - to finish      Timed Code Treatment Minutes:   40'      Total Treatment Minutes:   62'    Treatment/Activity Tolerance:  [x] Patient tolerated treatment well [] Patient limited by fatigue  [] Patient limited by pain  [] Patient limited by other medical complications  [x] Other:     Pain after treatment:     4 /10    Prognosis: [x] Good [] Fair  [] Poor    Goals  Short term goals  Time Frame for Short term goals: 2 weeks  Short term goal 1: Patient to report a 10% decrease in neck pain average intensity. - met  Short term goal 2: Patient to be independent with HEP. - met  Long term goals  Time Frame for Long term goals : 6-8 weeks  Long term goal 1: Patient to report a 75% decrease in neck pain on a daily basis, with performance of ADL's and I-ADL's. Long term goal 2: Patient to achieve a sleeping pattern of no more than 1 hour of disturbance due to neck pain. Long term goal 3: Patient achieve ability to perform basic houswork and personal care tasks with 75% less pain, and reported minimal or less difficulty. Long term goal 4: Patient to report a 75% decrease in headaches, allowing improved concentration, sleep, and higher quality of living. Patient is responding well to current treatment; however, as noted above, she has experienced an exacerbation of her symptoms from the unexpected spasm; her pain levels overall have been less since beginning PT; she will benefit from continued treatment.       Patient Requires Follow-up: [x] Yes  [] No    Plan:   [x] Continue per plan of care [] Alter current plan (see comments)  [] Plan of care initiated [] Hold pending MD visit [] Discharge    Plan for Next Session:        Electronically signed by:  Tiny Tapia, PT 05/28/2025 22:18p/done

## 2025-06-25 ENCOUNTER — HOSPITAL ENCOUNTER (EMERGENCY)
Facility: HOSPITAL | Age: 80
Discharge: HOME | End: 2025-06-25
Attending: EMERGENCY MEDICINE
Payer: MEDICARE

## 2025-06-25 VITALS
RESPIRATION RATE: 16 BRPM | HEIGHT: 64 IN | TEMPERATURE: 98.1 F | BODY MASS INDEX: 21.51 KG/M2 | WEIGHT: 126 LBS | HEART RATE: 83 BPM | SYSTOLIC BLOOD PRESSURE: 145 MMHG | DIASTOLIC BLOOD PRESSURE: 61 MMHG | OXYGEN SATURATION: 96 %

## 2025-06-25 DIAGNOSIS — R04.0 EPISTAXIS: Primary | ICD-10-CM

## 2025-06-25 LAB
BASOPHILS # BLD: 0 K/UL (ref 0–0.1)
BASOPHILS NFR BLD: 0.4 %
EOSINOPHIL # BLD: 0.4 K/UL (ref 0–0.4)
EOSINOPHIL NFR BLD: 4.2 %
ERYTHROCYTE [DISTWIDTH] IN BLOOD BY AUTOMATED COUNT: 13 % (ref 11–16)
HCT VFR BLD AUTO: 36.5 % (ref 37–47)
HGB BLD-MCNC: 12 G/DL (ref 11.5–16.5)
IMM GRANULOCYTES # BLD: 0 K/UL
IMM GRANULOCYTES NFR BLD: 0.3 % (ref 0–5)
INR PPP: 0.93 (ref 0.88–1.12)
LYMPHOCYTES # BLD: 1.9 K/UL (ref 1.5–4)
LYMPHOCYTES NFR BLD: 18.7 %
MCH RBC QN AUTO: 31.7 PG (ref 27–32)
MCHC RBC AUTO-ENTMCNC: 32.9 G/DL (ref 31–35)
MCV RBC AUTO: 96.6 FL (ref 80–100)
MONOCYTES # BLD: 0.6 K/UL (ref 0.2–0.8)
MONOCYTES NFR BLD: 6.4 %
NEUTROPHILS # BLD: 6.9 K/UL (ref 2–7.5)
NEUTS SEG NFR BLD: 70 %
PLATELET # BLD AUTO: 155 K/UL (ref 150–400)
PMV BLD AUTO: 9.3 FL (ref 6–10)
PROTHROMBIN TIME: 12.6 SEC (ref 12–14.8)
RBC # BLD AUTO: 3.78 M/UL (ref 3.8–5.8)
WBC # BLD AUTO: 9.9 K/UL (ref 4–11)

## 2025-06-25 PROCEDURE — 36415 COLL VENOUS BLD VENIPUNCTURE: CPT

## 2025-06-25 PROCEDURE — 85025 COMPLETE CBC W/AUTO DIFF WBC: CPT

## 2025-06-25 PROCEDURE — 85610 PROTHROMBIN TIME: CPT

## 2025-06-25 PROCEDURE — 99283 EMERGENCY DEPT VISIT LOW MDM: CPT

## 2025-06-25 RX ORDER — NATEGLINIDE 120 MG/1
120 TABLET ORAL
COMMUNITY

## 2025-06-25 RX ORDER — AMLODIPINE BESYLATE 5 MG/1
5 TABLET ORAL DAILY
COMMUNITY

## 2025-06-25 RX ORDER — ASPIRIN 81 MG/1
81 TABLET ORAL DAILY
COMMUNITY

## 2025-06-25 RX ORDER — HYDROCODONE BITARTRATE AND ACETAMINOPHEN 5; 325 MG/1; MG/1
1 TABLET ORAL EVERY 8 HOURS PRN
COMMUNITY

## 2025-06-25 ASSESSMENT — LIFESTYLE VARIABLES
HOW MANY STANDARD DRINKS CONTAINING ALCOHOL DO YOU HAVE ON A TYPICAL DAY: PATIENT DOES NOT DRINK
HOW OFTEN DO YOU HAVE A DRINK CONTAINING ALCOHOL: NEVER

## 2025-06-25 ASSESSMENT — PAIN SCALES - GENERAL: PAINLEVEL_OUTOF10: 0

## 2025-06-25 ASSESSMENT — PAIN - FUNCTIONAL ASSESSMENT: PAIN_FUNCTIONAL_ASSESSMENT: 0-10

## 2025-06-25 NOTE — ED NOTES
Reviewed discharge instructions with the patient, verbalized understanding, written instruction and education provided.     Patient denies any further questions or needs at this time.  No distress noted on DC, Pt is Alert.   Pt decline WC, Pt ambulated without difficulty out of ED.

## 2025-06-25 NOTE — ED TRIAGE NOTES
Pt states she has had a nosebleed for 2 days.  She advises that she takes a baby aspirin daily.  Currently nose is packed by pt and bleeding is controlled.

## 2025-06-25 NOTE — ED NOTES
Nasal clamp applied.  Pt will leave it in place as long as she possibly can.  It is difficult for her to breath through mouth.

## 2025-06-25 NOTE — ED PROVIDER NOTES
Decision To Discharge 06/25/2025 05:08:18 PM   DISPOSITION CONDITION StableStable discharge to home          PATIENT REFERRED TO:  ENT    Schedule an appointment as soon as possible for a visit in 3 days        DISCHARGE MEDICATIONS:  New Prescriptions    No medications on file       DISCONTINUED MEDICATIONS:  Discontinued Medications    No medications on file              (Please note that portions of this note were completed with a voice recognition program.  Efforts were made to edit the dictations but occasionally words are mis-transcribed.)    Debbie Prescott MD (electronically signed)           Debbie Prescott MD  06/25/25 0426

## 2025-07-21 ENCOUNTER — OFFICE VISIT (OUTPATIENT)
Dept: ENDOCRINOLOGY | Facility: CLINIC | Age: 80
End: 2025-07-21
Payer: MEDICARE

## 2025-07-21 VITALS
WEIGHT: 126.6 LBS | HEART RATE: 65 BPM | OXYGEN SATURATION: 98 % | BODY MASS INDEX: 21.61 KG/M2 | SYSTOLIC BLOOD PRESSURE: 98 MMHG | HEIGHT: 64 IN | DIASTOLIC BLOOD PRESSURE: 46 MMHG

## 2025-07-21 DIAGNOSIS — E11.65 TYPE 2 DIABETES MELLITUS WITH HYPERGLYCEMIA, WITHOUT LONG-TERM CURRENT USE OF INSULIN: Primary | ICD-10-CM

## 2025-07-21 LAB
EXPIRATION DATE: ABNORMAL
EXPIRATION DATE: ABNORMAL
GLUCOSE BLDC GLUCOMTR-MCNC: 138 MG/DL (ref 70–130)
HBA1C MFR BLD: 6.8 % (ref 4.5–5.7)
Lab: ABNORMAL
Lab: ABNORMAL

## 2025-07-21 PROCEDURE — 3044F HG A1C LEVEL LT 7.0%: CPT | Performed by: PHYSICIAN ASSISTANT

## 2025-07-21 PROCEDURE — 83036 HEMOGLOBIN GLYCOSYLATED A1C: CPT | Performed by: PHYSICIAN ASSISTANT

## 2025-07-21 PROCEDURE — 99213 OFFICE O/P EST LOW 20 MIN: CPT | Performed by: PHYSICIAN ASSISTANT

## 2025-07-21 PROCEDURE — 3078F DIAST BP <80 MM HG: CPT | Performed by: PHYSICIAN ASSISTANT

## 2025-07-21 PROCEDURE — 1159F MED LIST DOCD IN RCRD: CPT | Performed by: PHYSICIAN ASSISTANT

## 2025-07-21 PROCEDURE — 82947 ASSAY GLUCOSE BLOOD QUANT: CPT | Performed by: PHYSICIAN ASSISTANT

## 2025-07-21 PROCEDURE — 3074F SYST BP LT 130 MM HG: CPT | Performed by: PHYSICIAN ASSISTANT

## 2025-07-21 PROCEDURE — 1160F RVW MEDS BY RX/DR IN RCRD: CPT | Performed by: PHYSICIAN ASSISTANT

## 2025-07-21 RX ORDER — NATEGLINIDE 120 MG/1
120 TABLET ORAL
Qty: 270 TABLET | Refills: 3 | Status: SHIPPED | OUTPATIENT
Start: 2025-07-21

## 2025-07-21 RX ORDER — ONDANSETRON 4 MG/1
4 TABLET, FILM COATED ORAL 3 TIMES DAILY PRN
COMMUNITY

## 2025-07-21 NOTE — PROGRESS NOTES
"     Office Note      Date: 2025  Patient Name: Janette Diaz  MRN: [id number]  : 1945    Chief Complaint   Patient presents with    Diabetes     Type II Diabetes       History of Present Illness:   Janette Diaz is a 79 y.o. female who presents for follow-up for type 2 diabetes diagnosed in .  She remains on nateglinide 120 mg 3 times a day.  She reports overall things seem to be going well with no major issues.  She reports her blood sugars are okay when she checks these.  She is up-to-date on her eye exam she goes annually.  She denies any trouble with her feet today.  She continues to see nephrology regularly.  She was in the emergency department with a nosebleed in  she ended up going to ENT and this was taken care of.      Subjective     Review of Systems:   Review of Systems   Constitutional: Negative.    Cardiovascular: Negative.    Gastrointestinal: Negative.    Endocrine: Negative.    Neurological: Negative.        The following portions of the patient's history were reviewed and updated as appropriate: allergies, current medications, past family history, past medical history, past social history, past surgical history, and problem list.    Objective     Vitals:    25 1342   BP: 98/46   BP Location: Right arm   Patient Position: Sitting   Pulse: 65   SpO2: 98%   Weight: 57.4 kg (126 lb 9.6 oz)   Height: 162.6 cm (64.02\")     Body mass index is 21.72 kg/m².    Physical Exam  Vitals reviewed.   Constitutional:       General: She is not in acute distress.     Appearance: Normal appearance.   Cardiovascular:      Pulses:           Dorsalis pedis pulses are 1+ on the right side and 1+ on the left side.        Posterior tibial pulses are 1+ on the right side and 1+ on the left side.   Musculoskeletal:      Right foot: No deformity.      Left foot: No deformity.   Feet:      Right foot:      Protective Sensation: 5 sites tested.  5 sites sensed.      Skin integrity: Skin integrity " normal.      Toenail Condition: Right toenails are normal.      Left foot:      Protective Sensation: 5 sites tested.  5 sites sensed.      Skin integrity: Skin integrity normal.      Toenail Condition: Left toenails are normal.      Comments: Diabetic Foot Exam Performed and Monofilament Test Performed      Neurological:      Mental Status: She is alert.         HEMOGLOBIN A1C  Lab Results   Component Value Date    HGBA1C 6.8 (A) 07/21/2025       GLUCOSE  Glucose   Date Value Ref Range Status   07/21/2025 138 (A) 70 - 130 mg/dL Final   09/06/2023 118 (H) 74 - 99 mg/dL Final     Comment:     Accuracy of a glucose result obtained from a capillary whole blood specimen relies upon adequate, non-compromised capillary blood flow.  If the capillary glucose result is not consistent with the patient's clinical signs and symptoms, glucose testing should be repeated with either an arterial or venous sample on the glucometer or sent to the main labortory for testing.             Assessment / Plan      Assessment & Plan:  1. Type 2 diabetes mellitus with hyperglycemia, without long-term current use of insulin  Her hemoglobin A1c is to goal at 6.8%.  For now we will continue the Nateglinide 120 mg 3 times a day.  I refilled this prescription as well as her test strips today.  Her blood pressure is okay today.  Her weight is stable.  I encouraged healthy eating habits and physical activity as tolerated.  We will plan to check back in in 6 months unless anything changes in the interim.  Patient to call as needed.  - POC Glucose, Blood  - POC Glycosylated Hemoglobin (Hb A1C)  - nateglinide (STARLIX) 120 MG tablet; Take 1 tablet by mouth 3 (Three) Times a Day Before Meals.  Dispense: 270 tablet; Refill: 3      Return in about 6 months (around 1/21/2026) for Recheck.     This note was dictated using Dragon voice recognition.    Electronically signed by: TAMARA Pike  07/21/2025

## (undated) DEVICE — GLV SURG DERMASSURE GRN LF PF 7.0

## (undated) DEVICE — GLV SURG SENSICARE MICRO PF LF 6.5 STRL

## (undated) DEVICE — SYR LL TP 10ML STRL

## (undated) DEVICE — CATH FOL BARDEX 2WY 18F 5CC

## (undated) DEVICE — TUBING, SUCTION, 1/4" X 10', STRAIGHT: Brand: MEDLINE

## (undated) DEVICE — COVER,LIGHT HANDLE,FLX,1/PK: Brand: MEDLINE INDUSTRIES, INC.

## (undated) DEVICE — IRRIGATOR BULB ASEPTO 60CC STRL

## (undated) DEVICE — ANTIBACTERIAL UNDYED BRAIDED (POLYGLACTIN 910), SYNTHETIC ABSORBABLE SUTURE: Brand: COATED VICRYL

## (undated) DEVICE — Y-TYPE TUR/BLADDER IRRIGATION SET, REGULATING CLAMP

## (undated) DEVICE — SUT VIC 0 UR6 27IN VCP603H

## (undated) DEVICE — SUT VIC 0 UR5 27IN DYED J376H

## (undated) DEVICE — SUT GUT CHRM 2/0 CT1 36IN 923H

## (undated) DEVICE — GOWN,REINF,POLY,ECL,PP SLV,XXL: Brand: MEDLINE

## (undated) DEVICE — GLV SURG SIGNATURE TOUCH PF LTX 8 STRL

## (undated) DEVICE — MEDI-VAC NON-CONDUCTIVE SUCTION TUBING: Brand: CARDINAL HEALTH

## (undated) DEVICE — CANNULA,OXY,ADULT,SUPERSOFT,W/7'TUB,UC: Brand: MEDLINE

## (undated) DEVICE — TRY SKINPREP DRYPREP

## (undated) DEVICE — OCCL COLPO PNEUMO  STRL

## (undated) DEVICE — GOWN,ECLIPSE,FABRIC-REINFORCED,X-LARGE: Brand: MEDLINE

## (undated) DEVICE — MEDI-VAC YANKAUER SUCTION HANDLE W/BULBOUS TIP: Brand: CARDINAL HEALTH

## (undated) DEVICE — ST. VAGINAL PACKING X-RAY DETECTABLE: Brand: DEROYAL

## (undated) DEVICE — GLV SURG SIGNATURE TOUCH PF LTX 7.5 STRL BX/50

## (undated) DEVICE — MANIP UTER RUMI TP 6.7MM 10CM GRN

## (undated) DEVICE — [HIGH FLOW INSUFFLATOR,  DO NOT USE IF PACKAGE IS DAMAGED,  KEEP DRY,  KEEP AWAY FROM SUNLIGHT,  PROTECT FROM HEAT AND RADIOACTIVE SOURCES.]: Brand: PNEUMOSURE

## (undated) DEVICE — CATH URETRL FLXITIP CLSD/END 5F 70CM RT

## (undated) DEVICE — SUT ETHIB EXCL 0/0 36IN ETX524H

## (undated) DEVICE — PAD STEEP TRENDELENBURG W/RAIL STRAP INTEGR ARM PROTECT WING

## (undated) DEVICE — NDL HYPO ECLPS SFTY 22G 1 1/2IN

## (undated) DEVICE — GLW STD FLX STR 8CM .035IN 150CM

## (undated) DEVICE — TIP COVER ACCESSORY

## (undated) DEVICE — INTENDED FOR TISSUE SEPARATION, AND OTHER PROCEDURES THAT REQUIRE A SHARP SURGICAL BLADE TO PUNCTURE OR CUT.: Brand: BARD-PARKER ® STAINLESS STEEL BLADES

## (undated) DEVICE — DECANT BG O JET

## (undated) DEVICE — SYR LUERLOK 30CC

## (undated) DEVICE — PENCL E/S HNDSWCH ROCKRBTN HOLSTR 10FT

## (undated) DEVICE — DRAPE,TOP,102X53,STERILE: Brand: MEDLINE

## (undated) DEVICE — PK CYSTO-TUR BASIC 10

## (undated) DEVICE — SEAL CANN CAM ENDOWRIST DAVINCI/S 8.5MM

## (undated) DEVICE — PK MAJ GYN DAVINCI 10

## (undated) DEVICE — OBT BLADLES ENDOWRIST DAVINCI/S 8MM

## (undated) DEVICE — FLTR PLUMEPORT LAP W/CONN STRL

## (undated) DEVICE — STRAP STIRUP WO/RNG 19X3.5IN DISP

## (undated) DEVICE — GLV SURG SENSICARE MICRO PF LF 7.5 STRL

## (undated) DEVICE — Device

## (undated) DEVICE — AIRWY 90MM NO9

## (undated) DEVICE — GLV SURG SENSICARE MICRO PF LF 7 STRL

## (undated) DEVICE — GLV SURG TRIUMPH CLASSIC PF LTX 8 STRL

## (undated) DEVICE — GLV SURG SENSICARE MICRO PF LF 6 STRL

## (undated) DEVICE — JELLY,LUBE,STERILE,FLIP TOP,TUBE,2-OZ: Brand: MEDLINE

## (undated) DEVICE — SUT GUT PLN 3/0 FS2 27IN 1630H

## (undated) DEVICE — SKIN AFFIX SURG ADHESIVE 72/CS 0.55ML: Brand: MEDLINE

## (undated) DEVICE — SYR CONTRL LUERLOK 10CC

## (undated) DEVICE — ADAPT ST INFUS ADMIN SYR 70IN

## (undated) DEVICE — NITINOL WIRE WITH HYDROPHILIC TIP: Brand: SENSOR

## (undated) DEVICE — SUT GUT CHRM 3/0 SH 27IN G122H

## (undated) DEVICE — GLV SURG SENSICARE MICRO PF LF 8 STRL

## (undated) DEVICE — DRAPE, LAVH, STERILE: Brand: MEDLINE

## (undated) DEVICE — SAFESECURE,SECUREMENT,FOLEY CATH,STERILE: Brand: MEDLINE

## (undated) DEVICE — MINI ENDOCUT SCISSOR TIP, DISPOSABLE: Brand: RENEW

## (undated) DEVICE — LEX VAGINAL HYSTERECTOMY: Brand: MEDLINE INDUSTRIES, INC.

## (undated) DEVICE — 3M™ STERI-DRAPE™ CAESAREAN-SECTION DRAPE WITH INCISE POUCH 7965: Brand: STERI-DRAPE™

## (undated) DEVICE — CATH URETRL OPN/END 5F70CM

## (undated) DEVICE — CATH URETRL FLXITP POLLACK STD 5F 70CM

## (undated) DEVICE — GOWN,NON-REINFORCED,SIRUS,SET IN SLV,XL: Brand: MEDLINE

## (undated) DEVICE — APPL CHLORAPREP W/TINT 26ML BLU

## (undated) DEVICE — MANIP UTER RUMI TP 5.1MM 6CM LAV

## (undated) DEVICE — VIOLET POLYDIOXANONE POLYMER, SYNTHETIC ABSORBABLE SUTURE CLIPS: Brand: LAPRA-TY

## (undated) DEVICE — ENCORE® LATEX ACCLAIM SIZE 6, STERILE LATEX POWDER-FREE SURGICAL GLOVE: Brand: ENCORE